# Patient Record
Sex: MALE | Race: WHITE | NOT HISPANIC OR LATINO | Employment: OTHER | URBAN - METROPOLITAN AREA
[De-identification: names, ages, dates, MRNs, and addresses within clinical notes are randomized per-mention and may not be internally consistent; named-entity substitution may affect disease eponyms.]

---

## 2019-02-05 ENCOUNTER — TRANSFERRED RECORDS (OUTPATIENT)
Dept: HEALTH INFORMATION MANAGEMENT | Facility: CLINIC | Age: 84
End: 2019-02-05

## 2019-08-20 ENCOUNTER — HOSPITAL ENCOUNTER (EMERGENCY)
Facility: CLINIC | Age: 84
Discharge: HOME | End: 2019-08-20
Attending: PHYSICIAN ASSISTANT | Admitting: PHYSICIAN ASSISTANT
Payer: COMMERCIAL

## 2019-08-20 VITALS
SYSTOLIC BLOOD PRESSURE: 113 MMHG | HEIGHT: 72 IN | WEIGHT: 185 LBS | BODY MASS INDEX: 25.06 KG/M2 | OXYGEN SATURATION: 96 % | HEART RATE: 98 BPM | DIASTOLIC BLOOD PRESSURE: 76 MMHG | TEMPERATURE: 97.7 F | RESPIRATION RATE: 16 BRPM

## 2019-08-20 DIAGNOSIS — L03.211 FACIAL CELLULITIS: ICD-10-CM

## 2019-08-20 PROCEDURE — 99282 EMERGENCY DEPT VISIT SF MDM: CPT

## 2019-08-20 RX ORDER — CLINDAMYCIN HCL 300 MG
300 CAPSULE ORAL 4 TIMES DAILY
Qty: 40 CAPSULE | Refills: 0 | Status: SHIPPED | OUTPATIENT
Start: 2019-08-20 | End: 2019-10-26

## 2019-08-20 ASSESSMENT — ENCOUNTER SYMPTOMS
WOUND: 1
NAUSEA: 0
VOMITING: 0
EYE PAIN: 0
FEVER: 0

## 2019-08-20 ASSESSMENT — MIFFLIN-ST. JEOR: SCORE: 1572.15

## 2019-08-20 NOTE — ED AVS SNAPSHOT
[facility] Emergency Department  [address]  [facility] [address]  Phone:  [phone]  Fax:  [phone]                                    William Ba   MRN: [id number]    Department:  [facility] Emergency Department   Date of Visit:  8/20/2019           After Visit Summary Signature Page    I have received my discharge instructions, and my questions have been answered. I have discussed any challenges I see with this plan with the nurse or doctor.    ..........................................................................................................................................  Patient/Patient Representative Signature      ..........................................................................................................................................  Patient Representative Print Name and Relationship to Patient    ..................................................               ................................................  Date                                   Time    ..........................................................................................................................................  Reviewed by Signature/Title    ...................................................              ..............................................  Date                                               Time          22EPIC Rev 08/18

## 2019-08-20 NOTE — ED TRIAGE NOTES
Insect bites to face, near eyes last week.  Daughter is concerned that patient has MRSA.  ABCDs intact.

## 2019-08-20 NOTE — ED NOTES
MD in to see patient and discuss diagnosis, test results, and discharge plan. Discussed cellulitis and how to take antibiotics as prescribed. Patient meets discharge criteria. Discussed AVS with patient. Questions answered. Patient verbalized understanding. Patient reports being ready to go home. Patient discharged home by car with family with all necessary information.

## 2019-08-20 NOTE — ED PROVIDER NOTES
History     Chief Complaint:  insect bite to face    HPI   William Ba is a 84 year old male with a history of atrial fibrillation on Xarelto who presents with his daughter for the evaluation of insect bite to face. The patient reports that he was bitten on his forehead by multiple times by a bug one week ago and that he woke up this morning with one of the bites more darkened, red, and swollen, prompting him to the ED. The patient notes that the bite site is painful and that most of his other bites are healing except this one. The patient denies fever, vision changes, eye pain, nausea, vomiting, and other issues.      Allergies:  No Known Drug Allergies     Medications:    Metoprolol  Xarelto     Past Medical History:    COPD  Atrial fibrillation    Past Surgical History:    History reviewed. No pertinent surgical history.    Family History:    History reviewed. No pertinent family history.     Social History:  The patient was accompanied to the ED by daughter and wife.  Smoking Status: Not on file  Smokeless Tobacco: No  Alcohol Use: Yes  Marital Status:   [2]     Review of Systems   Constitutional: Negative for fever.   Eyes: Negative for pain and visual disturbance.   Gastrointestinal: Negative for nausea and vomiting.   Skin: Positive for wound.   All other systems reviewed and are negative.       Physical Exam     Patient Vitals for the past 24 hrs:   BP Temp Temp src Heart Rate Resp SpO2 Height Weight   08/20/19 1010 110/70 97.7  F (36.5  C) Temporal 76 16 97 % 1.829 m (6') 83.9 kg (185 lb)        Physical Exam  Constitutional: well appearing elderly male in no acute distress.   Head: No external signs of trauma noted to head or face.   Eyes: Pupils are equal, round, and reactive to light. Conjunctiva normal. EOMI. Visual acuity groslsly intact.    ENT: MMM.  Normal voice.   Neck: normal ROM.  Cardiovascular: Normal rate, regular rhythm, and intact distal pulses.    Respiratory: Effort normal. No  respiratory distress. Lungs clear to auscultation bilaterally.   GI: Soft. Non-tender.   Musculoskeletal: No deformities appreciated. Normal ROM. No edema noted.  Neurological: Alert and Oriented x 3. Speech normal. Moves all extremities equally.    Psychiatric: Appropriate mood, affect, and behavior.   Skin: Skin is warm and dry. 1 cm scabbed lesion over left eyebrow with mild surrounding swelling and erythema. No induration or fluctuance. No other rash or skin lesions appreciated. No blisters or vesicular lesions noted.       Emergency Department Course   Emergency Department Course:  Past medical records, nursing notes, and vitals reviewed.  1032: I performed an exam of the patient and obtained history, as documented above.     I rechecked the patient.  Findings and plan explained to the Patient. Patient discharged home with instructions regarding supportive care, medications, and reasons to return. The importance of close follow-up was reviewed.        Impression & Plan    Medical Decision Makin year old male presenting for wound check. He was reportedly has bug bites to his face about one week ago. Most of them have completely healed, but there is one above the left eyebrow that is now worse, scabbed over, and now red around it. This is concerning for an early cellulitis. There is no evidence of abscess on exam. He denies significant pain. The area does not seem consistent with zoster as it is not vesicular in nature and there is only one lesion. Additionally, I would suspect that if this was zoster he would be having more pain. He is afebrile and otherwise well appearing. I will plan to treat him with antibiotics for cellulitis and have him follow-up closely in clinic if not improving. Instructed to return to the ED for any new or worsening symptoms, including worsening pain, fever, spreading redness, developing new lesions, or other concerning symptoms.     Diagnosis:    ICD-10-CM    1. Facial  cellulitis L03.211        Disposition:  discharged to home    Discharge Medications:  New Prescriptions    CLINDAMYCIN (CLEOCIN) 300 MG CAPSULE    Take 1 capsule (300 mg) by mouth 4 times daily for 10 days     Scribe Disclosure:  I, Adryan Camilo, am serving as a scribe at 10:32 AM on 8/20/2019 to document services personally performed by Navya Ken PA-C based on my observations and the provider's statements to me.      Adryan Camilo  8/20/2019   [facility] EMERGENCY DEPARTMENT       Navya Ken PA-C  08/20/19 1554

## 2019-09-18 ENCOUNTER — TRANSFERRED RECORDS (OUTPATIENT)
Dept: HEALTH INFORMATION MANAGEMENT | Facility: CLINIC | Age: 84
End: 2019-09-18

## 2019-10-03 ENCOUNTER — TRANSFERRED RECORDS (OUTPATIENT)
Dept: HEALTH INFORMATION MANAGEMENT | Facility: CLINIC | Age: 84
End: 2019-10-03

## 2019-10-26 ENCOUNTER — HOSPITAL ENCOUNTER (EMERGENCY)
Facility: CLINIC | Age: 84
Discharge: HOME | End: 2019-10-26
Attending: EMERGENCY MEDICINE | Admitting: EMERGENCY MEDICINE
Payer: MEDICARE

## 2019-10-26 ENCOUNTER — APPOINTMENT (OUTPATIENT)
Dept: GENERAL RADIOLOGY | Facility: CLINIC | Age: 84
End: 2019-10-26
Attending: EMERGENCY MEDICINE
Payer: MEDICARE

## 2019-10-26 VITALS
SYSTOLIC BLOOD PRESSURE: 114 MMHG | DIASTOLIC BLOOD PRESSURE: 81 MMHG | OXYGEN SATURATION: 99 % | TEMPERATURE: 97.9 F | RESPIRATION RATE: 18 BRPM | HEART RATE: 68 BPM

## 2019-10-26 DIAGNOSIS — R07.9 CHEST PAIN, UNSPECIFIED TYPE: ICD-10-CM

## 2019-10-26 DIAGNOSIS — I48.92 ATRIAL FLUTTER, UNSPECIFIED TYPE (H): ICD-10-CM

## 2019-10-26 DIAGNOSIS — R91.8 PULMONARY NODULES: ICD-10-CM

## 2019-10-26 LAB
ANION GAP SERPL CALCULATED.3IONS-SCNC: 2 MMOL/L (ref 3–14)
BASOPHILS # BLD AUTO: 0 10E9/L (ref 0–0.2)
BASOPHILS NFR BLD AUTO: 0.1 %
BUN SERPL-MCNC: 19 MG/DL (ref 7–30)
CALCIUM SERPL-MCNC: 8.6 MG/DL (ref 8.5–10.1)
CHLORIDE SERPL-SCNC: 109 MMOL/L (ref 94–109)
CO2 SERPL-SCNC: 28 MMOL/L (ref 20–32)
CREAT SERPL-MCNC: 0.98 MG/DL (ref 0.66–1.25)
DIFFERENTIAL METHOD BLD: ABNORMAL
EOSINOPHIL # BLD AUTO: 0.2 10E9/L (ref 0–0.7)
EOSINOPHIL NFR BLD AUTO: 1.8 %
ERYTHROCYTE [DISTWIDTH] IN BLOOD BY AUTOMATED COUNT: 13.2 % (ref 10–15)
GFR SERPL CREATININE-BSD FRML MDRD: 71 ML/MIN/{1.73_M2}
GLUCOSE SERPL-MCNC: 99 MG/DL (ref 70–99)
HCT VFR BLD AUTO: 43.2 % (ref 40–53)
HGB BLD-MCNC: 13.7 G/DL (ref 13.3–17.7)
IMM GRANULOCYTES # BLD: 0 10E9/L (ref 0–0.4)
IMM GRANULOCYTES NFR BLD: 0.5 %
LYMPHOCYTES # BLD AUTO: 1.7 10E9/L (ref 0.8–5.3)
LYMPHOCYTES NFR BLD AUTO: 20.4 %
MCH RBC QN AUTO: 31.5 PG (ref 26.5–33)
MCHC RBC AUTO-ENTMCNC: 31.7 G/DL (ref 31.5–36.5)
MCV RBC AUTO: 99 FL (ref 78–100)
MONOCYTES # BLD AUTO: 0.7 10E9/L (ref 0–1.3)
MONOCYTES NFR BLD AUTO: 8.5 %
NEUTROPHILS # BLD AUTO: 5.6 10E9/L (ref 1.6–8.3)
NEUTROPHILS NFR BLD AUTO: 68.7 %
NRBC # BLD AUTO: 0 10*3/UL
NRBC BLD AUTO-RTO: 0 /100
NT-PROBNP SERPL-MCNC: 1199 PG/ML (ref 0–1800)
PLATELET # BLD AUTO: 145 10E9/L (ref 150–450)
POTASSIUM SERPL-SCNC: 4.1 MMOL/L (ref 3.4–5.3)
RBC # BLD AUTO: 4.35 10E12/L (ref 4.4–5.9)
SODIUM SERPL-SCNC: 139 MMOL/L (ref 133–144)
TROPONIN I SERPL-MCNC: <0.015 UG/L (ref 0–0.04)
TROPONIN I SERPL-MCNC: <0.015 UG/L (ref 0–0.04)
WBC # BLD AUTO: 8.2 10E9/L (ref 4–11)

## 2019-10-26 PROCEDURE — 80048 BASIC METABOLIC PNL TOTAL CA: CPT | Performed by: EMERGENCY MEDICINE

## 2019-10-26 PROCEDURE — 99285 EMERGENCY DEPT VISIT HI MDM: CPT | Mod: 25

## 2019-10-26 PROCEDURE — 93005 ELECTROCARDIOGRAM TRACING: CPT

## 2019-10-26 PROCEDURE — 84484 ASSAY OF TROPONIN QUANT: CPT | Performed by: EMERGENCY MEDICINE

## 2019-10-26 PROCEDURE — 71046 X-RAY EXAM CHEST 2 VIEWS: CPT

## 2019-10-26 PROCEDURE — 83880 ASSAY OF NATRIURETIC PEPTIDE: CPT | Performed by: EMERGENCY MEDICINE

## 2019-10-26 PROCEDURE — 85025 COMPLETE CBC W/AUTO DIFF WBC: CPT | Performed by: EMERGENCY MEDICINE

## 2019-10-26 RX ORDER — LIDOCAINE 40 MG/G
CREAM TOPICAL
Status: DISCONTINUED | OUTPATIENT
Start: 2019-10-26 | End: 2019-10-26 | Stop reason: HOSPADM

## 2019-10-26 ASSESSMENT — ENCOUNTER SYMPTOMS
FEVER: 0
COUGH: 1
SHORTNESS OF BREATH: 0
ABDOMINAL PAIN: 0

## 2019-10-26 NOTE — ED PROVIDER NOTES
Here for CP.  In atrial flutter, hx of, on xarelto.  Delta trop and discharge.    Repeat trop negative. Pt pain free. DC    Yayo Glynn MD  [employer]  6:26 AM 10/26/19        Yayo Glynn MD  10/27/19 0530

## 2019-10-26 NOTE — ED PROVIDER NOTES
History     Chief Complaint:  Chest Pain    HPI   William Ba is a 84 year old male, with a history of COPD and atrial fibrillation, who is currently on Xarelto, who presents to the ED via EMS for evaluation of chest pain. The patient reports a cough for the last 10 days with intermittent episodes of dull chest pain. Tonight, the patient states he started experiencing an episode of chest pain brought on by laying flat around midnight. The patient currently lives in an assisted living home, where he has been getting increased nebulization treatments to help with his cough secondary to his history of COPD. The patient was given 4 x 81 mg Aspirin en route by EMS. The patient denies any fevers, shortness of breath, or abdominal pain.     Allergies:  The patient has no known drug allergies.    Medications:    Metoprolol  Xarelto    Past Medical History:    COPD  Atrial fibrillation/flutter    Past Surgical History:    The patient does not have any pertinent past surgical history.    Family History:    No past pertinent family history.    Social History:  Negative for tobacco use.  Positive for alcohol use.   Negative for drug use.  Marital Status:       Review of Systems   Constitutional: Negative for fever.   Respiratory: Positive for cough. Negative for shortness of breath.    Cardiovascular: Positive for chest pain.   Gastrointestinal: Negative for abdominal pain.   All other systems reviewed and are negative.      Physical Exam     Patient Vitals for the past 24 hrs:   BP Temp Temp src Pulse Heart Rate Resp SpO2   10/26/19 0523 128/82 -- -- 68 67 -- 97 %   10/26/19 0500 132/85 -- -- 69 67 -- 99 %   10/26/19 0450 (!) 141/89 97.9  F (36.6  C) Oral 70 -- 18 100 %     Physical Exam  Nursing note and vitals reviewed.  Constitutional: Cooperative.   HENT:   Mouth/Throat: Mucous membranes are normal.   Cardiovascular: Normal rate, regular rhythm and normal heart sounds.  No murmur.  Pulmonary/Chest: Effort  normal and breath sounds normal. No respiratory distress. No wheezes. No rales.   Abdominal: Soft. Normal appearance and bowel sounds are normal. No distension. There is no tenderness. There is no rigidity and no guarding.   Musculoskeletal: No LE edema   Neurological: Alert. Oriented x3  Skin: Skin is warm and dry.   Psychiatric: Normal mood and affect.     Emergency Department Course   ECG:  Indication: Chest Pain  Time: 0456  Vent. Rate 68 bpm. SD interval *. QRS duration 96. QT/QTc 400/425. P-R-T axis 80 13 6.  Atrial flutter with variable AV block. Read time: 0500    Imaging:  Radiographic findings were communicated with the patient who voiced understanding of the findings.    XR Chest 2 views:   Heart is normal in size. Aorta is tortuous. No pneumothorax. No infiltrates. There is a 4 mm nodule left apex. No pleural fluid. Thoracic osteophytes as per radiology.    Laboratory:  CBC: WBC: 8.2, HGB: 13.7, PLT: 145 (L)  BMP: Anion Gap: 2 (L), o/w WNL (Creatinine: 0.98)    0450 Troponin: <0.015  Nt probnp inpatient: 1,199    0700   Troponin: pending    Emergency Department Course:  Nursing notes and vitals reviewed. (0449) I performed an exam of the patient as documented above.     0456 ECG was obtained.     IV inserted. Medicine administered as documented above. Blood drawn. This was sent to the lab for further testing, results above.     The patient was sent for a chest x-ray while in the emergency department, findings above.     0606 I rechecked the patient and discussed the results of his workup thus far.    The patient was signed out to the AM ED physician.     Impression & Plan    Medical Decision Making:  William Ba is a 84 year old male who presents with chest pain as described in the HPI. The chest pain has abated at this time. He received aspirin pre-hospital. Initial workup is reassuring showing a-flutter on the EKG. Chest x-ray is clear other than small nodule.  Recommended follow up for this with  family. He is already anticoagulated to pulmonary embolism is very unlikely. No evidence of a pneumothorax or pneumonia. Initial troponin was negative, but we'll need a second troponin with a delta of two hours with a second troponin at 0700 for formal rule out. This will be signed out to my partner for follow up. Assuming this is negative the patient will be stable to discharge to home with primary care follow up. If he rules in, then he will need to be admitted for anticoagulation and further cardiology workup.     Diagnosis:    ICD-10-CM   1. Chest pain, unspecified type R07.9   2. Pulmonary nodule - noted on x-ray R91.8       Disposition:  The patient was signed out to the AM ED physician.    Scribe Disclosure:  I, Tara Alvarez, am serving as a scribe on 10/26/2019 at 4:49 AM to personally document services performed by Yayo Waters MD based on my observations and the provider's statements to me.     Tara Alvarez  10/26/2019   [facility] EMERGENCY DEPARTMENT       Yayo Waters MD  10/26/19 0621

## 2019-10-26 NOTE — ED AVS SNAPSHOT
[facility] Emergency Department  [address]  [facility] [address]  Phone:  [phone]  Fax:  [phone]                                    William Ba   MRN: [id number]    Department:  [facility] Emergency Department   Date of Visit:  10/26/2019           After Visit Summary Signature Page    I have received my discharge instructions, and my questions have been answered. I have discussed any challenges I see with this plan with the nurse or doctor.    ..........................................................................................................................................  Patient/Patient Representative Signature      ..........................................................................................................................................  Patient Representative Print Name and Relationship to Patient    ..................................................               ................................................  Date                                   Time    ..........................................................................................................................................  Reviewed by Signature/Title    ...................................................              ..............................................  Date                                               Time          22EPIC Rev 08/18

## 2019-10-26 NOTE — DISCHARGE INSTRUCTIONS
Discharge Instructions  Chest Pain    You have been seen today for chest pain or discomfort.  At this time, your provider has found no signs that your chest pain is due to a serious or life-threatening condition, (or you have declined more testing and/or admission to the hospital). However, sometimes there is a serious problem that does not show up right away. Your evaluation today may not be complete and you may need further testing and evaluation.     Generally, every Emergency Department visit should have a follow-up clinic visit with either a primary or a specialty clinic/provider. Please follow-up as instructed by your emergency provider today.  Return to the Emergency Department if:  Your chest pain changes, gets worse, starts to happen more often, or comes with less activity.  You are newly short of breath.  You get very weak or tired.  You pass out or faint.  You have any new symptoms, like fever, cough, numb legs, or you cough up blood.  You have anything else that worries you.    Until you follow-up with your regular provider, please do the following:  Take one aspirin daily unless you have an allergy or are told not to by your provider.  If a stress test appointment has been made, go to the appointment.  If you have questions, contact your regular provider.  Follow-up with your regular provider/clinic as directed; this is very important.    If you were given a prescription for medicine here today, be sure to read all of the information (including the package insert) that comes with your prescription.  This will include important information about the medicine, its side effects, and any warnings that you need to know about.  The pharmacist who fills the prescription can provide more information and answer questions you may have about the medicine.  If you have questions or concerns that the pharmacist cannot address, please call or return to the Emergency Department.       Remember that you can always come  back to the Emergency Department if you are not able to see your regular provider in the amount of time listed above, if you get any new symptoms, or if there is anything that worries you.  ~~~~~~~~~~~~~~~~~    Discharge Instructions  Incidental Findings    An incidental finding is something unexpected that was found while you were being treated and is felt to not be related to the reason that you came to the Emergency Department.  While this finding is not an emergency, you need to follow up with your primary provider (or occasionally a specialist) to determine if anything should be done about it.    These findings can come from:  Checking your vital signs (example: high blood pressure).  Taking your history (example: unexplained weight loss).  The physical exam (example: a heart murmur).  Laboratory study (example: anemia or low blood count).  X-rays/ultrasound/CT or other imaging (example: an unexplained mass).    Generally, every Emergency Department visit should have a follow-up clinic visit with either a primary or a specialty clinic/provider. Please follow-up as instructed by your emergency provider today.    Return to the Emergency Department if:  Your condition worsens.  You develop unexpected pain.  You now develop new symptoms or have new concerns.  If you were given a prescription for medicine here today, be sure to read all of the information (including the package insert) that comes with your prescription.  This will include important information about the medicine, its side effects, and any warnings that you need to know about.  The pharmacist who fills the prescription can provide more information and answer questions you may have about the medicine.  If you have questions or concerns that the pharmacist cannot address, please call or return to the Emergency Department.   Remember that you can always come back to the Emergency Department if you are not able to see your regular provider in the amount  of time listed above, if you get any new symptoms, or if there is anything that worries you.

## 2019-10-26 NOTE — ED NOTES
Bed: ED09  Expected date: 10/26/19  Expected time: 4:39 AM  Means of arrival: Ambulance  Comments:  907 84 M pneumonia

## 2019-10-28 LAB — INTERPRETATION ECG - MUSE: NORMAL

## 2019-11-08 ENCOUNTER — PRE VISIT (OUTPATIENT)
Dept: CARDIOLOGY | Facility: CLINIC | Age: 84
End: 2019-11-08

## 2019-11-13 PROBLEM — I48.92 ATRIAL FIBRILLATION/FLUTTER (H): Status: ACTIVE | Noted: 2019-11-13

## 2019-11-18 PROBLEM — I63.9 ACUTE CVA (CEREBROVASCULAR ACCIDENT) (H): Status: ACTIVE | Noted: 2019-03-28

## 2019-11-19 ENCOUNTER — OFFICE VISIT (OUTPATIENT)
Dept: CARDIOLOGY | Facility: CLINIC | Age: 84
End: 2019-11-19
Payer: MEDICARE

## 2019-11-19 VITALS
BODY MASS INDEX: 24.52 KG/M2 | SYSTOLIC BLOOD PRESSURE: 85 MMHG | DIASTOLIC BLOOD PRESSURE: 57 MMHG | HEIGHT: 73 IN | HEART RATE: 88 BPM | WEIGHT: 185 LBS

## 2019-11-19 DIAGNOSIS — I25.10 ATHEROSCLEROSIS OF NATIVE CORONARY ARTERY OF NATIVE HEART WITHOUT ANGINA PECTORIS: ICD-10-CM

## 2019-11-19 DIAGNOSIS — I48.91 ATRIAL FIBRILLATION/FLUTTER (H): Primary | ICD-10-CM

## 2019-11-19 DIAGNOSIS — I48.92 ATRIAL FIBRILLATION/FLUTTER (H): Primary | ICD-10-CM

## 2019-11-19 DIAGNOSIS — I71.40 ABDOMINAL AORTIC ANEURYSM WITHOUT RUPTURE (H): ICD-10-CM

## 2019-11-19 PROCEDURE — 99203 OFFICE O/P NEW LOW 30 MIN: CPT | Performed by: INTERNAL MEDICINE

## 2019-11-19 RX ORDER — GLUCOSAM/CHON-MSM1/C/MANG/BOSW 750-644 MG
1 TABLET ORAL 2 TIMES DAILY
COMMUNITY
End: 2023-01-01

## 2019-11-19 RX ORDER — BACITRACIN 500 UNIT/G
1 OINTMENT (GRAM) TOPICAL DAILY
COMMUNITY
Start: 2005-12-16 | End: 2022-04-27

## 2019-11-19 RX ORDER — ATORVASTATIN CALCIUM 40 MG/1
40 TABLET, FILM COATED ORAL AT BEDTIME
COMMUNITY
Start: 2018-10-19 | End: 2020-09-23

## 2019-11-19 RX ORDER — ASPIRIN 81 MG/1
81 TABLET ORAL DAILY
COMMUNITY
End: 2022-01-01

## 2019-11-19 RX ORDER — DOCUSATE SODIUM 100 MG/1
100 CAPSULE, LIQUID FILLED ORAL EVERY EVENING
COMMUNITY
End: 2021-09-03

## 2019-11-19 RX ORDER — FAMOTIDINE 10 MG
10 TABLET ORAL 2 TIMES DAILY
Status: ON HOLD | COMMUNITY
End: 2020-06-11

## 2019-11-19 ASSESSMENT — MIFFLIN-ST. JEOR: SCORE: 1588.03

## 2019-11-19 NOTE — PROGRESS NOTES
HPI and Plan:   This is a nice 84-year-old gentleman, seen along with his daughter who helps care for him, who is referred to establish cardiology care after moving here from [place].  Previously cared for at the [place] [facility] and also [facility] in [place] when he ma there.  His daughter states they are no longer going to winter in [place].    He has a history of paroxysmal atrial fibrillation/flutter, coronary disease found on an angiogram 3 years ago with a chronic mid RCA  and mild to moderate other disease without symptoms, recurrent occasional severe COPD exacerbations requiring hospitalization, and abdominal aortic aneurysm which is been followed for several years and has not changed in a number of years, with a CT scan last year showing it at 2.9 cm.  His daughter notes he always runs a little bit of a low blood pressure which in fact he is doing today.    He was recently seen in the emergency department because of cough that was not getting better.  At that time he was noted to be in atrial fibrillation although the rate appears controlled.  He was not aware of it and is usually not aware of it.  EKGs prior to that in September here, as well as in [place] and [place] earlier this year showed him in sinus rhythm.    Today he states he feels fine.  He is not aware of palpitations, exertional dyspnea or fatigue, denies orthopnea, PND, edema.  Denies any chest discomfort with activity or at other times.  He does get orthostasis but so far it has been mild.  He has not had falls syncope or near syncope.  He is on chronic anticoagulation with rivaroxaban and has had no bleeding issues.    Numerous outside records through care everywhere and records sent here are reviewed to get much of the history as noted above.   He is on a lower dose beta-blocker, very good dose of atorvastatin, rivaroxaban, and his COPD medications.     Exam today is detailed below.  It is notable in summary:  Rapid irregular  rhythm averaging around 120 at times.  Blood pressure of 85-90 systolic  Lungs-clear with fair air movement  Cardiac-no JVD or HJR.  Rapid irregular rhythm without significant murmur  Vascular-all pulses are intact.  No carotid bruits.  Extremities-trace pedal edema    Impression/plan    1-history of paroxysmal atrial fibrillation with previous spontaneous reversion.  I suspect he has been in his atrial fibrillation since his ER visit almost 3 weeks ago.  His rate was okay then but it is clearly rapid today.  He is asymptomatic.  I think we need to determine if we can obtain adequate rate control which may be significantly limited by his low blood pressures and orthostasis.  I will have him do a 48-hour Holter monitor to assess his heart rate average.  If it remains high we could either cautiously try increasing his beta-blocker, which his daughter is concerned about, or I would try amiodarone to get heart rate control without affecting blood pressure much.  I discussed the limitations of amiodarone and and necessary typical monitoring.  An alternative could be AV node ablation with pacemaker but I would like to try amiodarone first.    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  Without ischemic symptoms.  Does not even seem to have them with his relatively rapid rate today.  We will continue medical management.  He is on statin therapy aspirin and anticoagulation and tolerating this.    3-history of mild abdominal aortic aneurysm.  I would agree with a previous evaluation this likely does not need follow-up for a few years now as it has not changed for some time and is relatively small.      I will have him get a 48-hour Holter monitor at this time.  Depending on the results I will have him back in clinic to discuss initiating amiodarone.  However if the rate overall is well controlled we could continue his current regimen.      Orders Placed This Encounter   Procedures     Holter  Monitor 48 hour Adult Pediatric       Orders Placed This Encounter   Medications     aspirin 81 MG EC tablet     Sig: Take 81 mg by mouth     atorvastatin (LIPITOR) 40 MG tablet     Sig: Take 40 mg by mouth     fluticasone-vilanterol (BREO ELLIPTA) 100-25 MCG/INH inhaler     Sig: Inhale 1 puff into the lungs     Multiple Vitamins-Minerals (MULTIVITAMIN ADULT PO)     Sig: Take one(1) tablet daily.     Misc Natural Products (OSTEO BI-FLEX ADV TRIPLE ST) TABS     Sig: Take 1 capsule by mouth     Saw [place]met[place] 160 MG CAPS     famotidine (PEPCID) 10 MG tablet     Sig: Take 10 mg by mouth 2 times daily     docusate sodium (COLACE) 100 MG capsule     Sig: Take 100 mg by mouth 2 times daily       There are no discontinued medications.      Encounter Diagnoses   Name Primary?     Atrial fibrillation/flutter (H) Yes     Atherosclerosis of native coronary artery of native heart without angina pectoris      Abdominal aortic aneurysm without rupture (H)        CURRENT MEDICATIONS:  Current Outpatient Medications   Medication Sig Dispense Refill     aspirin 81 MG EC tablet Take 81 mg by mouth       atorvastatin (LIPITOR) 40 MG tablet Take 40 mg by mouth       docusate sodium (COLACE) 100 MG capsule Take 100 mg by mouth 2 times daily       famotidine (PEPCID) 10 MG tablet Take 10 mg by mouth 2 times daily       fluticasone-vilanterol (BREO ELLIPTA) 100-25 MCG/INH inhaler Inhale 1 puff into the lungs       Metoprolol Succinate 25 MG CS24 Take 25 mg by mouth daily        Misc Natural Products (OSTEO BI-FLEX ADV TRIPLE ST) TABS Take 1 capsule by mouth       Multiple Vitamins-Minerals (MULTIVITAMIN ADULT PO) Take one(1) tablet daily.       rivaroxaban ANTICOAGULANT (XARELTO ANTICOAGULANT) 20 MG TABS tablet Take 20 mg by mouth daily        Saw [place] 160 MG CAPS          ALLERGIES   No Known Allergies    PAST MEDICAL HISTORY:  Past Medical History:   Diagnosis Date     Abdominal aortic aneurysm without rupture (H) 2/25/2016     Acute  CVA (cerebrovascular accident) (H) 3/28/2019     Atrial fibrillation/flutter      Atrial fibrillation/flutter (H) 2019     COPD (chronic obstructive pulmonary disease)      Coronary atherosclerosis 2015     Mixed hyperlipidemia 2010       PAST SURGICAL HISTORY:  History reviewed. No pertinent surgical history.    FAMILY HISTORY:  Family History   Problem Relation Age of Onset     Abdominal Aortic Aneurysm Father        SOCIAL HISTORY:  Social History     Socioeconomic History     Marital status:      Spouse name: None     Number of children: None     Years of education: None     Highest education level: None   Occupational History     None   Social Needs     Financial resource strain: None     Food insecurity:     Worry: None     Inability: None     Transportation needs:     Medical: None     Non-medical: None   Tobacco Use     Smoking status: Former Smoker     Packs/day: 1.00     Last attempt to quit: 2002     Years since quittin.8     Smokeless tobacco: Never Used   Substance and Sexual Activity     Alcohol use: Not Currently     Drug use: None     Sexual activity: None   Lifestyle     Physical activity:     Days per week: None     Minutes per session: None     Stress: None   Relationships     Social connections:     Talks on phone: None     Gets together: None     Attends Christianity service: None     Active member of club or organization: None     Attends meetings of clubs or organizations: None     Relationship status: None     Intimate partner violence:     Fear of current or ex partner: None     Emotionally abused: None     Physically abused: None     Forced sexual activity: None   Other Topics Concern     Parent/sibling w/ CABG, MI or angioplasty before 65F 55M? No   Social History Narrative     None       Review of Systems:  Skin:  Negative       Eyes:  Positive for glasses;cataracts    ENT:  Negative      Respiratory:  Positive for   COPD, well controlled   Cardiovascular:     "Positive for;lightheadedness sometimes has low BP  Gastroenterology: Positive for heartburn    Genitourinary:  Positive for nocturia;prostate problem hx of prostate cancer  Musculoskeletal:  Negative      Neurologic:  Negative      Psychiatric:  Negative      Heme/Lymph/Imm:  Negative      Endocrine:  Negative        Physical Exam:  Vitals: BP (!) 85/57   Pulse 88   Ht 1.854 m (6' 1\")   Wt 83.9 kg (185 lb)   BMI 24.41 kg/m      Constitutional:           Skin:             Head:           Eyes:           Lymph:      ENT:           Neck:           Respiratory:            Cardiac:                                                           GI:           Extremities and Muscular Skeletal:                 Neurological:           Psych:           CC  No referring provider defined for this encounter.              "

## 2019-11-19 NOTE — LETTER
11/19/2019    Adryan James MD  [facility] [address]  [facility] [address]    RE: William OROSCO Jesenia       Dear Colleague,    I had the pleasure of seeing William Ba in the [facility].    HPI and Plan:   This is a nice 84-year-old gentleman, seen along with his daughter who helps care for him, who is referred to establish cardiology care after moving here from [place].  Previously cared for at the [facility] and also [facility] in [place] when he ma there.  His daughter states they are no longer going to winter in [place].    He has a history of paroxysmal atrial fibrillation/flutter, coronary disease found on an angiogram 3 years ago with a chronic mid RCA  and mild to moderate other disease without symptoms, recurrent occasional severe COPD exacerbations requiring hospitalization, and abdominal aortic aneurysm which is been followed for several years and has not changed in a number of years, with a CT scan last year showing it at 2.9 cm.  His daughter notes he always runs a little bit of a low blood pressure which in fact he is doing today.    He was recently seen in the emergency department because of cough that was not getting better.  At that time he was noted to be in atrial fibrillation although the rate appears controlled.  He was not aware of it and is usually not aware of it.  EKGs prior to that in September here, as well as in [place] and [place] earlier this year showed him in sinus rhythm.    Today he states he feels fine.  He is not aware of palpitations, exertional dyspnea or fatigue, denies orthopnea, PND, edema.  Denies any chest discomfort with activity or at other times.  He does get orthostasis but so far it has been mild.  He has not had falls syncope or near syncope.  He is on chronic anticoagulation with rivaroxaban and has had no bleeding issues.    Numerous outside records through care everywhere and records  sent here are reviewed to get much of the history as noted above.   He is on a lower dose beta-blocker, very good dose of atorvastatin, rivaroxaban, and his COPD medications.     Exam today is detailed below.  It is notable in summary:  Rapid irregular rhythm averaging around 120 at times.  Blood pressure of 85-90 systolic  Lungs-clear with fair air movement  Cardiac-no JVD or HJR.  Rapid irregular rhythm without significant murmur  Vascular-all pulses are intact.  No carotid bruits.  Extremities-trace pedal edema    Impression/plan    1-history of paroxysmal atrial fibrillation with previous spontaneous reversion.  I suspect he has been in his atrial fibrillation since his ER visit almost 3 weeks ago.  His rate was okay then but it is clearly rapid today.  He is asymptomatic.  I think we need to determine if we can obtain adequate rate control which may be significantly limited by his low blood pressures and orthostasis.  I will have him do a 48-hour Holter monitor to assess his heart rate average.  If it remains high we could either cautiously try increasing his beta-blocker, which his daughter is concerned about, or I would try amiodarone to get heart rate control without affecting blood pressure much.  I discussed the limitations of amiodarone and and necessary typical monitoring.  An alternative could be AV node ablation with pacemaker but I would like to try amiodarone first.    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  Without ischemic symptoms.  Does not even seem to have them with his relatively rapid rate today.  We will continue medical management.  He is on statin therapy aspirin and anticoagulation and tolerating this.    3-history of mild abdominal aortic aneurysm.  I would agree with a previous evaluation this likely does not need follow-up for a few years now as it has not changed for some time and is relatively small.      I will have him get a 48-hour Holter monitor  at this time.  Depending on the results I will have him back in clinic to discuss initiating amiodarone.  However if the rate overall is well controlled we could continue his current regimen.      Orders Placed This Encounter   Procedures     Holter Monitor 48 hour Adult Pediatric       Orders Placed This Encounter   Medications     aspirin 81 MG EC tablet     Sig: Take 81 mg by mouth     atorvastatin (LIPITOR) 40 MG tablet     Sig: Take 40 mg by mouth     fluticasone-vilanterol (BREO ELLIPTA) 100-25 MCG/INH inhaler     Sig: Inhale 1 puff into the lungs     Multiple Vitamins-Minerals (MULTIVITAMIN ADULT PO)     Sig: Take one(1) tablet daily.     Misc Natural Products (OSTEO BI-FLEX ADV TRIPLE ST) TABS     Sig: Take 1 capsule by mouth     Saw [place]to 160 MG CAPS     famotidine (PEPCID) 10 MG tablet     Sig: Take 10 mg by mouth 2 times daily     docusate sodium (COLACE) 100 MG capsule     Sig: Take 100 mg by mouth 2 times daily       There are no discontinued medications.      Encounter Diagnoses   Name Primary?     Atrial fibrillation/flutter (H) Yes     Atherosclerosis of native coronary artery of native heart without angina pectoris      Abdominal aortic aneurysm without rupture (H)        CURRENT MEDICATIONS:  Current Outpatient Medications   Medication Sig Dispense Refill     aspirin 81 MG EC tablet Take 81 mg by mouth       atorvastatin (LIPITOR) 40 MG tablet Take 40 mg by mouth       docusate sodium (COLACE) 100 MG capsule Take 100 mg by mouth 2 times daily       famotidine (PEPCID) 10 MG tablet Take 10 mg by mouth 2 times daily       fluticasone-vilanterol (BREO ELLIPTA) 100-25 MCG/INH inhaler Inhale 1 puff into the lungs       Metoprolol Succinate 25 MG CS24 Take 25 mg by mouth daily        Misc Natural Products (OSTEO BI-FLEX ADV TRIPLE ST) TABS Take 1 capsule by mouth       Multiple Vitamins-Minerals (MULTIVITAMIN ADULT PO) Take one(1) tablet daily.       rivaroxaban ANTICOAGULANT (XARELTO ANTICOAGULANT)  20 MG TABS tablet Take 20 mg by mouth daily        Saw [place] 160 MG CAPS          ALLERGIES   No Known Allergies    PAST MEDICAL HISTORY:  Past Medical History:   Diagnosis Date     Abdominal aortic aneurysm without rupture (H) 2016     Acute CVA (cerebrovascular accident) (H) 3/28/2019     Atrial fibrillation/flutter      Atrial fibrillation/flutter (H) 2019     COPD (chronic obstructive pulmonary disease)      Coronary atherosclerosis 2015     Mixed hyperlipidemia 2010       PAST SURGICAL HISTORY:  History reviewed. No pertinent surgical history.    FAMILY HISTORY:  Family History   Problem Relation Age of Onset     Abdominal Aortic Aneurysm Father        SOCIAL HISTORY:  Social History     Socioeconomic History     Marital status:      Spouse name: None     Number of children: None     Years of education: None     Highest education level: None   Occupational History     None   Social Needs     Financial resource strain: None     Food insecurity:     Worry: None     Inability: None     Transportation needs:     Medical: None     Non-medical: None   Tobacco Use     Smoking status: Former Smoker     Packs/day: 1.00     Last attempt to quit: 2002     Years since quittin.8     Smokeless tobacco: Never Used   Substance and Sexual Activity     Alcohol use: Not Currently     Drug use: None     Sexual activity: None   Lifestyle     Physical activity:     Days per week: None     Minutes per session: None     Stress: None   Relationships     Social connections:     Talks on phone: None     Gets together: None     Attends Latter day service: None     Active member of club or organization: None     Attends meetings of clubs or organizations: None     Relationship status: None     Intimate partner violence:     Fear of current or ex partner: None     Emotionally abused: None     Physically abused: None     Forced sexual activity: None   Other Topics Concern     Parent/sibling w/ CABG, MI or  "angioplasty before 65F 55M? No   Social History Narrative     None       Review of Systems:  Skin:  Negative       Eyes:  Positive for glasses;cataracts    ENT:  Negative      Respiratory:  Positive for   COPD, well controlled   Cardiovascular:    Positive for;lightheadedness sometimes has low BP  Gastroenterology: Positive for heartburn    Genitourinary:  Positive for nocturia;prostate problem hx of prostate cancer  Musculoskeletal:  Negative      Neurologic:  Negative      Psychiatric:  Negative      Heme/Lymph/Imm:  Negative      Endocrine:  Negative        Physical Exam:  Vitals: BP (!) 85/57   Pulse 88   Ht 1.854 m (6' 1\")   Wt 83.9 kg (185 lb)   BMI 24.41 kg/m       Constitutional:           Skin:             Head:           Eyes:           Lymph:      ENT:           Neck:           Respiratory:            Cardiac:                                                           GI:           Extremities and Muscular Skeletal:                 Neurological:           Psych:           CC  No referring provider defined for this encounter.            Thank you for allowing me to participate in the care of your patient.    Sincerely,     Lito Wood MD     [facility]    "

## 2019-11-21 ENCOUNTER — HOSPITAL ENCOUNTER (OUTPATIENT)
Dept: CARDIOLOGY | Facility: CLINIC | Age: 84
Discharge: HOME | End: 2019-11-21
Attending: INTERNAL MEDICINE | Admitting: INTERNAL MEDICINE
Payer: MEDICARE

## 2019-11-21 DIAGNOSIS — I48.91 ATRIAL FIBRILLATION/FLUTTER (H): ICD-10-CM

## 2019-11-21 DIAGNOSIS — I48.92 ATRIAL FIBRILLATION/FLUTTER (H): ICD-10-CM

## 2019-11-21 PROCEDURE — 93226 XTRNL ECG REC<48 HR SCAN A/R: CPT

## 2019-11-21 PROCEDURE — 93227 XTRNL ECG REC<48 HR R&I: CPT | Performed by: INTERNAL MEDICINE

## 2019-12-18 ENCOUNTER — OFFICE VISIT (OUTPATIENT)
Dept: CARDIOLOGY | Facility: CLINIC | Age: 84
End: 2019-12-18
Payer: MEDICARE

## 2019-12-18 VITALS
BODY MASS INDEX: 24.56 KG/M2 | DIASTOLIC BLOOD PRESSURE: 70 MMHG | HEIGHT: 73 IN | SYSTOLIC BLOOD PRESSURE: 125 MMHG | OXYGEN SATURATION: 100 % | WEIGHT: 185.3 LBS | HEART RATE: 66 BPM

## 2019-12-18 DIAGNOSIS — I25.10 ATHEROSCLEROSIS OF NATIVE CORONARY ARTERY OF NATIVE HEART WITHOUT ANGINA PECTORIS: ICD-10-CM

## 2019-12-18 DIAGNOSIS — I48.91 ATRIAL FIBRILLATION/FLUTTER (H): Primary | ICD-10-CM

## 2019-12-18 DIAGNOSIS — I48.92 ATRIAL FIBRILLATION/FLUTTER (H): Primary | ICD-10-CM

## 2019-12-18 PROCEDURE — 99214 OFFICE O/P EST MOD 30 MIN: CPT | Performed by: NURSE PRACTITIONER

## 2019-12-18 PROCEDURE — 93000 ELECTROCARDIOGRAM COMPLETE: CPT | Performed by: NURSE PRACTITIONER

## 2019-12-18 RX ORDER — METOPROLOL SUCCINATE 50 MG/1
50 TABLET, EXTENDED RELEASE ORAL DAILY
Qty: 90 TABLET | Refills: 3 | Status: SHIPPED | OUTPATIENT
Start: 2019-12-18 | End: 2020-09-23

## 2019-12-18 ASSESSMENT — MIFFLIN-ST. JEOR: SCORE: 1589.4

## 2019-12-18 NOTE — LETTER
12/18/2019      Adryan James MD  [facility] [address]  [facility] [address]      RE: William Ba       Dear Colleague,    I had the pleasure of seeing William Ba in the [facility].    Service Date: 12/18/2019      HISTORY OF PRESENT ILLNESS:  Mr. Ba is a delightful 84-year-old gentleman that I am having the pleasure of meeting today.  He is here today with his daughter and wife.  They are reestablishing cardiology care after moving here from [place], [place].  They moved to [facility] 08/2019.      He has a past medical history that includes:   1.  Paroxysmal atrial fibrillation/flutter, on Xarelto therapy for about 5 years.   2.  Coronary artery disease found on angiogram 3 years ago with a chronic mid-RCA  and mild to moderate other disease elsewhere.  The patient has no symptoms.   3.  Recurrent severe COPD with exacerbations usually required annual hospitalizations.  The patient recently finished prednisone and antibiotics.  No admission was necessary after the course of treatment.   4.  Abdominal aortic aneurysm which is being followed with CT.  Last CT showed 2.9 cm.   5.  Hypercholesterolemia, on atorvastatin 40 mg daily.   6.  History of CVA.   7.  Prostate cancer.      At today's visit, Mr. Ba is feeling well.  He currently denies any chest pain, shortness of breath, lightheadedness or dizziness.  He was seen at [facility] about 10 days ago.  He underwent an EKG, which shows flutter at 129 beats per minute.  At that time, metoprolol ER was increased from 25 to 50 mg once daily.      He also underwent a chest x-ray which did show stable interstitial coarsening thought to be secondary to chronic changes.  Possible pneumonitis was noted without concerns of pleural effusion.      A 12-lead EKG was completed today after the metoprolol was increased which shows atrial flutter at 66 beats per minute.      A 48-hour  Holter monitor was completed which showed principal rhythm of atrial flutter.  Unfortunately, the tracing was good, however channel was lost senior living through.  He had no significant pauses.  In brief, ventricular ectopic beats.  Average heart rate was 99 beats per minute, max 137 beats per minute and a minimum of 60 beats per minute.  Dr. Wood did review the monitor and recommended increased metoprolol and/or amiodarone.      Currently, he is feeling well.  He is completely asymptomatic of his atrial flutter and rate appears controlled with the increased metoprolol.  All other review of systems and past medical history are noted below.      ASSESSMENT AND PLAN:  Mr. Ba is a delightful 84-year-old gentleman that I am getting the pleasure of meeting today.   1.  Atrial flutter/atrial fibrillation.  I reviewed some of his documents from [facility].  He has had this diagnosis for the last 4-5 years.  His daughter, a retired OB nurse, states that he has been on Xarelto and they have been doing rate control.  He is completely asymptomatic.  His heart rate was slightly high on his 48-hour Holter monitor.  EKG today shows an atrial flutter with controlled ventricular rate.  I briefly considered discussing atrial flutter ablation; however, with the history of atrial fibrillation and the patient being asymptomatic, I think taking a conservative approach in this delightful 84-year-old gentleman is the right thing to do.  I did order a Zio Patch in 3 months just to see if there are any other arrhythmias or if this is paroxysmal in nature and I will see them to review those results.   2.  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  He has 100% RCA obstruction, 50% proximal LAD stenosis, 40% circumflex stenosis.  He is completely asymptomatic.  He is on baby aspirin, metoprolol, atorvastatin 40 mg daily.   3.  Mild abdominal aortic aneurysm.  It is small in nature.  Dr. Wood reviewed his  reports.  He felt he did not need a followup scan for the next several years.   4.  COPD.  Managed by Pulmonology.  The patient is also being followed closely by [facility].      As always, I appreciate being involved in the care of this delightful gentleman.  Last lipid profile was completed in 2019 noted in Epic.  Total cholesterol was 103, triglycerides 47, HDL 47, LDL 47, non-HDL 56.  This was done in [place].  If there are questions or concerns, I have encouraged them to please contact me.  I will see him back in 3-4 months.         ZORA MCMULLEN NP             D: 2019   T: 2019   MT: al      Name:     AURELIANO KAUFMAN   MRN:      [id number]        Account:      [id number]   :      1935           Service Date: 2019      Document: [id number]         Outpatient Encounter Medications as of 2019   Medication Sig Dispense Refill     aspirin 81 MG EC tablet Take 81 mg by mouth       atorvastatin (LIPITOR) 40 MG tablet Take 40 mg by mouth       docusate sodium (COLACE) 100 MG capsule Take 100 mg by mouth daily        famotidine (PEPCID) 10 MG tablet Take 10 mg by mouth 2 times daily       fluticasone-vilanterol (BREO ELLIPTA) 100-25 MCG/INH inhaler Inhale 1 puff into the lungs       metoprolol succinate ER (TOPROL-XL) 50 MG 24 hr tablet Take 1 tablet (50 mg) by mouth daily 90 tablet 3     Misc Natural Products (OSTEO BI-FLEX ADV TRIPLE ST) TABS Take 1 capsule by mouth       Multiple Vitamins-Minerals (MULTIVITAMIN ADULT PO) Take one(1) tablet daily.       rivaroxaban ANTICOAGULANT (XARELTO ANTICOAGULANT) 20 MG TABS tablet Take 20 mg by mouth daily        Saw [place] 160 MG CAPS        [DISCONTINUED] Metoprolol Succinate 25 MG CS24 Take 50 mg by mouth daily       No facility-administered encounter medications on file as of 2019.        Again, thank you for allowing me to participate in the care of your patient.      Sincerely,    Zora Mcmullen NP, APRN CNP      [facility]

## 2019-12-18 NOTE — PROGRESS NOTES
HPI and Plan: #[id number]    See dictation    Orders Placed This Encounter   Procedures     Follow-Up with Cardiac Advanced Practice Provider     EKG 12-lead complete w/read - Clinics (performed today)     Zio Patch Holter Adult Pediatric Greater than 48 hrs       Orders Placed This Encounter   Medications     metoprolol succinate ER (TOPROL-XL) 50 MG 24 hr tablet     Sig: Take 1 tablet (50 mg) by mouth daily     Dispense:  90 tablet     Refill:  3       Medications Discontinued During This Encounter   Medication Reason     Metoprolol Succinate 25 MG CS24 Dose adjustment         Encounter Diagnosis   Name Primary?     Atrial fibrillation/flutter (H) Yes       CURRENT MEDICATIONS:  Current Outpatient Medications   Medication Sig Dispense Refill     aspirin 81 MG EC tablet Take 81 mg by mouth       atorvastatin (LIPITOR) 40 MG tablet Take 40 mg by mouth       docusate sodium (COLACE) 100 MG capsule Take 100 mg by mouth daily        famotidine (PEPCID) 10 MG tablet Take 10 mg by mouth 2 times daily       fluticasone-vilanterol (BREO ELLIPTA) 100-25 MCG/INH inhaler Inhale 1 puff into the lungs       metoprolol succinate ER (TOPROL-XL) 50 MG 24 hr tablet Take 1 tablet (50 mg) by mouth daily 90 tablet 3     Misc Natural Products (OSTEO BI-FLEX ADV TRIPLE ST) TABS Take 1 capsule by mouth       Multiple Vitamins-Minerals (MULTIVITAMIN ADULT PO) Take one(1) tablet daily.       rivaroxaban ANTICOAGULANT (XARELTO ANTICOAGULANT) 20 MG TABS tablet Take 20 mg by mouth daily        Saw [place] 160 MG CAPS          ALLERGIES   No Known Allergies    PAST MEDICAL HISTORY:  Past Medical History:   Diagnosis Date     Abdominal aortic aneurysm without rupture (H) 2/25/2016     Acute CVA (cerebrovascular accident) (H) 3/28/2019     Atrial fibrillation/flutter      Atrial fibrillation/flutter (H) 11/13/2019     COPD (chronic obstructive pulmonary disease)      Coronary atherosclerosis 1/29/2015     Mixed hyperlipidemia 11/8/2010       PAST  SURGICAL HISTORY:  No past surgical history on file.    FAMILY HISTORY:  Family History   Problem Relation Age of Onset     Abdominal Aortic Aneurysm Father        SOCIAL HISTORY:  Social History     Socioeconomic History     Marital status:      Spouse name: None     Number of children: None     Years of education: None     Highest education level: None   Occupational History     None   Social Needs     Financial resource strain: None     Food insecurity:     Worry: None     Inability: None     Transportation needs:     Medical: None     Non-medical: None   Tobacco Use     Smoking status: Former Smoker     Packs/day: 1.00     Last attempt to quit:      Years since quittin.9     Smokeless tobacco: Never Used   Substance and Sexual Activity     Alcohol use: Not Currently     Drug use: None     Sexual activity: None   Lifestyle     Physical activity:     Days per week: None     Minutes per session: None     Stress: None   Relationships     Social connections:     Talks on phone: None     Gets together: None     Attends Bahai service: None     Active member of club or organization: None     Attends meetings of clubs or organizations: None     Relationship status: None     Intimate partner violence:     Fear of current or ex partner: None     Emotionally abused: None     Physically abused: None     Forced sexual activity: None   Other Topics Concern     Parent/sibling w/ CABG, MI or angioplasty before 65F 55M? No   Social History Narrative     None       Review of Systems:  Skin:  Negative       Eyes:  Positive for glasses;cataracts    ENT:  Negative      Respiratory:  Positive for   COPD, well controlled   Cardiovascular:    Positive for;lightheadedness;chest pain chest cold and sinus tack 19 went to [facility]   Gastroenterology: Positive for heartburn    Genitourinary:  Positive for nocturia;prostate problem hx of prostate cancer  Musculoskeletal:  Negative      Neurologic:   "Negative      Psychiatric:  Negative      Heme/Lymph/Imm:  Negative      Endocrine:  Negative        Physical Exam:  Vitals: /70   Pulse 66   Ht 1.854 m (6' 1\")   Wt 84.1 kg (185 lb 4.8 oz)   SpO2 100%   BMI 24.45 kg/m      Constitutional:  cooperative, alert and oriented, well developed, well nourished, in no acute distress        Skin:  warm and dry to the touch;no apparent skin lesions or masses noted          Head:  normocephalic, no masses or lesions        Eyes:  pupils equal and round;conjunctivae and lids unremarkable        Lymph:      ENT:           Neck:  JVP normal;no carotid bruit        Respiratory:  clear to auscultation diminished breath sounds bilaterally       Cardiac: regular rhythm;normal S1 and S2;no murmurs, gallops or rubs detected                not assessed this visit                                        GI:           Extremities and Muscular Skeletal:  no edema;no deformities, clubbing, cyanosis, erythema observed              Neurological:  no gross motor deficits        Psych:  Alert and Oriented x 3        CC  No referring provider defined for this encounter.              "

## 2019-12-18 NOTE — LETTER
12/18/2019    Adryan James MD  [facility] [address]  [facility] [address]    RE: William Ba       Dear Colleague,    I had the pleasure of seeing William Ba in the [facility].    HPI and Plan: #[id number]    See dictation    Orders Placed This Encounter   Procedures     Follow-Up with Cardiac Advanced Practice Provider     EKG 12-lead complete w/read - Clinics (performed today)     Zio Patch Holter Adult Pediatric Greater than 48 hrs       Orders Placed This Encounter   Medications     metoprolol succinate ER (TOPROL-XL) 50 MG 24 hr tablet     Sig: Take 1 tablet (50 mg) by mouth daily     Dispense:  90 tablet     Refill:  3       Medications Discontinued During This Encounter   Medication Reason     Metoprolol Succinate 25 MG CS24 Dose adjustment         Encounter Diagnosis   Name Primary?     Atrial fibrillation/flutter (H) Yes       CURRENT MEDICATIONS:  Current Outpatient Medications   Medication Sig Dispense Refill     aspirin 81 MG EC tablet Take 81 mg by mouth       atorvastatin (LIPITOR) 40 MG tablet Take 40 mg by mouth       docusate sodium (COLACE) 100 MG capsule Take 100 mg by mouth daily        famotidine (PEPCID) 10 MG tablet Take 10 mg by mouth 2 times daily       fluticasone-vilanterol (BREO ELLIPTA) 100-25 MCG/INH inhaler Inhale 1 puff into the lungs       metoprolol succinate ER (TOPROL-XL) 50 MG 24 hr tablet Take 1 tablet (50 mg) by mouth daily 90 tablet 3     Misc Natural Products (OSTEO BI-FLEX ADV TRIPLE ST) TABS Take 1 capsule by mouth       Multiple Vitamins-Minerals (MULTIVITAMIN ADULT PO) Take one(1) tablet daily.       rivaroxaban ANTICOAGULANT (XARELTO ANTICOAGULANT) 20 MG TABS tablet Take 20 mg by mouth daily        Saw [place] 160 MG CAPS          ALLERGIES   No Known Allergies    PAST MEDICAL HISTORY:  Past Medical History:   Diagnosis Date     Abdominal aortic aneurysm without rupture (H) 2/25/2016     Acute CVA  (cerebrovascular accident) (H) 3/28/2019     Atrial fibrillation/flutter      Atrial fibrillation/flutter (H) 2019     COPD (chronic obstructive pulmonary disease)      Coronary atherosclerosis 2015     Mixed hyperlipidemia 2010       PAST SURGICAL HISTORY:  No past surgical history on file.    FAMILY HISTORY:  Family History   Problem Relation Age of Onset     Abdominal Aortic Aneurysm Father        SOCIAL HISTORY:  Social History     Socioeconomic History     Marital status:      Spouse name: None     Number of children: None     Years of education: None     Highest education level: None   Occupational History     None   Social Needs     Financial resource strain: None     Food insecurity:     Worry: None     Inability: None     Transportation needs:     Medical: None     Non-medical: None   Tobacco Use     Smoking status: Former Smoker     Packs/day: 1.00     Last attempt to quit: 2002     Years since quittin.9     Smokeless tobacco: Never Used   Substance and Sexual Activity     Alcohol use: Not Currently     Drug use: None     Sexual activity: None   Lifestyle     Physical activity:     Days per week: None     Minutes per session: None     Stress: None   Relationships     Social connections:     Talks on phone: None     Gets together: None     Attends Buddhism service: None     Active member of club or organization: None     Attends meetings of clubs or organizations: None     Relationship status: None     Intimate partner violence:     Fear of current or ex partner: None     Emotionally abused: None     Physically abused: None     Forced sexual activity: None   Other Topics Concern     Parent/sibling w/ CABG, MI or angioplasty before 65F 55M? No   Social History Narrative     None       Review of Systems:  Skin:  Negative       Eyes:  Positive for glasses;cataracts    ENT:  Negative      Respiratory:  Positive for   COPD, well controlled   Cardiovascular:    Positive  "for;lightheadedness;chest pain chest cold and sinus tack 12/6/19 went to [facility]   Gastroenterology: Positive for heartburn    Genitourinary:  Positive for nocturia;prostate problem hx of prostate cancer  Musculoskeletal:  Negative      Neurologic:  Negative      Psychiatric:  Negative      Heme/Lymph/Imm:  Negative      Endocrine:  Negative        Physical Exam:  Vitals: /70   Pulse 66   Ht 1.854 m (6' 1\")   Wt 84.1 kg (185 lb 4.8 oz)   SpO2 100%   BMI 24.45 kg/m       Constitutional:  cooperative, alert and oriented, well developed, well nourished, in no acute distress        Skin:  warm and dry to the touch;no apparent skin lesions or masses noted          Head:  normocephalic, no masses or lesions        Eyes:  pupils equal and round;conjunctivae and lids unremarkable        Lymph:      ENT:           Neck:  JVP normal;no carotid bruit        Respiratory:  clear to auscultation diminished breath sounds bilaterally       Cardiac: regular rhythm;normal S1 and S2;no murmurs, gallops or rubs detected                not assessed this visit                                        GI:           Extremities and Muscular Skeletal:  no edema;no deformities, clubbing, cyanosis, erythema observed              Neurological:  no gross motor deficits        Psych:  Alert and Oriented x 3        CC  No referring provider defined for this encounter.                Thank you for allowing me to participate in the care of your patient.      Sincerely,     Zora Moreno NP, APRN [facility]    cc:   No referring provider defined for this encounter.        "

## 2019-12-18 NOTE — PATIENT INSTRUCTIONS
Call my nurse with any questions or concerns:  [phone]  *If you have concerns after hours, please call [phone], option 2 to speak with on call Cardiologist.    1. Medication changes from today:    None

## 2019-12-19 NOTE — PROGRESS NOTES
Service Date: 12/18/2019      HISTORY OF PRESENT ILLNESS:  Mr. Ba is a delightful 84-year-old gentleman that I am having the pleasure of meeting today.  He is here today with his daughter and wife.  They are reestablishing cardiology care after moving here from [place], [place].  They moved to the [facility] 08/2019.      He has a past medical history that includes:   1.  Paroxysmal atrial fibrillation/flutter, on Xarelto therapy for about 5 years.   2.  Coronary artery disease found on angiogram 3 years ago with a chronic mid-RCA  and mild to moderate other disease elsewhere.  The patient has no symptoms.   3.  Recurrent severe COPD with exacerbations usually required annual hospitalizations.  The patient recently finished prednisone and antibiotics.  No admission was necessary after the course of treatment.   4.  Abdominal aortic aneurysm which is being followed with CT.  Last CT showed 2.9 cm.   5.  Hypercholesterolemia, on atorvastatin 40 mg daily.   6.  History of CVA.   7.  Prostate cancer.      At today's visit, Mr. Ba is feeling well.  He currently denies any chest pain, shortness of breath, lightheadedness or dizziness.  He was seen at [facility] about 10 days ago.  He underwent an EKG, which shows flutter at 129 beats per minute.  At that time, metoprolol ER was increased from 25 to 50 mg once daily.      He also underwent a chest x-ray which did show stable interstitial coarsening thought to be secondary to chronic changes.  Possible pneumonitis was noted without concerns of pleural effusion.      A 12-lead EKG was completed today after the metoprolol was increased which shows atrial flutter at 66 beats per minute.      A 48-hour Holter monitor was completed which showed principal rhythm of atrial flutter.  Unfortunately, the tracing was good, however channel was lost nursing home through.  He had no significant pauses.  In brief, ventricular ectopic beats.  Average heart rate was 99  beats per minute, max 137 beats per minute and a minimum of 60 beats per minute.  Dr. Wood did review the monitor and recommended increased metoprolol and/or amiodarone.      Currently, he is feeling well.  He is completely asymptomatic of his atrial flutter and rate appears controlled with the increased metoprolol.  All other review of systems and past medical history are noted below.      ASSESSMENT AND PLAN:  Mr. Ba is a delightful 84-year-old gentleman that I am getting the pleasure of meeting today.   1.  Atrial flutter/atrial fibrillation.  I reviewed some of his documents from [facility].  He has had this diagnosis for the last 4-5 years.  His daughter, a retired OB nurse, states that he has been on Xarelto and they have been doing rate control.  He is completely asymptomatic.  His heart rate was slightly high on his 48-hour Holter monitor.  EKG today shows an atrial flutter with controlled ventricular rate.  I briefly considered discussing atrial flutter ablation; however, with the history of atrial fibrillation and the patient being asymptomatic, I think taking a conservative approach in this delightful 84-year-old gentleman is the right thing to do.  I did order a Zio Patch in 3 months just to see if there are any other arrhythmias or if this is paroxysmal in nature and I will see them to review those results.   2.  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  He has 100% RCA obstruction, 50% proximal LAD stenosis, 40% circumflex stenosis.  He is completely asymptomatic.  He is on baby aspirin, metoprolol, atorvastatin 40 mg daily.   3.  Mild abdominal aortic aneurysm.  It is small in nature.  Dr. Wood reviewed his reports.  He felt he did not need a followup scan for the next several years.   4.  COPD.  Managed by Pulmonology.  The patient is also being followed closely by [facility].      As always, I appreciate being involved in the care of this  delightful gentleman.  Last lipid profile was completed in 2019 noted in Epic.  Total cholesterol was 103, triglycerides 47, HDL 47, LDL 47, non-HDL 56.  This was done in [place].  If there are questions or concerns, I have encouraged them to please contact me.  I will see him back in 3-4 months.         BINU MCMULLEN NP             D: 2019   T: 2019   MT: al      Name:     AURELIANO KAUFMAN   MRN:      [id number]        Account:      [id number]   :      1935           Service Date: 2019      Document: [id number]

## 2020-06-10 ENCOUNTER — HOSPITAL ENCOUNTER (INPATIENT)
Facility: CLINIC | Age: 85
LOS: 1 days | Discharge: HOME HEALTH | DRG: 200 | End: 2020-06-12
Attending: EMERGENCY MEDICINE | Admitting: SURGERY
Payer: MEDICARE

## 2020-06-10 ENCOUNTER — APPOINTMENT (OUTPATIENT)
Dept: CT IMAGING | Facility: CLINIC | Age: 85
DRG: 200 | End: 2020-06-10
Attending: EMERGENCY MEDICINE
Payer: MEDICARE

## 2020-06-10 DIAGNOSIS — S22.42XD CLOSED FRACTURE OF MULTIPLE RIBS OF LEFT SIDE WITH ROUTINE HEALING: Primary | ICD-10-CM

## 2020-06-10 DIAGNOSIS — J94.2 HEMOPNEUMOTHORAX ON LEFT: ICD-10-CM
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
## 2020-06-10 DIAGNOSIS — Z79.01 CHRONIC ANTICOAGULATION: ICD-10-CM

## 2020-06-10 DIAGNOSIS — S22.32XA CLOSED FRACTURE OF ONE RIB OF LEFT SIDE, INITIAL ENCOUNTER: ICD-10-CM

## 2020-06-10 LAB
ANION GAP SERPL CALCULATED.3IONS-SCNC: 3 MMOL/L (ref 3–14)
BASOPHILS # BLD AUTO: 0 10E9/L (ref 0–0.2)
BASOPHILS NFR BLD AUTO: 0.1 %
BUN SERPL-MCNC: 25 MG/DL (ref 7–30)
CALCIUM SERPL-MCNC: 8.4 MG/DL (ref 8.5–10.1)
CHLORIDE SERPL-SCNC: 111 MMOL/L (ref 94–109)
CO2 SERPL-SCNC: 27 MMOL/L (ref 20–32)
CREAT BLD-MCNC: 0.8 MG/DL (ref 0.66–1.25)
CREAT SERPL-MCNC: 0.83 MG/DL (ref 0.66–1.25)
DIFFERENTIAL METHOD BLD: ABNORMAL
EOSINOPHIL # BLD AUTO: 0.1 10E9/L (ref 0–0.7)
EOSINOPHIL NFR BLD AUTO: 1.3 %
ERYTHROCYTE [DISTWIDTH] IN BLOOD BY AUTOMATED COUNT: 14.7 % (ref 10–15)
GFR SERPL CREATININE-BSD FRML MDRD: 81 ML/MIN/{1.73_M2}
GFR SERPL CREATININE-BSD FRML MDRD: >90 ML/MIN/{1.73_M2}
GLUCOSE SERPL-MCNC: 87 MG/DL (ref 70–99)
HCT VFR BLD AUTO: 40.1 % (ref 40–53)
HGB BLD-MCNC: 12.7 G/DL (ref 13.3–17.7)
IMM GRANULOCYTES # BLD: 0.1 10E9/L (ref 0–0.4)
IMM GRANULOCYTES NFR BLD: 0.7 %
INR PPP: 2.68 (ref 0.86–1.14)
LYMPHOCYTES # BLD AUTO: 2.1 10E9/L (ref 0.8–5.3)
LYMPHOCYTES NFR BLD AUTO: 24.2 %
MCH RBC QN AUTO: 31.3 PG (ref 26.5–33)
MCHC RBC AUTO-ENTMCNC: 31.7 G/DL (ref 31.5–36.5)
MCV RBC AUTO: 99 FL (ref 78–100)
MONOCYTES # BLD AUTO: 0.7 10E9/L (ref 0–1.3)
MONOCYTES NFR BLD AUTO: 8.1 %
NEUTROPHILS # BLD AUTO: 5.7 10E9/L (ref 1.6–8.3)
NEUTROPHILS NFR BLD AUTO: 65.6 %
NRBC # BLD AUTO: 0 10*3/UL
NRBC BLD AUTO-RTO: 0 /100
PLATELET # BLD AUTO: 138 10E9/L (ref 150–450)
POTASSIUM SERPL-SCNC: 4 MMOL/L (ref 3.4–5.3)
RBC # BLD AUTO: 4.06 10E12/L (ref 4.4–5.9)
SODIUM SERPL-SCNC: 141 MMOL/L (ref 133–144)
WBC # BLD AUTO: 8.7 10E9/L (ref 4–11)

## 2020-06-10 PROCEDURE — 25000125 ZZHC RX 250: Performed by: EMERGENCY MEDICINE

## 2020-06-10 PROCEDURE — 93005 ELECTROCARDIOGRAM TRACING: CPT

## 2020-06-10 PROCEDURE — 85610 PROTHROMBIN TIME: CPT | Performed by: EMERGENCY MEDICINE

## 2020-06-10 PROCEDURE — 96374 THER/PROPH/DIAG INJ IV PUSH: CPT | Mod: 59

## 2020-06-10 PROCEDURE — 99285 EMERGENCY DEPT VISIT HI MDM: CPT | Mod: 25

## 2020-06-10 PROCEDURE — 25000132 ZZH RX MED GY IP 250 OP 250 PS 637: Mod: GY | Performed by: EMERGENCY MEDICINE

## 2020-06-10 PROCEDURE — 74177 CT ABD & PELVIS W/CONTRAST: CPT

## 2020-06-10 PROCEDURE — 80048 BASIC METABOLIC PNL TOTAL CA: CPT | Performed by: EMERGENCY MEDICINE

## 2020-06-10 PROCEDURE — 85025 COMPLETE CBC W/AUTO DIFF WBC: CPT | Performed by: EMERGENCY MEDICINE

## 2020-06-10 PROCEDURE — 25000128 H RX IP 250 OP 636: Performed by: EMERGENCY MEDICINE

## 2020-06-10 PROCEDURE — 70450 CT HEAD/BRAIN W/O DYE: CPT

## 2020-06-10 PROCEDURE — C9803 HOPD COVID-19 SPEC COLLECT: HCPCS

## 2020-06-10 PROCEDURE — 82565 ASSAY OF CREATININE: CPT

## 2020-06-10 RX ORDER — LIDOCAINE 40 MG/G
CREAM TOPICAL
Status: DISCONTINUED | OUTPATIENT
Start: 2020-06-10 | End: 2020-06-11

## 2020-06-10 RX ORDER — OXYCODONE AND ACETAMINOPHEN 5; 325 MG/1; MG/1
1 TABLET ORAL ONCE
Status: COMPLETED | OUTPATIENT
Start: 2020-06-10 | End: 2020-06-10

## 2020-06-10 RX ORDER — IOPAMIDOL 755 MG/ML
500 INJECTION, SOLUTION INTRAVASCULAR ONCE
Status: COMPLETED | OUTPATIENT
Start: 2020-06-10 | End: 2020-06-10

## 2020-06-10 RX ADMIN — OXYCODONE HYDROCHLORIDE AND ACETAMINOPHEN 1 TABLET: 5; 325 TABLET ORAL at 23:53

## 2020-06-10 RX ADMIN — SODIUM CHLORIDE 63 ML: 9 INJECTION, SOLUTION INTRAVENOUS at 23:14

## 2020-06-10 RX ADMIN — IOPAMIDOL 93 ML: 755 INJECTION, SOLUTION INTRAVENOUS at 23:14

## 2020-06-11 ENCOUNTER — APPOINTMENT (OUTPATIENT)
Dept: GENERAL RADIOLOGY | Facility: CLINIC | Age: 85
DRG: 200 | End: 2020-06-11
Attending: PHYSICIAN ASSISTANT
Payer: MEDICARE

## 2020-06-11 ENCOUNTER — APPOINTMENT (OUTPATIENT)
Dept: PHYSICAL THERAPY | Facility: CLINIC | Age: 85
DRG: 200 | End: 2020-06-11
Attending: SURGERY
Payer: MEDICARE

## 2020-06-11 ENCOUNTER — APPOINTMENT (OUTPATIENT)
Dept: CT IMAGING | Facility: CLINIC | Age: 85
DRG: 200 | End: 2020-06-11
Attending: EMERGENCY MEDICINE
Payer: MEDICARE

## 2020-06-11 PROBLEM — J93.9 PNEUMOTHORAX, LEFT: Status: ACTIVE | Noted: 2020-06-11

## 2020-06-11 LAB
ANION GAP SERPL CALCULATED.3IONS-SCNC: 9 MMOL/L (ref 3–14)
BUN SERPL-MCNC: 19 MG/DL (ref 7–30)
CALCIUM SERPL-MCNC: 8 MG/DL (ref 8.5–10.1)
CHLORIDE SERPL-SCNC: 108 MMOL/L (ref 94–109)
CO2 SERPL-SCNC: 23 MMOL/L (ref 20–32)
CREAT SERPL-MCNC: 0.67 MG/DL (ref 0.66–1.25)
ERYTHROCYTE [DISTWIDTH] IN BLOOD BY AUTOMATED COUNT: 14.6 % (ref 10–15)
GFR SERPL CREATININE-BSD FRML MDRD: 88 ML/MIN/{1.73_M2}
GLUCOSE SERPL-MCNC: 90 MG/DL (ref 70–99)
HCT VFR BLD AUTO: 38.9 % (ref 40–53)
HGB BLD-MCNC: 12.6 G/DL (ref 13.3–17.7)
INTERPRETATION ECG - MUSE: NORMAL
MCH RBC QN AUTO: 31.6 PG (ref 26.5–33)
MCHC RBC AUTO-ENTMCNC: 32.4 G/DL (ref 31.5–36.5)
MCV RBC AUTO: 98 FL (ref 78–100)
PLATELET # BLD AUTO: 130 10E9/L (ref 150–450)
POTASSIUM SERPL-SCNC: 3.9 MMOL/L (ref 3.4–5.3)
RBC # BLD AUTO: 3.99 10E12/L (ref 4.4–5.9)
SARS-COV-2 RNA SPEC QL NAA+PROBE: NOT DETECTED
SODIUM SERPL-SCNC: 140 MMOL/L (ref 133–144)
SPECIMEN SOURCE: NORMAL
WBC # BLD AUTO: 8.2 10E9/L (ref 4–11)

## 2020-06-11 PROCEDURE — 99231 SBSQ HOSP IP/OBS SF/LOW 25: CPT | Performed by: SURGERY

## 2020-06-11 PROCEDURE — 71250 CT THORAX DX C-: CPT

## 2020-06-11 PROCEDURE — 99222 1ST HOSP IP/OBS MODERATE 55: CPT | Performed by: PHYSICIAN ASSISTANT

## 2020-06-11 PROCEDURE — 25000132 ZZH RX MED GY IP 250 OP 250 PS 637: Mod: GY | Performed by: SURGERY

## 2020-06-11 PROCEDURE — U0003 INFECTIOUS AGENT DETECTION BY NUCLEIC ACID (DNA OR RNA); SEVERE ACUTE RESPIRATORY SYNDROME CORONAVIRUS 2 (SARS-COV-2) (CORONAVIRUS DISEASE [COVID-19]), AMPLIFIED PROBE TECHNIQUE, MAKING USE OF HIGH THROUGHPUT TECHNOLOGIES AS DESCRIBED BY CMS-2020-01-R: HCPCS | Performed by: EMERGENCY MEDICINE

## 2020-06-11 PROCEDURE — 25000132 ZZH RX MED GY IP 250 OP 250 PS 637: Mod: GY | Performed by: PHYSICIAN ASSISTANT

## 2020-06-11 PROCEDURE — 25000128 H RX IP 250 OP 636: Performed by: EMERGENCY MEDICINE

## 2020-06-11 PROCEDURE — 25000132 ZZH RX MED GY IP 250 OP 250 PS 637: Mod: GY | Performed by: INTERNAL MEDICINE

## 2020-06-11 PROCEDURE — 99222 1ST HOSP IP/OBS MODERATE 55: CPT | Performed by: INTERNAL MEDICINE

## 2020-06-11 PROCEDURE — 80048 BASIC METABOLIC PNL TOTAL CA: CPT | Performed by: PHYSICIAN ASSISTANT

## 2020-06-11 PROCEDURE — 12000000 ZZH R&B MED SURG/OB

## 2020-06-11 PROCEDURE — 94640 AIRWAY INHALATION TREATMENT: CPT

## 2020-06-11 PROCEDURE — 25000128 H RX IP 250 OP 636: Performed by: SURGERY

## 2020-06-11 PROCEDURE — 99207 ZZC APP CREDIT; MD BILLING SHARED VISIT: CPT | Performed by: PHYSICIAN ASSISTANT

## 2020-06-11 PROCEDURE — 25000125 ZZHC RX 250: Performed by: INTERNAL MEDICINE

## 2020-06-11 PROCEDURE — 99207 ZZC CDG-CHARGE REQUIRED MANUAL ENTRY: CPT | Performed by: INTERNAL MEDICINE

## 2020-06-11 PROCEDURE — 85027 COMPLETE CBC AUTOMATED: CPT | Performed by: PHYSICIAN ASSISTANT

## 2020-06-11 PROCEDURE — 71046 X-RAY EXAM CHEST 2 VIEWS: CPT

## 2020-06-11 PROCEDURE — 97161 PT EVAL LOW COMPLEX 20 MIN: CPT | Mod: GP | Performed by: PHYSICAL THERAPIST

## 2020-06-11 PROCEDURE — 40000275 ZZH STATISTIC RCP TIME EA 10 MIN

## 2020-06-11 PROCEDURE — 36415 COLL VENOUS BLD VENIPUNCTURE: CPT | Performed by: PHYSICIAN ASSISTANT

## 2020-06-11 RX ORDER — POLYETHYLENE GLYCOL 3350 17 G/17G
17 POWDER, FOR SOLUTION ORAL DAILY PRN
Status: DISCONTINUED | OUTPATIENT
Start: 2020-06-11 | End: 2020-06-12 | Stop reason: HOSPADM

## 2020-06-11 RX ORDER — GUAIFENESIN 600 MG/1
600 TABLET, EXTENDED RELEASE ORAL 2 TIMES DAILY PRN
Status: DISCONTINUED | OUTPATIENT
Start: 2020-06-11 | End: 2020-06-12 | Stop reason: HOSPADM

## 2020-06-11 RX ORDER — BISACODYL 10 MG
10 SUPPOSITORY, RECTAL RECTAL DAILY PRN
Status: DISCONTINUED | OUTPATIENT
Start: 2020-06-11 | End: 2020-06-12 | Stop reason: HOSPADM

## 2020-06-11 RX ORDER — ACETAMINOPHEN 325 MG/1
975 TABLET ORAL EVERY 8 HOURS
Status: DISCONTINUED | OUTPATIENT
Start: 2020-06-11 | End: 2020-06-12 | Stop reason: HOSPADM

## 2020-06-11 RX ORDER — MORPHINE SULFATE 2 MG/ML
2 INJECTION, SOLUTION INTRAMUSCULAR; INTRAVENOUS ONCE
Status: COMPLETED | OUTPATIENT
Start: 2020-06-11 | End: 2020-06-11

## 2020-06-11 RX ORDER — LIDOCAINE 40 MG/G
CREAM TOPICAL
Status: DISCONTINUED | OUTPATIENT
Start: 2020-06-11 | End: 2020-06-12 | Stop reason: HOSPADM

## 2020-06-11 RX ORDER — FAMOTIDINE 10 MG
10 TABLET ORAL EVERY MORNING
COMMUNITY
End: 2023-01-01

## 2020-06-11 RX ORDER — PROCHLORPERAZINE MALEATE 5 MG
5 TABLET ORAL EVERY 6 HOURS PRN
Status: DISCONTINUED | OUTPATIENT
Start: 2020-06-11 | End: 2020-06-12 | Stop reason: HOSPADM

## 2020-06-11 RX ORDER — METOPROLOL SUCCINATE 50 MG/1
50 TABLET, EXTENDED RELEASE ORAL DAILY
Status: DISCONTINUED | OUTPATIENT
Start: 2020-06-11 | End: 2020-06-12 | Stop reason: HOSPADM

## 2020-06-11 RX ORDER — PROCHLORPERAZINE 25 MG
12.5 SUPPOSITORY, RECTAL RECTAL EVERY 12 HOURS PRN
Status: DISCONTINUED | OUTPATIENT
Start: 2020-06-11 | End: 2020-06-12 | Stop reason: HOSPADM

## 2020-06-11 RX ORDER — NALOXONE HYDROCHLORIDE 0.4 MG/ML
.1-.4 INJECTION, SOLUTION INTRAMUSCULAR; INTRAVENOUS; SUBCUTANEOUS
Status: DISCONTINUED | OUTPATIENT
Start: 2020-06-11 | End: 2020-06-12 | Stop reason: HOSPADM

## 2020-06-11 RX ORDER — HYDROMORPHONE HYDROCHLORIDE 1 MG/ML
0.2 INJECTION, SOLUTION INTRAMUSCULAR; INTRAVENOUS; SUBCUTANEOUS
Status: DISCONTINUED | OUTPATIENT
Start: 2020-06-11 | End: 2020-06-12

## 2020-06-11 RX ORDER — ONDANSETRON 2 MG/ML
4 INJECTION INTRAMUSCULAR; INTRAVENOUS EVERY 6 HOURS PRN
Status: DISCONTINUED | OUTPATIENT
Start: 2020-06-11 | End: 2020-06-12 | Stop reason: HOSPADM

## 2020-06-11 RX ORDER — ONDANSETRON 4 MG/1
4 TABLET, ORALLY DISINTEGRATING ORAL EVERY 6 HOURS PRN
Status: DISCONTINUED | OUTPATIENT
Start: 2020-06-11 | End: 2020-06-12 | Stop reason: HOSPADM

## 2020-06-11 RX ORDER — ACETAMINOPHEN 325 MG/1
650 TABLET ORAL EVERY 4 HOURS PRN
Status: DISCONTINUED | OUTPATIENT
Start: 2020-06-11 | End: 2020-06-11

## 2020-06-11 RX ORDER — METOPROLOL TARTRATE 1 MG/ML
2.5 INJECTION, SOLUTION INTRAVENOUS EVERY 4 HOURS PRN
Status: DISCONTINUED | OUTPATIENT
Start: 2020-06-11 | End: 2020-06-12 | Stop reason: HOSPADM

## 2020-06-11 RX ORDER — AMOXICILLIN 250 MG
1 CAPSULE ORAL 2 TIMES DAILY
Status: DISCONTINUED | OUTPATIENT
Start: 2020-06-11 | End: 2020-06-12 | Stop reason: HOSPADM

## 2020-06-11 RX ORDER — AMOXICILLIN 250 MG
2 CAPSULE ORAL 2 TIMES DAILY
Status: DISCONTINUED | OUTPATIENT
Start: 2020-06-11 | End: 2020-06-12 | Stop reason: HOSPADM

## 2020-06-11 RX ORDER — GINSENG 100 MG
CAPSULE ORAL
Status: DISCONTINUED | OUTPATIENT
Start: 2020-06-11 | End: 2020-06-12 | Stop reason: HOSPADM

## 2020-06-11 RX ADMIN — GUAIFENESIN 600 MG: 600 TABLET, EXTENDED RELEASE ORAL at 21:34

## 2020-06-11 RX ADMIN — SENNOSIDES, DOCUSATE SODIUM 2 TABLET: 50; 8.6 TABLET, FILM COATED ORAL at 08:33

## 2020-06-11 RX ADMIN — METOPROLOL SUCCINATE 50 MG: 50 TABLET, EXTENDED RELEASE ORAL at 08:33

## 2020-06-11 RX ADMIN — HYDROMORPHONE HYDROCHLORIDE 0.2 MG: 1 INJECTION, SOLUTION INTRAMUSCULAR; INTRAVENOUS; SUBCUTANEOUS at 02:50

## 2020-06-11 RX ADMIN — ACETAMINOPHEN 975 MG: 325 TABLET, FILM COATED ORAL at 13:10

## 2020-06-11 RX ADMIN — SENNOSIDES, DOCUSATE SODIUM 2 TABLET: 50; 8.6 TABLET, FILM COATED ORAL at 20:39

## 2020-06-11 RX ADMIN — MORPHINE SULFATE 2 MG: 2 INJECTION, SOLUTION INTRAMUSCULAR; INTRAVENOUS at 00:26

## 2020-06-11 RX ADMIN — BACITRACIN: 500 OINTMENT TOPICAL at 13:05

## 2020-06-11 RX ADMIN — ACETAMINOPHEN 975 MG: 325 TABLET, FILM COATED ORAL at 21:34

## 2020-06-11 RX ADMIN — ACETAMINOPHEN 650 MG: 325 TABLET, FILM COATED ORAL at 08:33

## 2020-06-11 RX ADMIN — TRAMADOL HYDROCHLORIDE 25 MG: 50 TABLET, FILM COATED ORAL at 20:38

## 2020-06-11 ASSESSMENT — ACTIVITIES OF DAILY LIVING (ADL)
ADLS_ACUITY_SCORE: 15

## 2020-06-11 ASSESSMENT — MIFFLIN-ST. JEOR: SCORE: 1555.36

## 2020-06-11 NOTE — H&P
[facility]   Trauma Surgical H&P            Assessment and Plan:   Assessment:   William Ba is a 85 year old male who presents after fall from standing, hitting left side on a coffee table.    Acute traumatic injuries by imaging (CT head, chest and abdomen/pelvis): Left 8th rib fracture, nondisplaced and small left pneumothorax.    Comorbidities:  has a past medical history of Abdominal aortic aneurysm without rupture (H) (2/25/2016), Acute CVA (cerebrovascular accident) (H) (3/28/2019), Atrial fibrillation/flutter, Atrial fibrillation/flutter (H) (11/13/2019), COPD (chronic obstructive pulmonary disease), Coronary atherosclerosis (1/29/2015), and Mixed hyperlipidemia (11/8/2010).        Plan:   Admit to trauma service.  He is saturating normally on room air and without dyspnea so will observe pneumothorax at this time.   Pain control - limit narcotics as possible to limit confusion  Monitor O2 sats continuous pulse ox  IS instruction and teaching.  Pulmonary toilet.  CXR    Consults: Hospitalist  Thoracic surgery    Case discussed with ED provider: Dr Teague       This was done as a telephone visit during COVID-19 pandemic as patient has a COVID swab pending, utilizing physical exam by Dr. Teague             Chief Complaint:   Fall      History is obtained from the patient.         History of Present Illness:   William Ba is a 85 year old male who presented to the ED after fall from standing. States he was in his living room between the TV and coffee table and tripped, hitting his left side on the coffee table. Had pain on the left side. Denies hitting his head.  Was brought in by EMS.       Negative loss of consciousnes.  EtOH use immediately prior to incident:  No  Illicit drug use immediately prior to incident:  No  Anticoagulation:  Yes      History of syncope:  Doesn't know   History of falls:  Yes   At baseline ambulates independently, states he doesn't use a cane or walker.  Lives with  wife in an apartment    Complains of left sided chest wall pain.      Denies shortness of breath, abdominal pain, nausea, emesis, dizziness, visual changes, headache, neck pain, back pain, extremity pain.        Had another recent fall and has a brace for an L4 compression fracture. He is not sure where he was seen for this. States daughter is a nurse.         Past Medical History:    has a past medical history of Abdominal aortic aneurysm without rupture (H) (2016), Acute CVA (cerebrovascular accident) (H) (3/28/2019), Atrial fibrillation/flutter, Atrial fibrillation/flutter (H) (2019), COPD (chronic obstructive pulmonary disease), Coronary atherosclerosis (2015), and Mixed hyperlipidemia (2010).          Past Surgical History:   Bilateral knee replacements         Social History:     Social History     Tobacco Use     Smoking status: Former Smoker     Packs/day: 1.00     Last attempt to quit: 2002     Years since quittin.4     Smokeless tobacco: Never Used   Substance Use Topics     Alcohol use: Not Currently   lives with wife          Family History:     Family History   Problem Relation Age of Onset     Abdominal Aortic Aneurysm Father             Allergies:   No Known Allergies            Medications:   Pt unsure of medications, knows he is on a blood thinner but not sure which one           Review of Systems:   The 10 point review of systems is negative other than noted in the HPI.           Physical Exam:   /79   Pulse 61   Resp 18   SpO2 95%   Intitial consult done by telephone, exam limited, see ED physician full physical exam  General: Generally sounds not in distress  Psych: Alert and Oriented to situation and place  Respiratory: there is no obvious dyspnea and speaking in full sentences         Data:   WBC -   WBC   Date Value Ref Range Status   06/10/2020 8.7 4.0 - 11.0 10e9/L Final   ], HgB -   Hemoglobin   Date Value Ref Range Status   06/10/2020 12.7 (L) 13.3 - 17.7  g/dL Final   ]   Liver Function Studies - No results for input(s): PROTTOTAL, ALBUMIN, BILITOTAL, ALKPHOS, AST, ALT, BILIDIRECT in the last 30488 hours.      IMAGING:  Results for orders placed or performed during the hospital encounter of 06/10/20   CT Head w/o Contrast    Narrative    EXAM: CT HEAD W/O CONTRAST  LOCATION: [facility]  DATE/TIME: 6/10/2020 11:13 PM    INDICATION: Fall. Anticoagulated.  COMPARISON: None.  TECHNIQUE: Routine without IV contrast. Multiplanar reformats. Dose reduction techniques were used.    FINDINGS:  INTRACRANIAL CONTENTS: No intracranial hemorrhage, extraaxial collection, or mass effect.  No CT evidence of acute infarct. Mild to moderate presumed chronic small vessel ischemic changes. Mild generalized volume loss. No hydrocephalus.     VISUALIZED ORBITS/SINUSES/MASTOIDS: Prior bilateral cataract surgery. Visualized portions of the orbits are otherwise unremarkable. No paranasal sinus mucosal disease. No middle ear or mastoid effusion.    BONES/SOFT TISSUES: No acute abnormality.      Impression    IMPRESSION:  1.  No CT evidence for acute intracranial process.  2.  Brain atrophy and presumed chronic microvascular ischemic changes as above.   CT Abdomen Pelvis w Contrast    Narrative    EXAM: CT ABDOMEN PELVIS W CONTRAST  LOCATION: [facility]  DATE/TIME: 6/10/2020 11:12 PM    INDICATION: Pain after fall. Left flank pain.  COMPARISON: None.  TECHNIQUE: CT scan of the abdomen and pelvis was performed following injection of IV contrast. Multiplanar reformats were obtained. Dose reduction techniques were used.  CONTRAST: 93mL Isovue-370    FINDINGS:   LOWER CHEST: Small to moderate size anterior left basilar pneumothorax, incompletely evaluated. Emphysema. Small left pleural effusion. Small esophageal hiatal hernia.    HEPATOBILIARY: Fatty infiltration of the liver.    PANCREAS: Normal.    SPLEEN: Normal.    ADRENAL GLANDS: Normal.    KIDNEYS/BLADDER: Tiny  bilateral renal cysts.    BOWEL: No evidence for bowel obstruction. A few scattered diverticula sigmoid colon.    LYMPH NODES: Normal.    VASCULATURE: Atherosclerotic disease abdominal aorta with 3.1 x 2 9 cm infrarenal abdominal aortic aneurysm just above the bifurcation. Ectasia involving the right and left common iliac arteries    PELVIC ORGANS: No ascites, hemorrhage, or free air. Radiation seeds in the prostate gland.    MUSCULOSKELETAL: Degenerative disc disease lumbar spine. Superior endplate compression L3 vertebral body, presumed chronic.      Impression    IMPRESSION:   1.  Anterior left basilar pneumothorax, incompletely evaluated. A complete CT chest exam is recommended for further evaluation.  2.  Small left pleural effusion.  3.  No evidence for traumatic injury within the abdomen or pelvis.  4.  3.1 x 2.9 cm abdominal aortic aneurysm.     Chest CT w/o contrast    Narrative    EXAM: CT CHEST W/O CONTRAST  LOCATION: [facility]  DATE/TIME: 6/11/2020 12:12 AM    INDICATION: Pneumothorax, 85-year-old with history of COPD, with rib pain after fall.    COMPARISON: 6/10/2020.    TECHNIQUE: CT chest without IV contrast. Multiplanar reformats were obtained. Dose reduction techniques were used.    CONTRAST: None.    FINDINGS:   LUNGS AND PLEURA: Small left hemopneumothorax seen on a background of moderate to severe emphysematous change.     MEDIASTINUM/AXILLAE: No mediastinal shift. Atherosclerotic calcifications of the aorta and coronary arteries. No hilar or mediastinal lymphadenopathy.    UPPER ABDOMEN: Please see dedicated abdominal series performed same day.    MUSCULOSKELETAL: Nondisplaced fracture of the lateral left eighth rib.      Impression    IMPRESSION:   1.  Small left hemopneumothorax, not significantly changed in size in comparison with study dated 6/10/2020.    2.  Nondisplaced fracture of the lateral left eighth rib.         Cathy Mathew MD    This was done as a telephone  visit during COVID-19 pandemic as patient has a COVID swab pending, utilizing physician exam by Dr. Teague  Time spent with the patient, reviewing the EMR, reviewing laboratory and imaging studies, more than 50% of which was counseling and coordinating care:  15 minutes.

## 2020-06-11 NOTE — UTILIZATION REVIEW
Admission Status; Secondary Review Determination       Under the authority of the Utilization Management Committee, the utilization review process indicated a secondary review on the above patient. The review outcome is based on review of the medical records, discussions with staff, and applying clinical experience noted on the date of the review.     (x) Inpatient Status Appropriate - This patient's medical care is consistent with medical management for inpatient care and reasonable inpatient medical practice.     RATIONALE FOR DETERMINATION   85-year-old woman fell from a standing position presenting with trauma, has rib fracture with a pneumothorax, needs further evaluation admitted to inpatient, presented to the emergency room and started receiving care prior to midnight in a Medicare patient with small left pleural effusion at the same side of the pneumothorax also her chest CT scan suggest a hemopneumothorax. The expected length of stay at the time of admission was more than 2 midnights because of the severity of illness, intensity of service provided, and risk for adverse outcome. Inpatient admission is appropriate based on the Medicare guidelines.     This document was produced using voice recognition software       The information on this document is developed by the utilization review team in order for the business office to ensure compliance. This only denotes the appropriateness of proper admission status and does not reflect the quality of care rendered.   The definitions of Inpatient Status and Observation Status used in making the determination above are those provided in the CMS Coverage Manual, Chapter 1 and Chapter 6, section 70.4.   Sincerely,   ERWIN PEREYRA MD   System Medical Director   Utilization Management   [facility].

## 2020-06-11 NOTE — ED PROVIDER NOTES
History     Chief Complaint:  Fall and Rib Pain      HPI   Patient's history is limited secondary to dementia.     William Ba is a 85 year old male with a history of COPD, atrial fibrillation, hyperlipidemia, AAA and recent L4 compression fracture who presents with rib pain after a fall. The patient reported that he was walking at home he tripped and fell striking his left sided of his chest on a coffee table. He denied hitting his head or losing consciousness. He was brought by EMS to the ED for evaluation. En route he was given 75 mcg of fentanyl.     Allergies:  The patient has no known drug allergies.    Medications:    Aspirin 81   Lipitor   Pepcid   Breo ellipta   Toprol XL   Xarelto   Saw [place]      Past Medical History:    Atrial fibrillation   COPD   Acute CVA   AAA without rupture   Coronary atherosclerosis   hyperlipidemia     Past Surgical History:    The patient does not have any pertinent past surgical history.     Family History:    AAA     Social History:  Tobacco use: Former smoker   Alcohol use: Not currently   Marital Status:   [2]     Review of Systems   Cardiovascular: Positive for chest pain.   All other systems reviewed and are negative.        Physical Exam     Patient Vitals for the past 24 hrs:   BP Pulse Resp SpO2   06/10/20 2208 133/81 61 18 98 %       Physical Exam  General: The patient is alert, in no respiratory distress.    HENT: Mucous membranes moist.    Cardiovascular:Good pulses in all four extremities. Normal capillary refill and skin turgor.     Respiratory: Slightly decreased breath sounds but the patient is splinting.  Full sentences    Gastrointestinal: Abdomen soft. No guarding, no rebound. No palpable hernias. Tenderness over left hip. Abdominal binder in place.     Musculoskeletal: No gross deformity.     Skin: No rashes or petechiae.     Neurologic: The patient is alert and oriented. GCS 15. No testable cranial nerve deficit. Follows commands with clear  and appropriate speech. Gives appropriate answers. Good strength in all extremities. No gross neurologic deficit. Gross sensation intact. Pupils are round and reactive. No meningismus.     Lymphatic: No cervical adenopathy. No lower extremity swelling.    Psychiatric: The patient is non-tearful.      Emergency Department Course   ECG:  Indication: Fall   Time: 2245  Vent. Rate 64 bpm. WY interval *. QRS duration 94. QT/QTc 37. P-R-T axis -22 11 -9.  Atrial fibrillation with 4:1 AV conduction. Low voltage QRS. Read time: 2247     Imaging:  Radiographic findings were communicated with the patient who voiced understanding of the findings.    CT Head without contrast:   1.  No CT evidence for acute intracranial process.   2.  Brain atrophy and presumed chronic microvascular ischemic changes as above, as per radiology.     CT Chest w/o IV contrast:   1.  Small left hemopneumothorax, not significantly changed in size in comparison with study dated 6/10/2020.   2.  Nondisplaced fracture of the lateral left eighth rib, As per radiology.     CT Abdomen/Pelvis with IV contrast:   1.  Anterior left basilar pneumothorax, incompletely evaluated. A complete CT chest exam is recommended for further evaluation.   2.  Small left pleural effusion.   3.  No evidence for traumatic injury within the abdomen or pelvis.   4.  3.1 x 2.9 cm abdominal aortic aneurysm, as per radiology.      Laboratory:  CBC: WBC: 8.7, HGB: 12.7 (L), PLT: 138 (L)   BMP: Chloride: 111 (H), Calcium: 8.4 (L), o/w WNL (Creatinine: 0.83)    INR: 2.68 (H)    Creatinine POCT: all WNL     Interventions:  2314  Iopamidol 93 ml IV   2353 Percocet 5-325 mg PO   0026 Morphine 2 mg IV     Emergency Department Course:  Nursing notes and vitals reviewed. (6883) I performed an exam of the patient as documented above.     IV inserted. Medicine administered as documented above. Blood drawn. This was sent to the lab for further testing, results above.    The patient was sent  for a abdomen/pelvis CT while in the emergency department, findings above.   EKG obtained in the ED, see results above.      (0004) I rechecked the patient and discussed the results of his workup thus far. Patient is requesting more pain medication.    0115 the patient was discussed with Dr. Lowry who will gladly accept the patient for admission.    (0129) I consulted with Dr. Ivonne Finch  , Thoracic, regarding the patient's history and presentation here in the emergency department.  He agreed to the plan of holding a chest tube.          I personally reviewed the laboratory results with the Patient and answered all related questions prior to discharge.     Impression & Plan      Medical Decision Making:  The patient presents after a fall.  I question him at length the mechanism behind this and discussed with his daughter as well.  It appears that he was attempting to go between a PNO and another piece of furniture when he purely tripped and landed with his left chest against the coffee table.  He reports mainly pain more in the flank with no respiratory distress.  He is wearing an abdominal binder and I did consider that there this could be exacerbation of his previous L4 fracture.  The patient showed no respiratory distress but does have a history of COPD.  He is currently chronically anticoagulated due to A. fib.  His EKG demonstrates a flutter.  He was sent for a CT scan of his head as well as abdomen pelvis.  There was a small pneumothorax on this and a follow-up chest CT without contrast noted a nondisplaced rib fracture and a pneumohemothorax that was quite small.  Discussed with with with both general surgery as well as thoracic surgery we will hold off especially with the patient being in no respiratory distress and currently anticoagulated on Xarelto.  He was admitted to hospital does not require oxygen and only minimal doses of pain medication he will receive a repeat chest x-ray in 4 hours about 6 AM to  ensure that does not worsen at which point he potentially could need a chest tube.    Diagnosis:  1. Fall, initial encounter    2. Hemopneumothorax on left    3. Chronic anticoagulation    4. Closed fracture of one rib of left side, initial encounter          Disposition:  Admitted to an inpatient bed    Discharge Medications:  New Prescriptions    No medications on file     Scribe Disclosure:  I, Renata Ford, am serving as a scribe on 6/10/2020 at 10:58 PM to personally document services performed by Huey Teague MD based on my observations and the provider's statements to me.     Renata Ford  6/10/2020   [facility] EMERGENCY DEPARTMENT       Huey Teague MD  06/11/20 0132

## 2020-06-11 NOTE — PLAN OF CARE
0606 - MD - New admit. Called daughter in regards to pt increased confusion. Dgtr notified staff on how he needs his Breo Ellipta by 0830 otherwise he has a coughing spell that will last all day. Please review. Thanks!    Pt was more oriented upon arrival. Knew he had fallen and that he had hurt so that is why he had come in. Towards morning, pt is unaware of where he is and why he is here. Pt attempted multiple times to get out of bed. Placed signs in regards to stay and notes for the pt throughout the morning. Denies pain this morning but was in severe pain earlier in which he received dilaudid IV X1. Glasses, dentures top and bottom, cell phone all with pt. Voiding adequately. Plan is to have a Chest x-ray this AM. Thoracic and Hospitalist consulted. Sitter for AM shift d/t increased confusion.

## 2020-06-11 NOTE — PROGRESS NOTES
[facility]    General Surgery Progress Note 6/11/2020         Assessment and Plan:   Assessment:    William Ba is a 85 year old male admitted to trauma service for fall from standing, hitting left side on a coffee table.    - Left 8th rib fracture   - small left pneumothorax   - saturating normally on room - monitor   - no other injures by imaging or exam (CT head, chest, abd/pelvis)      Plan:    - baseline CXR this morning   - thoracic surgery consult for pneumothorax   - pulmonary toilet - IS instruction and teaching   - minimal pain   - SW consult for discharge   - Hospitalist consult for medical management           Interval History:   Pt sitting at chair.  Sitter at bedside as pt is impulsive and at times disorientated/confused.  Today is orientated to person, somewhat to situation (knows he fell), unsure of location.    His only complaint of pain is mild discomfort along the left side chest wall. He denies any other complaints.  He is wearing a binder for previously know L4 compression fracture.          Physical Exam:   Temp: 98.8  F (37.1  C) Temp src: Temporal BP: 117/81 Pulse: 120 Heart Rate: 120 Resp: 20   SpO2: 97 % O2 Device: None (Room air)        No intake/output data recorded.    Constitutional: mild confusion, no distress   HEENT - Normocephalic. Atraumatic. Moist mucous membranes. PERRLA.  No scleral icterus. EOMI.   Neck - Supple without masses.   Chest: mild tenderness to palpation lateral left side.  Non labored breathing  Extremities - Moves all extremities, no pain to palpation along all long bones. No swelling or ecchymosis over joints. Warm without edema. Pulses equal bilaterally.   Back: non-tender to palpation along entire spine.                Data:   Data   Recent Labs   Lab 06/11/20  0901 06/10/20  2256   WBC 8.2 8.7   HGB 12.6* 12.7*   MCV 98 99   * 138*   INR  --  2.68*    141   POTASSIUM 3.9 4.0   CHLORIDE 108 111*   CO2 23 27   BUN 19 25   CR 0.67  0.83   ANIONGAP 9 3   NIEVES 8.0* 8.4*   GLC 90 87        Ngoc Acevedo PA-C     Seen and agree,    Sony Hagen MD  [facility]

## 2020-06-11 NOTE — PLAN OF CARE
OT-Attempted cognitive assessment. Patient was cooperative and came to EOB with SBA. Patient having difficulty focusing due to frequent L thigh cramps. In order to allow patient optimal focus to complete assessment, session ended, patient was assisted back to bed and will reschedule for following day.

## 2020-06-11 NOTE — PROGRESS NOTES
06/11/20 0947   Quick Adds   Type of Visit Initial PT Evaluation   Living Environment   Lives With spouse   Living Arrangements apartment   Home Accessibility no concerns   Transportation Anticipated family or friend will provide   Self-Care   Usual Activity Tolerance good   Current Activity Tolerance good   Regular Exercise No   Equipment Currently Used at Home none   Functional Level Prior   Ambulation 0-->independent   Transferring 0-->independent   Cognition 1 - attention or memory deficits   Fall history within last six months yes   Number of times patient has fallen within last six months 2   Which of the above functional risks had a recent onset or change? ambulation;transferring;toileting;bathing;dressing;fall history   General Information   Onset of Illness/Injury or Date of Surgery - Date 06/10/20   Referring Physician Cathy Mathew MD    Patient/Family Goals Statement Discharge home   Pertinent History of Current Problem (include personal factors and/or comorbidities that impact the POC) William Ba is a 85 year old male with a history of paroxysmal atrial fibrillation/flutter, CAD,  COPD, and abdominal aortic aneurysm, remote prostate cancer, who was admitted to the trauma service after a mechanical fall in his home over a coffee table   Precautions/Limitations fall precautions   Weight-Bearing Status - LLE full weight-bearing   Weight-Bearing Status - RLE full weight-bearing   General Observations Pt supine upon initiation, agreeable to session.    Cognitive Status Examination   Orientation person;place   Level of Consciousness alert   Follows Commands and Answers Questions 100% of the time   Personal Safety and Judgment at risk behaviors demonstrated   Memory impaired   Pain Assessment   Patient Currently in Pain No   Integumentary/Edema   Integumentary/Edema no deficits were identifed   Posture    Posture Forward head position;Protracted shoulders   Range of Motion (ROM)   ROM Comment WFL  "  Strength   Strength Comments WFL   Bed Mobility   Bed Mobility Comments sit<>supine indep   Transfer Skills   Transfer Comments sit<>stand indep   Gait   Gait Comments SBA with no AD   Balance   Balance Comments intact   Sensory Examination   Sensory Perception no deficits were identified   Coordination   Coordination no deficits were identified   Muscle Tone   Muscle Tone no deficits were identified   Clinical Impression   Criteria for Skilled Therapeutic Intervention evaluation only   PT Diagnosis none   Clinical Presentation Stable/Uncomplicated   Clinical Presentation Rationale clear POC   Clinical Decision Making (Complexity) Low complexity   Predicted Duration of Therapy Intervention (days/wks) eval only   Anticipated Discharge Disposition Home with Assist   Risk & Benefits of therapy have been explained Yes   Patient, Family & other staff in agreement with plan of care Yes   [facility] TM \"6 Clicks\"   2016, Trustees of [facility], under license to [employer].  All rights reserved.   6 Clicks Short Forms Basic Mobility Inpatient Short Form   [facility]  \"6 Clicks\" V.2 Basic Mobility Inpatient Short Form   1. Turning from your back to your side while in a flat bed without using bedrails? 4 - None   2. Moving from lying on your back to sitting on the side of a flat bed without using bedrails? 4 - None   3. Moving to and from a bed to a chair (including a wheelchair)? 4 - None   4. Standing up from a chair using your arms (e.g., wheelchair, or bedside chair)? 4 - None   5. To walk in hospital room? 4 - None   6. Climbing 3-5 steps with a railing? 4 - None   Basic Mobility Raw Score (Score out of 24.Lower scores equate to lower levels of function) 24   Total Evaluation Time   Total Evaluation Time (Minutes) 16     "

## 2020-06-11 NOTE — PLAN OF CARE
PT: Orders received. PT evaluation completed. Pt reports living in an apartment with his spouse. Pt reports that the spouse assists at home, but does not clarify with what. It appears that the pt is typically indep with all mobility at baseline. Pt now with 2 falls in past 2 weeks resulting in spinal fracture and rib fracture.     Discharge Planner PT   Patient plan for discharge: Home  Current status: Pt supine upon initiation, agreeable to session. Pt completes sit<>supine indep. Pt completes sit<>stand indep. Pt ambulates 30' with no assistive device and SBA/indep. Pt appears very steady throughout. Anticipate at this point in time the pts greatest limiting factor is cognition/safety awareness. Will request OT consult to help identify where his deficits are and what support he would require at home.   Barriers to return to prior living situation: None anticipated  Recommendations for discharge: Home  Rationale for recommendations: pt is mobilizing at near/baseline mobility. No further IP PT needs identified.        Entered by: Lexis Briones 06/11/2020 9:53 AM

## 2020-06-11 NOTE — CONSULTS
THORACIC SURGERY CONSULT NOTE    Consult Reason: Left pneumothorax    HPI: William Ba is a 85 year old male with multiple medical problems including dementia, AAA, CVA, atrial fibrillation, COPD who came to the ED on 6/10/2020 after sustaining a fall and hitting his left chest on the coffee table.  CT head was negative for intracranial process, however CT chest revealed fracture of the left eighth rib and small hemopneumothorax.  Currently anticoagulated on Xarelto and chest tube was not placed in the ED.  Denies hitting his head and no vomiting, diarrhea.     A/P: Patient is a 85 year old male with left rib fracture and traumatic left pneumothorax after sustaining mechanical fall on 6/10/2020.  -CT chest on 6/10 with small left pneumothorax  -CXR today stable and no chest tube indicated  -CXR in 2 weeks with PCP  -Encourage IS  -Ambulate  -No further recommendations at this time, please call with questions.     Patient and plan discussed with attending.    Thank you for the opportunity to participate in the care of this patient.    PMH:  Past Medical History:   Diagnosis Date     Abdominal aortic aneurysm without rupture (H) 2/25/2016     Acute CVA (cerebrovascular accident) (H) 3/28/2019     Atrial fibrillation/flutter      Atrial fibrillation/flutter (H) 11/13/2019     COPD (chronic obstructive pulmonary disease)      Coronary atherosclerosis 1/29/2015     Mixed hyperlipidemia 11/8/2010       PSH:  No past surgical history on file.    FH:  family history includes Abdominal Aortic Aneurysm in his father.    SH:  Former Tobacco use  No EtOH use  No Illicit drug use    Allergies:  No Known Allergies    Home Meds:  Medications Prior to Admission   Medication Sig Dispense Refill Last Dose     aspirin 81 MG EC tablet Take 81 mg by mouth daily    6/10/2020 at am     atorvastatin (LIPITOR) 40 MG tablet Take 40 mg by mouth At Bedtime    6/9/2020 at hs     famotidine (PEPCID) 10 MG tablet Take 10 mg by mouth every  evening   6/9/2020 at pm     metoprolol succinate ER (TOPROL-XL) 50 MG 24 hr tablet Take 1 tablet (50 mg) by mouth daily 90 tablet 3 6/10/2020 at am     Misc Natural Products (OSTEO BI-FLEX ADV TRIPLE ST) TABS Take 1 capsule by mouth 2 times daily    6/10/2020 at x 1     Multiple Vitamins-Minerals (MULTIVITAMIN ADULT PO) Take one(1) tablet daily.   6/10/2020 at am     rivaroxaban ANTICOAGULANT (XARELTO) 20 MG TABS tablet Take 20 mg by mouth daily (with dinner)   6/9/2020 at pm     Saw [place] 160 MG CAPS Take 1 capsule by mouth daily    6/10/2020 at am     docusate sodium (COLACE) 100 MG capsule Take 100 mg by mouth every evening    6/9/2020 at pm     fluticasone-vilanterol (BREO ELLIPTA) 100-25 MCG/INH inhaler Inhale 1 puff into the lungs   6/10/2020 at am       ROS: Denies fevers, chills, HA, abdominal pain, N/V, diarrhea.    Physical Exam:  Temp:  [97.8  F (36.6  C)-98.8  F (37.1  C)] 98.8  F (37.1  C)  Pulse:  [] 120  Heart Rate:  [120] 120  Resp:  [16-20] 20  BP: (102-135)/() 117/81  SpO2:  [95 %-100 %] 97 %    Gen: NAD, resting comfortably in chair  Lungs: Non-labored breathing  Ext:  No pitting edema  Neuro: Alert    Labs:  CBC  Recent Labs   Lab 06/11/20  0901 06/10/20  2256   WBC 8.2 8.7   HGB 12.6* 12.7*   * 138*     BMP  Recent Labs   Lab 06/11/20  0901 06/10/20  2256    141   POTASSIUM 3.9 4.0   CHLORIDE 108 111*   CO2 23 27   BUN 19 25   CR 0.67 0.83   GLC 90 87     LFT  Recent Labs   Lab 06/10/20  2256   INR 2.68*       Imaging:  CXR stable tiny left pneumothorax          Tabitha Alford PA-C

## 2020-06-11 NOTE — ED NOTES
[facility]  ED Nurse Handoff Report    William Ba is a 85 year old male   ED Chief complaint: Fall and Rib Pain  . ED Diagnosis:   Final diagnoses:   Fall, initial encounter   Hemopneumothorax on left   Chronic anticoagulation   Closed fracture of one rib of left side, initial encounter     Allergies: No Known Allergies    Code Status: Full Code  Activity level - Baseline/Home:  Independent. Activity Level - Current:   Assist X 1. Lift room needed: No. Bariatric: No   Needed: No   Isolation: No. Infection: Not Applicable.     Vital Signs:   Vitals:    06/10/20 2208 06/11/20 0030 06/11/20 0032 06/11/20 0054   BP: 133/81 (!) 126/101  117/79   Pulse: 61      Resp: 18      SpO2: 98% 100% 96% 95%       Cardiac Rhythm:  ,      Pain level: 0-10 Pain Scale: 8  Patient confused: No. Patient Falls Risk: Yes.   Elimination Status: NA   Patient Report - Initial Complaint: William Ba is a 85 year old male with a history of COPD, atrial fibrillation, hyperlipidemia, AAA and recent L4 compression fracture who presents with rib pain after a fall. The patient reported that he was walking at home he tripped and fell striking his left sided of his chest on a coffee table. He denied hitting his head or losing consciousness. He was brought by EMS to the ED for evaluation. En route he was given 75 mcg of fentanyl. Focused Assessment: Left sided chest pain. Respiration even and unlabored.  Tests Performed: labs, CT head/chest/abdomen/pelvis. Abnormal Results:   Labs Ordered and Resulted from Time of ED Arrival Up to the Time of Departure from the ED   CBC WITH PLATELETS DIFFERENTIAL - Abnormal; Notable for the following components:       Result Value    RBC Count 4.06 (*)     Hemoglobin 12.7 (*)     Platelet Count 138 (*)     All other components within normal limits   INR - Abnormal; Notable for the following components:    INR 2.68 (*)     All other components within normal limits   BASIC METABOLIC PANEL  - Abnormal; Notable for the following components:    Chloride 111 (*)     Calcium 8.4 (*)     All other components within normal limits   CREATININE POCT   COVID-19 VIRUS (CORONAVIRUS) BY PCR   PERIPHERAL IV CATHETER   ISTAT CREATININE NURSING POCT   FREE WATER   IMPRESSION:   1.  Anterior left basilar pneumothorax, incompletely evaluated. A complete CT chest exam is recommended for further evaluation.  2.  Small left pleural effusion.  3.  No evidence for traumatic injury within the abdomen or pelvis.  4.  3.1 x 2.9 cm abdominal aortic aneurysm.  IMPRESSION:   1.  Small left hemopneumothorax, not significantly changed in size in comparison with study dated 6/10/2020.     2.  Nondisplaced fracture of the lateral left eighth rib.  MPRESSION:  1.  No CT evidence for acute intracranial process.  2.  Brain atrophy and presumed chronic microvascular ischemic changes as above.  Treatments provided: Pain control  Family Comments: NA  OBS brochure/video discussed/provided to patient:  N/A  ED Medications:   Medications   lidocaine 1 % 0.1-1 mL (has no administration in time range)   lidocaine (LMX4) cream (has no administration in time range)   sodium chloride (PF) 0.9% PF flush 3 mL (has no administration in time range)   sodium chloride (PF) 0.9% PF flush 3 mL (has no administration in time range)   CT Scan Flush (63 mLs Intravenous Given 6/10/20 2314)   iopamidol (ISOVUE-370) solution 500 mL (93 mLs Intravenous Given 6/10/20 2314)   oxyCODONE-acetaminophen (PERCOCET) 5-325 MG per tablet 1 tablet (1 tablet Oral Given 6/10/20 8113)   morphine (PF) injection 2 mg (2 mg Intravenous Given 6/11/20 0026)     Drips infusing:  No  For the majority of the shift, the patient's behavior Green. Interventions performed were NA.    Sepsis treatment initiated: No       ED Nurse Name/Phone Number: Nile Rebollar RN,   1:33 AM  RECEIVING UNIT ED HANDOFF REVIEW    Above ED Nurse Handoff Report was reviewed: Yes  Reviewed by: Johanny Graham,  RN on June 11, 2020 at 2:00 AM

## 2020-06-11 NOTE — ED TRIAGE NOTES
Pt arrives via EMS s/p fall. Pt was walking, tripped and fell, striking the left side of chest to coffee table. Pt then landed on elbows. No LOC. Pt on thinners.  Pt c/o left rib pain. Pt arrives in back brace for L4 compression fracture that occurred earlier this week.

## 2020-06-11 NOTE — PHARMACY-ADMISSION MEDICATION HISTORY
Admission medication history interview status for this patient is complete. See [facility] admission navigator for allergy information, prior to admission medications and immunization status.     Medication history interview done via telephone during Covid-19 pandemic, indicate source(s): Erin Mensah  Medication history resources (including written lists, pill bottles, clinic record):None    Changes made to PTA medication list:  Added: none  Deleted: none  Changed: none    Actions taken by pharmacist (provider contacted, etc):None   Additional medication history information: per Daughter, VERY IMPORTANT that patient has Breo inhaler administered no later than 08:00 every morning  Medication reconciliation/reorder completed by provider prior to medication history?  no      Prior to Admission medications    Medication Sig Last Dose Taking? Auth Provider   aspirin 81 MG EC tablet Take 81 mg by mouth daily  6/10/2020 at am Yes Reported, Patient   atorvastatin (LIPITOR) 40 MG tablet Take 40 mg by mouth At Bedtime  6/9/2020 at hs Yes Reported, Patient   famotidine (PEPCID) 10 MG tablet Take 10 mg by mouth every evening 6/9/2020 at pm Yes Unknown, Entered By History   metoprolol succinate ER (TOPROL-XL) 50 MG 24 hr tablet Take 1 tablet (50 mg) by mouth daily 6/10/2020 at am Yes Zora Moreno, APRN CNP   Misc Natural Products (OSTEO BI-FLEX ADV TRIPLE ST) TABS Take 1 capsule by mouth 2 times daily  6/10/2020 at x 1 Yes Reported, Patient   Multiple Vitamins-Minerals (MULTIVITAMIN ADULT PO) Take one(1) tablet daily. 6/10/2020 at am Yes Reported, Patient   rivaroxaban ANTICOAGULANT (XARELTO) 20 MG TABS tablet Take 20 mg by mouth daily (with dinner) 6/9/2020 at pm Yes Unknown, Entered By History   Saw [place] 160 MG CAPS Take 1 capsule by mouth daily  6/10/2020 at am Yes Reported, Patient   docusate sodium (COLACE) 100 MG capsule Take 100 mg by mouth every evening  6/9/2020 at pm  Reported, Patient   fluticasone-vilanterol  (ELO CORONA) 100-25 MCG/INH inhaler Inhale 1 puff into the lungs 6/10/2020 at am  Reported, Patient

## 2020-06-11 NOTE — ED NOTES
Bed: ED20  Expected date: 6/10/20  Expected time: 9:50 PM  Means of arrival: Ambulance  Comments:  [id number]

## 2020-06-11 NOTE — CONSULTS
[facility]  Hospitalist Consult Note  Name: William Ba    MRN: [id number]  YOB: 1935    Age: 85 year old  Date of admission: 6/10/2020  Primary care provider: Adryan James     Requesting Physician:  Dr. Mathew  Reason for consult:  Medical Management         Assessment and Plan:   William Ba is a 85 year old male with a history of paroxysmal atrial fibrillation/flutter, CAD,  COPD, and abdominal aortic aneurysm, remote prostate cancer, who was admitted to the trauma service after a mechanical fall in his home over a coffee table.    #Fall  #Left nondisplaced eighth rib fracture, small left Small hemopneumothorax  Patient was admitted to trauma service after a fall from standing height in his home hitting his left side on a coffee table. He has controlled pain with oral percocet and is maintaining oxygen saturations on room air.  CT head chest abdomen pelvis negative for other acute traumatic injuries on admission. He does have known previous L4 compression fracture for which he wears binder (April), follows with Dr. Doran.  -Continue incentive spirometry  -monitor oxygen saturations with continuous pulse ox  -thoracic surgery has been consulted, chest tube placement deferred in ED    #Atrial Fibrillation vs paroxysmal Atrial Flutter   Variable ventricular rates over the past 24 hours   Investigated with a 48-hour Holter monitor in December where average heart rates were pretty high the 100s.   He has known history of paroxysmal atrial fibrillation/flutter, anticoagulated with Xarelto. In most recent cardiology clinic visit amiodarone therapy was discussed if rates failed control with increase in Metoprolol vs ablation.   - cardiac telemetry   - continue PTA Anticoagulation when appropriate if no bleeding or chest tube placement, likely 6/12(last dose of Xarelto (6/10 PM prior to falling)  - his home metprolol will be resumed, if we have difficulty controlling rates  "or he becomes symptomatic during hospitalization could consider Cardiology consultation   - IV metoprolol prn    #CAD  History of coronary artery disease s/p inferior wall MI, 100% RCA obstruction, 50% proximal LAD stenosis, 40% circumflex stenosis.  He is completely asymptomatic.  He is on baby aspirin, metoprolol, atorvastatin 40 mg daily which will be resumed.  -Continue statin therapy, aspirin    #History of abdominal aortic aneurysm  This has been largely stable for several years  CT scan last showing 2.9 cm.    #COPD.  Managed by Pulmonology.  The patient is also being followed closely by [facility].  - resume PTA Breo ellipta    #Cognitive Impairment  Appears to have underlying dementia that has not been formally diagnosed. Appears to be at baseline per daughter when tired has difficulty recalling date, time, etc. Cooperative and Oriented to self and situation.   -re orient as needed  - recommend cautious use of narcotic medications     #Asymptomatic COVID 19  -pcr pending        Code status: Full code   Prophylaxis: PCD's  Disposition: Lives in [place] with wife, may need to consult SW at some point to assist      Thank you for the consultation, we will continue to follow along during the hospitalization. Please page with any questions or concerns.         History of Present Illness:   William Ba is a 85 year old male with a history of paroxysmal atrial fibrillation/flutter, CAD,  COPD, and abdominal aortic aneurysm, remote prostate cancer, who was admitted to the trauma service after a mechanical fall in his home over a coffee table.    On interview the patient states that he was walking around the corner of a coffee table when his \"legs got tangled up \"yesterday before falling from standing height in his home.  He lives in an assisted living facility with memory care for his wife who has dementia.  He again denies hitting his head.  He states that he does not ambulate with a walker or cane " at baseline.  This morning he feels his pain is controlled.  He has some discomfort with taking deep breaths.  He feels slightly short of breath.  He has no chest pain, palpitations.  No presyncope.                  Past Medical History:     Past Medical History:   Diagnosis Date     Abdominal aortic aneurysm without rupture (H) 2016     Acute CVA (cerebrovascular accident) (H) 3/28/2019     Atrial fibrillation/flutter      Atrial fibrillation/flutter (H) 2019     COPD (chronic obstructive pulmonary disease)      Coronary atherosclerosis 2015     Mixed hyperlipidemia 2010             Past Surgical History:   No past surgical history on file.            Social History:     Social History     Tobacco Use     Smoking status: Former Smoker     Packs/day: 1.00     Last attempt to quit:      Years since quittin.4     Smokeless tobacco: Never Used   Substance Use Topics     Alcohol use: Not Currently             Family History:   Family history was fully reviewed and non-contributory in this case.          Allergies:   No Known Allergies          Medications:     Prior to Admission medications    Medication Sig Last Dose Taking? Auth Provider   aspirin 81 MG EC tablet Take 81 mg by mouth   Reported, Patient   atorvastatin (LIPITOR) 40 MG tablet Take 40 mg by mouth   Reported, Patient   docusate sodium (COLACE) 100 MG capsule Take 100 mg by mouth daily    Reported, Patient   famotidine (PEPCID) 10 MG tablet Take 10 mg by mouth 2 times daily   Reported, Patient   fluticasone-vilanterol (BREO ELLIPTA) 100-25 MCG/INH inhaler Inhale 1 puff into the lungs   Reported, Patient   metoprolol succinate ER (TOPROL-XL) 50 MG 24 hr tablet Take 1 tablet (50 mg) by mouth daily   Zora Moreno, APRN CNP   Misc Natural Products (OSTEO BI-FLEX ADV TRIPLE ST) TABS Take 1 capsule by mouth   Reported, Patient   Multiple Vitamins-Minerals (MULTIVITAMIN ADULT PO) Take one(1) tablet daily.   Reported, Patient  "  rivaroxaban ANTICOAGULANT (XARELTO ANTICOAGULANT) 20 MG TABS tablet Take 20 mg by mouth daily    Reported, Patient   Saw [place] 160 MG CAPS    Reported, Patient       Current hospital administered medication list (MAR) also reviewed.          Review of Systems:     A comprehensive greater than 10 system review of systems was carried out.  Pertinent positives and negatives are noted above.  Otherwise negative for contributory info.            Physical Exam:   Blood pressure 130/83, pulse 128, temperature 98.8  F (37.1  C), temperature source Temporal, resp. rate 18, height 1.88 m (6' 2\"), weight 79.6 kg (175 lb 6.4 oz), SpO2 95 %.  Exam:  General: Alert, awake, no acute distress.  HEENT: Normocephalic and atraumatic, eyes anicteric and without scleral injection, EOMI, face symmetric, MMM.  Cardiac: Irregular rhythm with tachycardic rate, normal S1, S2. No m/g/r, no LE edema.  Pulmonary: Normal chest rise, normal work of breathing.  Left posterior lung sounds diminished. No crackles or wheezing. Pain with palpation of left sided lower ribs.   Abdomen: soft, non-tender, non-distended.  Normoactive bowel sounds, no guarding or rebound tenderness.  Extremities: no deformities.  Warm, well perfused.  No pain with weightbearing or hip girdle compression.  Skin: Scattered ecchymosis particularly in the left upper forearm. no rashes or lesions.  Warm and Dry.  Neuro: No focal deficits.  Speech clear.  Spontaneously moving all extremities in bed.   Psych: Oriented to self, situation, date. Appropriate affect.          Data:   Imaging:  Reviewed.    EKG/Telemetry:  None. This has been now ordered.     Labs: Reviewed.   Recent Labs   Lab 06/10/20  2256   WBC 8.7   HGB 12.7*   HCT 40.1   MCV 99   *     Recent Labs   Lab 06/10/20  2300 06/10/20  2256   NA  --  141   POTASSIUM  --  4.0   CHLORIDE  --  111*   CO2  --  27   ANIONGAP  --  3   GLC  --  87   BUN  --  25   CR  --  0.83   GFRESTIMATED >90 81   GFRESTBLACK >90 " >90   NIEVES  --  8.4*       Pita Fagan PA-C  [facility] Hospitalist  June 11, 2020

## 2020-06-12 ENCOUNTER — APPOINTMENT (OUTPATIENT)
Dept: OCCUPATIONAL THERAPY | Facility: CLINIC | Age: 85
DRG: 200 | End: 2020-06-12
Attending: INTERNAL MEDICINE
Payer: MEDICARE

## 2020-06-12 VITALS
HEIGHT: 74 IN | SYSTOLIC BLOOD PRESSURE: 112 MMHG | WEIGHT: 175.4 LBS | OXYGEN SATURATION: 95 % | RESPIRATION RATE: 16 BRPM | BODY MASS INDEX: 22.51 KG/M2 | HEART RATE: 106 BPM | DIASTOLIC BLOOD PRESSURE: 66 MMHG | TEMPERATURE: 98.2 F

## 2020-06-12 PROBLEM — S22.42XD CLOSED FRACTURE OF MULTIPLE RIBS OF LEFT SIDE WITH ROUTINE HEALING: Status: ACTIVE | Noted: 2020-06-12

## 2020-06-12 PROCEDURE — 97165 OT EVAL LOW COMPLEX 30 MIN: CPT | Mod: GO | Performed by: REHABILITATION PRACTITIONER

## 2020-06-12 PROCEDURE — 25000132 ZZH RX MED GY IP 250 OP 250 PS 637: Mod: GY | Performed by: SURGERY

## 2020-06-12 PROCEDURE — 25000132 ZZH RX MED GY IP 250 OP 250 PS 637: Mod: GY | Performed by: PHYSICIAN ASSISTANT

## 2020-06-12 PROCEDURE — 94640 AIRWAY INHALATION TREATMENT: CPT

## 2020-06-12 PROCEDURE — 99207 ZZC CDG-CODE CATEGORY CHANGED: CPT | Performed by: INTERNAL MEDICINE

## 2020-06-12 PROCEDURE — 40000275 ZZH STATISTIC RCP TIME EA 10 MIN

## 2020-06-12 PROCEDURE — 99231 SBSQ HOSP IP/OBS SF/LOW 25: CPT | Performed by: INTERNAL MEDICINE

## 2020-06-12 PROCEDURE — 25000132 ZZH RX MED GY IP 250 OP 250 PS 637: Mod: GY | Performed by: INTERNAL MEDICINE

## 2020-06-12 RX ORDER — ACETAMINOPHEN 500 MG
1000 TABLET ORAL EVERY 8 HOURS
Qty: 100 TABLET | Refills: 0 | Status: SHIPPED | OUTPATIENT
Start: 2020-06-12 | End: 2021-09-03

## 2020-06-12 RX ORDER — TRAMADOL HYDROCHLORIDE 50 MG/1
25 TABLET ORAL EVERY 6 HOURS PRN
Qty: 15 TABLET | Refills: 0 | Status: SHIPPED | OUTPATIENT
Start: 2020-06-12 | End: 2020-07-01

## 2020-06-12 RX ORDER — DOCUSATE SODIUM 100 MG/1
100 CAPSULE, LIQUID FILLED ORAL EVERY EVENING
Status: DISCONTINUED | OUTPATIENT
Start: 2020-06-12 | End: 2020-06-12 | Stop reason: HOSPADM

## 2020-06-12 RX ORDER — LIDOCAINE 4 G/G
1 PATCH TOPICAL EVERY 24 HOURS
Qty: 14 PATCH | Refills: 3 | Status: SHIPPED | OUTPATIENT
Start: 2020-06-12 | End: 2020-07-01

## 2020-06-12 RX ORDER — FAMOTIDINE 10 MG
10 TABLET ORAL EVERY EVENING
Status: DISCONTINUED | OUTPATIENT
Start: 2020-06-12 | End: 2020-06-12 | Stop reason: HOSPADM

## 2020-06-12 RX ORDER — ASPIRIN 81 MG/1
81 TABLET ORAL DAILY
Status: DISCONTINUED | OUTPATIENT
Start: 2020-06-12 | End: 2020-06-12 | Stop reason: HOSPADM

## 2020-06-12 RX ORDER — LIDOCAINE 4 G/G
1 PATCH TOPICAL
Status: DISCONTINUED | OUTPATIENT
Start: 2020-06-12 | End: 2020-06-12 | Stop reason: HOSPADM

## 2020-06-12 RX ORDER — ATORVASTATIN CALCIUM 40 MG/1
40 TABLET, FILM COATED ORAL AT BEDTIME
Status: DISCONTINUED | OUTPATIENT
Start: 2020-06-12 | End: 2020-06-12 | Stop reason: HOSPADM

## 2020-06-12 RX ADMIN — DICLOFENAC 2 G: 10 GEL TOPICAL at 15:50

## 2020-06-12 RX ADMIN — SENNOSIDES, DOCUSATE SODIUM 2 TABLET: 50; 8.6 TABLET, FILM COATED ORAL at 08:18

## 2020-06-12 RX ADMIN — ACETAMINOPHEN 975 MG: 325 TABLET, FILM COATED ORAL at 12:46

## 2020-06-12 RX ADMIN — ACETAMINOPHEN 975 MG: 325 TABLET, FILM COATED ORAL at 05:42

## 2020-06-12 RX ADMIN — METOPROLOL SUCCINATE 50 MG: 50 TABLET, EXTENDED RELEASE ORAL at 08:15

## 2020-06-12 RX ADMIN — ASPIRIN 81 MG: 81 TABLET, COATED ORAL at 12:45

## 2020-06-12 ASSESSMENT — ACTIVITIES OF DAILY LIVING (ADL)
ADLS_ACUITY_SCORE: 10.5
ADLS_ACUITY_SCORE: 10
ADLS_ACUITY_SCORE: 10.5

## 2020-06-12 NOTE — PROGRESS NOTES
[facility]    General Surgery Progress Note 6/11/2020         Assessment and Plan:   Assessment:    William Ba is a 85 year old male admitted to trauma service for fall from standing, hitting left side on a coffee table.    - Left 8th rib fracture   - Small left pneumothorax, stable and no chest tube needed   - Saturating normally on room - monitor   - No other injures by imaging or exam (CT head, chest, abd/pelvis)  -6/11 COVID negative      Plan:   -Thoracic surgery involved; plan outpt f/u with PCP in 2 weeks and repeat chest films  -Pulmonary toilet - IS instruction and teaching  -Minimal pain, acetaminophen prn  -Bowel program  -SW, PT, OT involved; recommendation for home with assist and PT/OT for safety eval and caregiver education  -Hospitalist following appreciate assistance  -Discharge when support/therapy plans in place, likely tomorrow.     9477 addendum: topical diclofenac gel ordered for use at rib fracture site (daytime) and lidoderm patch (nighttime).  Lizzie Kc PA-C        Interval History:   Sitter at bedside as pt is impulsive and at times disorientated/confused.    Admits to soreness at left chest and occasional bilateral cramping in his calves.  Eating well per patient.  Eager to be discharged.           Physical Exam:   Temp: 98.8  F (37.1  C) Temp src: Temporal BP: 130/88 Pulse: 98 Heart Rate: 102 Resp: 16   SpO2: 98 % O2 Device: None (Room air)        I/O last 3 completed shifts:  In: 850 [P.O.:850]  Out: 475 [Urine:475]    Constitutional: mild confusion, no distress   Chest: mild tenderness to palpation lateral left side.  No ecchymosis.  Non labored breathing.  LE: non-tender, no masses/cording          Data:   Data   Recent Labs   Lab 06/11/20  0901 06/10/20  2256   WBC 8.2 8.7   HGB 12.6* 12.7*   MCV 98 99   * 138*   INR  --  2.68*    141   POTASSIUM 3.9 4.0   CHLORIDE 108 111*   CO2 23 27   BUN 19 25   CR 0.67 0.83   ANIONGAP 9 3   NIEVES 8.0* 8.4*    GLC 90 87     EXAM: CT HEAD W/O CONTRAST  DATE/TIME: 6/10/2020 11:13 PM  IMPRESSION:  1.  No CT evidence for acute intracranial process.  2.  Brain atrophy and presumed chronic microvascular ischemic changes.    EXAM: CT CHEST W/O CONTRAST  DATE/TIME: 6/11/2020 12:12 AM  1.  Small left hemopneumothorax, not significantly changed in size in comparison with study dated 6/10/2020.  2.  Nondisplaced fracture of the lateral left eighth rib.    EXAM: CT ABDOMEN PELVIS W CONTRAST  DATE/TIME: 6/10/2020 11:12 PM  1.  Anterior left basilar pneumothorax, incompletely evaluated. A complete CT chest exam is recommended for further evaluation.  2.  Small left pleural effusion.  3.  No evidence for traumatic injury within the abdomen or pelvis.  4.  3.1 x 2.9 cm abdominal aortic aneurysm.    CHEST TWO VIEWS June 11, 2020 11:42 AM   Faintly demonstrated left lateral base trace pneumothorax is likely stable. This is difficult to visualize. No clear enlargement. Left base atelectasis again noted and trace left pleural fluid as well. Stable cardiac silhouette.     Lizzie Kc PA-C

## 2020-06-12 NOTE — DISCHARGE SUMMARY
"Discharge Summary    William Ba MRN# [id number]   YOB: 1935 Age: 85 year old     Date of Admission:  6/10/2020  Date of Discharge:  6/12/2020  Admitting Physician:  Cathy Mathew MD  Discharge Physician:  Nimesh Montiel MD  Discharging Service:  Hospitalist       Primary Provider: Adryan James          Discharge Diagnosis:   1.  Fall, resulting in nondisplaced fracture of eighth rib fracture and small left hemopneumothorax.    2.  Fracture of left 8th rib.    3.  Small left hemopneumothorax.  No chest tube needed.      4.  Paroxysmal atrial fibrillation.     5.  Coronary artery disease.    6.  Abdominal aortic aneurysm.     7.  COPD, without acute exacerbation.       8.  Cognitive Impairment.     9.  Testing for COVID-19 was negative.              Discharge Disposition:   Discharged to home           Allergies:   No Known Allergies             Condition on Discharge:   Discharge condition: Stable   Discharge vitals: Blood pressure 130/88, pulse 98, temperature 98.8  F (37.1  C), temperature source Temporal, resp. rate 16, height 1.88 m (6' 2\"), weight 79.6 kg (175 lb 6.4 oz), SpO2 98 %.   Code status on discharge: Full Code   Physical exam on day of discharge:   GENERAL:  Comfortable. Cooperative.  PSYCH: pleasant, oriented, No acute distress.  EYES: PERRLA, Normal conjunctiva.  HEART:  Regular rate and rhythm. No JVD. Pulses normal. No edema.  LUNGS:  Clear to auscultation, normal Respiratory effort.  ABDOMEN:  Soft, no hepatosplenomegaly, normal bowel sounds.  EXTREMETIES: No clubbing, cyanosis or ischemia  SKIN:  Dry to touch, No rash.         History of Present Illness and Hospital Course:     See detailed admission note for full details.  William Ba is an 85 year old male with multiple medical problems including dementia, AAA, CVA, atrial fibrillation, and COPD.  He came to the ED on 6/10/2020 after sustaining a fall and hitting the left side of his chest on a coffee table.  " CT of head was negative for intracranial process, however CT of chest revealed fracture of the left eighth rib and small hemopneumothorax.  William was admitted for pain control and close monitoring. He was admitted by Surgery due to the traumatic nature of his injury. He was seen by thoracic surgery for small pneumothorax. It was thought that no chest tube was needed for pneumothorax. Follow up CXR showed stability of hemopneumothorax. Prior to admission Xarelto was initially held but restarted on the day of discharge.  Pain has been adequately controlled. PT and OT were consulted and home PT and OT are recommended. William will discharge home with his daughter who is a nurse.            Procedures / Imaging:   CT of head  CT of chest  CXR           Consultations:   Consultation during this admission received from internal medicine and thoracic surgery             Pending Results:   None           Discharge Instructions and Follow-Up:   Discharge diet: Regular   Discharge activity: Activity as tolerated   Discharge follow-up: Follow up with primary care provider in 2 weeks with CXR   Outpatient therapy: Home PT and OT    Other instructions: None             Discharge Medications:   Current Discharge Medication List      START taking these medications    Details   acetaminophen (TYLENOL) 500 MG tablet Take 2 tablets (1,000 mg) by mouth every 8 hours  Qty: 100 tablet, Refills: 0    Associated Diagnoses: Closed fracture of multiple ribs of left side with routine healing      diclofenac (VOLTAREN) 1 % topical gel Place 2 g onto the skin 4 times daily  Qty: 100 g, Refills: 3    Associated Diagnoses: Closed fracture of multiple ribs of left side with routine healing      Lidocaine (LIDOCARE) 4 % Patch Place 1 patch onto the skin every 24 hours To prevent lidocaine toxicity, patient should be patch free for 12 hrs daily.  Qty: 14 patch, Refills: 3    Associated Diagnoses: Closed fracture of multiple ribs of left side with  routine healing      traMADol (ULTRAM) 50 MG tablet Take 0.5 tablets (25 mg) by mouth every 6 hours as needed for moderate pain  Qty: 15 tablet, Refills: 0    Associated Diagnoses: Closed fracture of multiple ribs of left side with routine healing         CONTINUE these medications which have NOT CHANGED    Details   aspirin 81 MG EC tablet Take 81 mg by mouth daily       atorvastatin (LIPITOR) 40 MG tablet Take 40 mg by mouth At Bedtime       famotidine (PEPCID) 10 MG tablet Take 10 mg by mouth every evening      metoprolol succinate ER (TOPROL-XL) 50 MG 24 hr tablet Take 1 tablet (50 mg) by mouth daily  Qty: 90 tablet, Refills: 3    Associated Diagnoses: Atrial fibrillation/flutter (H)      Misc Natural Products (OSTEO BI-FLEX ADV TRIPLE ST) TABS Take 1 capsule by mouth 2 times daily       Multiple Vitamins-Minerals (MULTIVITAMIN ADULT PO) Take one(1) tablet daily.      rivaroxaban ANTICOAGULANT (XARELTO) 20 MG TABS tablet Take 20 mg by mouth daily (with dinner)      Saw [place] 160 MG CAPS Take 1 capsule by mouth daily       docusate sodium (COLACE) 100 MG capsule Take 100 mg by mouth every evening       fluticasone-vilanterol (BREO ELLIPTA) 100-25 MCG/INH inhaler Inhale 1 puff into the lungs                Total time spent in face to face contact with the patient and coordinating discharge was:  35 Minutes

## 2020-06-12 NOTE — PLAN OF CARE
OT- OT ordered d/t PT concern regarding cognition. Eval completed. Patient reports living in an apartment with his spouse. Pt reports that the spouse assists at home, but does not clarify with what. It appears that the pt is typically indep with all mobility at baseline. Pt now with 2 falls in past 2 weeks resulting in spinal fracture and rib fracture.    Discharge Planner OT   Patient plan for discharge: not stated  Current status: Patient seated in chair, per PT patient is I with mobility, but have concerns with cognitive status. Completed MoCA cognitive screen with score of 11/30, indicating severe impairments. Patient demonstrating deficits with attention, short-term/delayed recall(0/5) and immediate recall (3/5), insight, abstraction (0/3) and orientation (1/6/). Patient demonstrates significant difficulty with multi step directions, required repeat of directions and still was unable to follow. Patient in unable to perform visuospatial/executive function tasks, including completing clock task. Deficits could  create difficulty with daily tasks follow through, anticipating potential hazards, and difficulty with simple problem solving. Patient requires further follow up.  Barriers to return to prior living situation: significant cognitive deficits, decreased insight, por safety and judgement, falls  Recommendations for discharge: home with 24/7 supervision and A. Highly recommend home OT for CPT testing for cognitive status and PT for home safety assessment and caregiver training.   Rationale for recommendations: patient demonstrating significant cognitive deficits which impair insight, judgement and safety awareness and resulting in increased fall risk. Home OT/PT is recommended to further assess cognitive status, provide caregiver training to spouse and family and further assess patient in his home environment. Patient requires home OT/PT at this time as attending in a clinic setting would be a considerable and  significantly taxing effort, limiting his ability to participate in therapy session. Patient does not have IP OT needs, defer to home setting for further follow up.       Entered by: Zora Steiner 06/12/2020 11:24 AM       Occupational Therapy Discharge Summary    Reason for therapy discharge:    Eval only    Progress towards therapy goal(s). [facility] for goal details.  Goals not written, eval only    Therapy recommendation(s):    Continued therapy is recommended.  Rationale/Recommendations:  see above for details.

## 2020-06-12 NOTE — CONSULTS
"Care Transition Initial Assessment -      Met with: Spoke with eugenia Khan via phone, [phone].    Active Problems:    Pneumothorax, left       DATA  Lives With: facility resident, spouse   Living Arrangements: apartment  Quality of Family Relationships: helpful, involved, supportive  Description of Support System: Supportive, Involved  Who is your support system?: Wife, Children  Support Assessment: Adequate family and caregiver support, Adequate social supports.   Identified issues/concerns regarding health management: Pt admitted 6/10 after a fall, pneumothorax. Pt has been wearing a back brace for a recent L4 compression fracture. Pt and spouse reside at [facility] where they receive housekeeping and  3 meals/day. Pt and spouse were attending an adult day program 3 days/week. Eugenia Khan is a retired nurse and prior to COVID-19 visitor restrictions was going to the facility daily to assist pt and spouse. Prior to pt's hospitalization, she was granted permission as an essential family member to visit [facility] every Saturday for med set-up, and calling pt at 8am and 4pm daily to walk pt through him and spouses medications. Spouse has Alzheimer's and pt has some baseline confusion per daughter, but has a history of having \"hospital delirium\" where he has increased confusion. Per daughter, pt typically ambulates independently without a walker and is independent with ADL's. [facility]      Quality of Family Relationships: helpful, involved, supportive  Transportation Anticipated: family or friend will provide    ASSESSMENT  Cognitive Status:  Awake, disoriented and confused.  Concerns to be addressed: Discharge planning. Discussed PT and OT rec's with daughter via phone. She is currently caring for pt's spouse in her home in [place]. She is planning to have pt discharge to her home at discharge until he can safely return to [facility]. " She is agreeable to [facility] PT and OT at her home for pt upon discharge. Daughters address: [address] [id number]. She declined a need for RN and HHA at this time as she is a retired nurse and able to assist as needed.      PLAN  Patient anticipates discharging to:  Home to daughters house with [facility]-PT and OT. Referral sent via email. Discharge AVS updated. Dtr Erin able to provide transport at discharge. SW will continue to follow as needed.    Dorys Antunez, LSIA   Inpatient Care Coordination  [facility]   [phone]

## 2020-06-12 NOTE — PLAN OF CARE
0130 - Pt had an episode of waking up and unable to express where he wanted to go. Sat at edge of bed, swinging at staff, and hitting them will pillows. Was eventually able to stand pt up with 2 assist and take pt to bathroom. Pt voided and went back to bed with 2 assist. Pt grimacing when trying to position self in bed. Unable to perform an adequate neuro exam d/t agitation and restlessness. Aromatherapy applied. Sitter continues at bedside.     0430 - Pt got up again, not as agitated, to the bathroom with 2 assist and gait belt. Voided and then went back to bed. Pt unable to state where he is or why he is here. Inconsistent with following commands. Sitter continued throughout the rest of shift.     Pt up with 2 assist and gait belt. A&O to self. Pt grimacing while trying to reposition himself in bed and when walking. Tylenol only for pain d/t increased confusion from narcotics. LS diminished with audible wheezing. Encourage splinting with a pillow to help when coughing while present in the room. Remote tele monitoring - A-fib hx. Thoracic signed off and pt to f/u with PCP in 2 weeks for a CXR. Sitter at bedside throughout shift.

## 2020-06-12 NOTE — PLAN OF CARE
Pt has discharge orders. This staff read all of the discharge instructions to pts dgt Erin via the phone. Also gave her the phone number to call with any questions. Pt sent home with his dentures, cell phone,  and his own home medication inhaler. Pt assisted to his dgts car and this staff went with to have the dgt sign for the prescriptions sent home.

## 2020-06-12 NOTE — PLAN OF CARE
Paged DR Montiel at 0800 with this text  per tele tech pt is in Afib/Aflutter RVR rate 120-130. pt aymptomatic. sitting on edge of bed and refusing his am inhaler.

## 2020-06-12 NOTE — PLAN OF CARE
RN from [id number]:    Pt disorientated to place, time, and situation. Sitter @ bedside. Calm & cooperative. VS stable; afebrile. Lung sounds: diminished. Pt having a hard time clearing lungs because of his pain-MD lam and PO mucinex given. PO ultram and scheduled tylenol managing pain. CMS intact. Up w/ A1, using gait belt, and brace. Voiding in good amts. Tolerating regular diet. Will continue to monitor.

## 2020-06-12 NOTE — PLAN OF CARE
Confused, oriented self only, sitter at bedside.  Remote tele.  Interm. L upper chest pain managed with scheduled tylenol, cold pack & repositioning.  No nausea.  LS diminished bilaterally, RA, denies any SOB - encouraged hourly CDB/IS.  Denies N/T.  L knee abrasion bandaid CDI.  Voiding.  SBA + belt, ambulating hallway.

## 2020-06-12 NOTE — PROGRESS NOTES
"SPIRITUAL HEALTH SERVICES Progress Note  [facility] Ortho/Spine Unit    Saw pt Zackary per an admission request.  Zackary presented as A&O but his narrative was occasionally vague or confused.  He welcomed a conversation with a .  Zackary described the events that led up to his fall, without using the word fall.  He expressed some frustration about his hospitalization but indicated that he starting to accept the situation.  Zackary described himself as \"athletic\" and shared that he once played football, basketball, and ran track.  He named his spouse and three children as being central to his support system.  His daughter lives in the area and his two sons live in [place].  Zackary is [employer] and attends services at a [employer] but he could not remember the name of the [employer].  He welcomed prayer.  Afterwards, informed him how he can request further  support.    This author and other chaplains remain available per pt/family request or need.    Farhan Monsalve M.Div., [facility]  Staff   Pager [phone]    "

## 2020-06-12 NOTE — PROVIDER NOTIFICATION
wbrigoberto norris can pt have PT/OT home orders? if so pt can discharge home today with dtr if that's ok

## 2020-06-12 NOTE — DISCHARGE INSTRUCTIONS
Your home care referral was sent to [facility]  If you haven't heard from them within the next 24-48 hours,  Please call them at [phone].

## 2020-06-12 NOTE — PROGRESS NOTES
Discharge Planner   Discharge Plans in progress: Dtr Erin's house in [place] with [facility].  Barriers to discharge plan: None anticipated.   Follow up plan: Dtr Erin to transport at 1700 at front entrance. Referral made to [facility] for PT/OT. Discharge Discharge AVS updated. SW will remain available as needed.        Entered by: Dorys Antunez 06/12/2020 2:07 PM       LISA Barba   Inpatient Care Coordination  [facility]   [phone]

## 2020-06-12 NOTE — PROGRESS NOTES
06/11/20 1442   Quick Adds   Type of Visit Initial Occupational Therapy Evaluation   Living Environment   Lives With spouse   Living Arrangements apartment   Home Accessibility no concerns   Transportation Anticipated family or friend will provide   Self-Care   Usual Activity Tolerance good   Current Activity Tolerance good   Functional Level   Prior Functional Level Comment patient is poor historian, unclear as to what ADL baseline is    General Information   Onset of Illness/Injury or Date of Surgery - Date 06/10/20   Referring Physician Dr. Montiel   Patient/Family Goals Statement not stated   Additional Occupational Profile Info/Pertinent History of Current Problem William Ba is a 85 year old male with a history of paroxysmal atrial fibrillation/flutter, CAD,  COPD, and abdominal aortic aneurysm, remote prostate cancer, who was admitted to the trauma service after a mechanical fall in his home over a coffee table   Precautions/Limitations fall precautions  (has )   General Observations patient was seated in chair, agreeable to OT session.    Cognitive Status Examination   Orientation orientation to person, place and time   Level of Consciousness alert   Follows Commands (Cognition) WNL   Pain Assessment   Patient Currently in Pain No   Posture   Posture not impaired   Range of Motion (ROM)   ROM Quick Adds No deficits were identified   Strength   Strength Comments appears intact   Hand Strength   Hand Strength Comments intact   Muscle Tone Assessment   Muscle Tone Quick Adds No deficits were identified   Coordination   Upper Extremity Coordination No deficits were identified   Transfer Skills   Transfer Comments defer to PT for mobility assessment, not completed with OT   Activities of Daily Living Analysis   Impairments Contributing to Impaired Activities of Daily Living cognition impaired   Clinical Impression   Criteria for Skilled Therapeutic Interventions Met evaluation only   OT  "Diagnosis decreased ADLs   Influenced by the following impairments cognition impaired   Assessment of Occupational Performance 3-5 Performance Deficits   Identified Performance Deficits decreased ADLs/IADls- dsg, toileting, bathing, asfe functional mobility   Clinical Decision Making (Complexity) Low complexity   Predicted Duration of Therapy Intervention (days/wks) 1 time session   Anticipated Discharge Disposition Home with Home Therapy   Risks and Benefits of Treatment have been explained. Yes   Patient, Family & other staff in agreement with plan of care Yes   [facility] TM \"6 Clicks\"   2016, Trustees of [facility], under license to [employer].  All rights reserved.   6 Clicks Short Forms Daily Activity Inpatient Short Form   [facility]  \"6 Clicks\" Daily Activity Inpatient Short Form   1. Putting on and taking off regular lower body clothing? 3 - A Little   2. Bathing (including washing, rinsing, drying)? 3 - A Little   3. Toileting, which includes using toilet, bedpan or urinal? 3 - A Little   4. Putting on and taking off regular upper body clothing? 3 - A Little   5. Taking care of personal grooming such as brushing teeth? 3 - A Little   6. Eating meals? 4 - None   Daily Activity Raw Score (Score out of 24.Lower scores equate to lower levels of function) 19   Total Evaluation Time   Total Evaluation Time (Minutes) 25     "

## 2020-06-13 ENCOUNTER — APPOINTMENT (OUTPATIENT)
Dept: CT IMAGING | Facility: CLINIC | Age: 85
DRG: 177 | End: 2020-06-13
Attending: EMERGENCY MEDICINE
Payer: MEDICARE

## 2020-06-13 ENCOUNTER — HOSPITAL ENCOUNTER (INPATIENT)
Facility: CLINIC | Age: 85
LOS: 3 days | Discharge: HOME HEALTH | DRG: 177 | End: 2020-06-17
Attending: EMERGENCY MEDICINE | Admitting: INTERNAL MEDICINE
Payer: MEDICARE

## 2020-06-13 DIAGNOSIS — J18.9 PNEUMONIA OF RIGHT LUNG DUE TO INFECTIOUS ORGANISM, UNSPECIFIED PART OF LUNG: ICD-10-CM

## 2020-06-13 DIAGNOSIS — J96.01 ACUTE RESPIRATORY FAILURE WITH HYPOXIA (H): ICD-10-CM

## 2020-06-13 DIAGNOSIS — J93.9 PNEUMOTHORAX, LEFT: Primary | ICD-10-CM

## 2020-06-13 LAB
ANION GAP SERPL CALCULATED.3IONS-SCNC: 6 MMOL/L (ref 3–14)
BASOPHILS # BLD AUTO: 0 10E9/L (ref 0–0.2)
BASOPHILS NFR BLD AUTO: 0.1 %
BUN SERPL-MCNC: 19 MG/DL (ref 7–30)
CALCIUM SERPL-MCNC: 8.3 MG/DL (ref 8.5–10.1)
CHLORIDE SERPL-SCNC: 104 MMOL/L (ref 94–109)
CO2 SERPL-SCNC: 26 MMOL/L (ref 20–32)
CREAT SERPL-MCNC: 0.77 MG/DL (ref 0.66–1.25)
DIFFERENTIAL METHOD BLD: ABNORMAL
EOSINOPHIL # BLD AUTO: 0 10E9/L (ref 0–0.7)
EOSINOPHIL NFR BLD AUTO: 0.3 %
ERYTHROCYTE [DISTWIDTH] IN BLOOD BY AUTOMATED COUNT: 14.9 % (ref 10–15)
GFR SERPL CREATININE-BSD FRML MDRD: 83 ML/MIN/{1.73_M2}
GLUCOSE SERPL-MCNC: 101 MG/DL (ref 70–99)
HCT VFR BLD AUTO: 41.9 % (ref 40–53)
HGB BLD-MCNC: 13.2 G/DL (ref 13.3–17.7)
IMM GRANULOCYTES # BLD: 0.1 10E9/L (ref 0–0.4)
IMM GRANULOCYTES NFR BLD: 0.5 %
LACTATE BLD-SCNC: 1.4 MMOL/L (ref 0.7–2)
LYMPHOCYTES # BLD AUTO: 2.6 10E9/L (ref 0.8–5.3)
LYMPHOCYTES NFR BLD AUTO: 16 %
MCH RBC QN AUTO: 30.9 PG (ref 26.5–33)
MCHC RBC AUTO-ENTMCNC: 31.5 G/DL (ref 31.5–36.5)
MCV RBC AUTO: 98 FL (ref 78–100)
MONOCYTES # BLD AUTO: 1.3 10E9/L (ref 0–1.3)
MONOCYTES NFR BLD AUTO: 8.4 %
NEUTROPHILS # BLD AUTO: 11.9 10E9/L (ref 1.6–8.3)
NEUTROPHILS NFR BLD AUTO: 74.7 %
NRBC # BLD AUTO: 0 10*3/UL
NRBC BLD AUTO-RTO: 0 /100
PLATELET # BLD AUTO: 140 10E9/L (ref 150–450)
POTASSIUM SERPL-SCNC: 3.6 MMOL/L (ref 3.4–5.3)
RBC # BLD AUTO: 4.27 10E12/L (ref 4.4–5.9)
SODIUM SERPL-SCNC: 136 MMOL/L (ref 133–144)
TROPONIN I SERPL-MCNC: <0.015 UG/L (ref 0–0.04)
WBC # BLD AUTO: 15.9 10E9/L (ref 4–11)

## 2020-06-13 PROCEDURE — 87040 BLOOD CULTURE FOR BACTERIA: CPT | Performed by: EMERGENCY MEDICINE

## 2020-06-13 PROCEDURE — 84484 ASSAY OF TROPONIN QUANT: CPT | Performed by: EMERGENCY MEDICINE

## 2020-06-13 PROCEDURE — 99291 CRITICAL CARE FIRST HOUR: CPT | Mod: 25

## 2020-06-13 PROCEDURE — 85025 COMPLETE CBC W/AUTO DIFF WBC: CPT | Performed by: EMERGENCY MEDICINE

## 2020-06-13 PROCEDURE — 71275 CT ANGIOGRAPHY CHEST: CPT

## 2020-06-13 PROCEDURE — 93005 ELECTROCARDIOGRAM TRACING: CPT

## 2020-06-13 PROCEDURE — 25000125 ZZHC RX 250: Performed by: EMERGENCY MEDICINE

## 2020-06-13 PROCEDURE — 80048 BASIC METABOLIC PNL TOTAL CA: CPT | Performed by: EMERGENCY MEDICINE

## 2020-06-13 PROCEDURE — 25000128 H RX IP 250 OP 636: Performed by: EMERGENCY MEDICINE

## 2020-06-13 PROCEDURE — 83605 ASSAY OF LACTIC ACID: CPT | Performed by: EMERGENCY MEDICINE

## 2020-06-13 RX ORDER — IOPAMIDOL 755 MG/ML
500 INJECTION, SOLUTION INTRAVASCULAR ONCE
Status: COMPLETED | OUTPATIENT
Start: 2020-06-13 | End: 2020-06-13

## 2020-06-13 RX ADMIN — SODIUM CHLORIDE 81 ML: 9 INJECTION, SOLUTION INTRAVENOUS at 23:48

## 2020-06-13 RX ADMIN — IOPAMIDOL 61 ML: 755 INJECTION, SOLUTION INTRAVENOUS at 23:48

## 2020-06-14 PROBLEM — J18.9 COMMUNITY ACQUIRED PNEUMONIA: Status: ACTIVE | Noted: 2020-06-14

## 2020-06-14 LAB
INTERPRETATION ECG - MUSE: NORMAL
SARS-COV-2 PCR COMMENT: NORMAL
SARS-COV-2 RNA SPEC QL NAA+PROBE: NEGATIVE
SARS-COV-2 RNA SPEC QL NAA+PROBE: NORMAL
SPECIMEN SOURCE: NORMAL
SPECIMEN SOURCE: NORMAL

## 2020-06-14 PROCEDURE — 25000128 H RX IP 250 OP 636: Performed by: HOSPITALIST

## 2020-06-14 PROCEDURE — 25800030 ZZH RX IP 258 OP 636: Performed by: INTERNAL MEDICINE

## 2020-06-14 PROCEDURE — 36415 COLL VENOUS BLD VENIPUNCTURE: CPT | Performed by: EMERGENCY MEDICINE

## 2020-06-14 PROCEDURE — C9803 HOPD COVID-19 SPEC COLLECT: HCPCS

## 2020-06-14 PROCEDURE — 25000128 H RX IP 250 OP 636: Performed by: EMERGENCY MEDICINE

## 2020-06-14 PROCEDURE — 40000893 ZZH STATISTIC PT IP EVAL DEFER: Performed by: PHYSICAL THERAPIST

## 2020-06-14 PROCEDURE — 25000132 ZZH RX MED GY IP 250 OP 250 PS 637: Mod: GY | Performed by: INTERNAL MEDICINE

## 2020-06-14 PROCEDURE — 25000128 H RX IP 250 OP 636: Performed by: INTERNAL MEDICINE

## 2020-06-14 PROCEDURE — 99223 1ST HOSP IP/OBS HIGH 75: CPT | Mod: AI | Performed by: INTERNAL MEDICINE

## 2020-06-14 PROCEDURE — 12000000 ZZH R&B MED SURG/OB

## 2020-06-14 PROCEDURE — 96361 HYDRATE IV INFUSION ADD-ON: CPT

## 2020-06-14 PROCEDURE — 96374 THER/PROPH/DIAG INJ IV PUSH: CPT

## 2020-06-14 PROCEDURE — U0003 INFECTIOUS AGENT DETECTION BY NUCLEIC ACID (DNA OR RNA); SEVERE ACUTE RESPIRATORY SYNDROME CORONAVIRUS 2 (SARS-COV-2) (CORONAVIRUS DISEASE [COVID-19]), AMPLIFIED PROBE TECHNIQUE, MAKING USE OF HIGH THROUGHPUT TECHNOLOGIES AS DESCRIBED BY CMS-2020-01-R: HCPCS | Performed by: EMERGENCY MEDICINE

## 2020-06-14 PROCEDURE — 25000132 ZZH RX MED GY IP 250 OP 250 PS 637: Mod: GY | Performed by: HOSPITALIST

## 2020-06-14 PROCEDURE — 25800030 ZZH RX IP 258 OP 636: Performed by: EMERGENCY MEDICINE

## 2020-06-14 PROCEDURE — 87040 BLOOD CULTURE FOR BACTERIA: CPT | Performed by: EMERGENCY MEDICINE

## 2020-06-14 RX ORDER — DOCUSATE SODIUM 100 MG/1
100 CAPSULE, LIQUID FILLED ORAL EVERY EVENING
Status: DISCONTINUED | OUTPATIENT
Start: 2020-06-14 | End: 2020-06-17 | Stop reason: HOSPADM

## 2020-06-14 RX ORDER — METOPROLOL SUCCINATE 50 MG/1
50 TABLET, EXTENDED RELEASE ORAL DAILY
Status: DISCONTINUED | OUTPATIENT
Start: 2020-06-14 | End: 2020-06-17 | Stop reason: HOSPADM

## 2020-06-14 RX ORDER — NALOXONE HYDROCHLORIDE 0.4 MG/ML
.1-.4 INJECTION, SOLUTION INTRAMUSCULAR; INTRAVENOUS; SUBCUTANEOUS
Status: DISCONTINUED | OUTPATIENT
Start: 2020-06-14 | End: 2020-06-17 | Stop reason: HOSPADM

## 2020-06-14 RX ORDER — LIDOCAINE 4 G/G
1 PATCH TOPICAL EVERY 24 HOURS
Status: DISCONTINUED | OUTPATIENT
Start: 2020-06-14 | End: 2020-06-17 | Stop reason: HOSPADM

## 2020-06-14 RX ORDER — ACETAMINOPHEN 500 MG
1000 TABLET ORAL EVERY 8 HOURS
Status: DISCONTINUED | OUTPATIENT
Start: 2020-06-14 | End: 2020-06-17 | Stop reason: HOSPADM

## 2020-06-14 RX ORDER — FAMOTIDINE 10 MG
10 TABLET ORAL EVERY EVENING
Status: DISCONTINUED | OUTPATIENT
Start: 2020-06-14 | End: 2020-06-17 | Stop reason: HOSPADM

## 2020-06-14 RX ORDER — ASPIRIN 81 MG/1
81 TABLET ORAL DAILY
Status: DISCONTINUED | OUTPATIENT
Start: 2020-06-14 | End: 2020-06-17 | Stop reason: HOSPADM

## 2020-06-14 RX ORDER — ATORVASTATIN CALCIUM 40 MG/1
40 TABLET, FILM COATED ORAL AT BEDTIME
Status: DISCONTINUED | OUTPATIENT
Start: 2020-06-14 | End: 2020-06-17 | Stop reason: HOSPADM

## 2020-06-14 RX ORDER — LIDOCAINE 40 MG/G
CREAM TOPICAL
Status: DISCONTINUED | OUTPATIENT
Start: 2020-06-14 | End: 2020-06-17 | Stop reason: HOSPADM

## 2020-06-14 RX ORDER — CEFTRIAXONE 1 G/1
1 INJECTION, POWDER, FOR SOLUTION INTRAMUSCULAR; INTRAVENOUS EVERY 24 HOURS
Status: DISCONTINUED | OUTPATIENT
Start: 2020-06-14 | End: 2020-06-14

## 2020-06-14 RX ADMIN — TAZOBACTAM SODIUM AND PIPERACILLIN SODIUM 3.38 G: 375; 3 INJECTION, SOLUTION INTRAVENOUS at 19:21

## 2020-06-14 RX ADMIN — AZITHROMYCIN DIHYDRATE 500 MG: 500 INJECTION, POWDER, LYOPHILIZED, FOR SOLUTION INTRAVENOUS at 04:02

## 2020-06-14 RX ADMIN — DICLOFENAC SODIUM 2 G: 10 GEL TOPICAL at 13:52

## 2020-06-14 RX ADMIN — TAZOBACTAM SODIUM AND PIPERACILLIN SODIUM 3.38 G: 375; 3 INJECTION, SOLUTION INTRAVENOUS at 13:51

## 2020-06-14 RX ADMIN — TRAMADOL HYDROCHLORIDE 25 MG: 50 TABLET, FILM COATED ORAL at 09:10

## 2020-06-14 RX ADMIN — TRAMADOL HYDROCHLORIDE 25 MG: 50 TABLET, FILM COATED ORAL at 16:35

## 2020-06-14 RX ADMIN — CEFTRIAXONE 1 G: 1 INJECTION, POWDER, FOR SOLUTION INTRAMUSCULAR; INTRAVENOUS at 03:07

## 2020-06-14 RX ADMIN — RIVAROXABAN 20 MG: 20 TABLET, FILM COATED ORAL at 17:57

## 2020-06-14 RX ADMIN — ATORVASTATIN CALCIUM 40 MG: 40 TABLET, FILM COATED ORAL at 20:40

## 2020-06-14 RX ADMIN — DOCUSATE SODIUM 100 MG: 100 CAPSULE, LIQUID FILLED ORAL at 19:21

## 2020-06-14 RX ADMIN — DICLOFENAC SODIUM 2 G: 10 GEL TOPICAL at 16:36

## 2020-06-14 RX ADMIN — ATORVASTATIN CALCIUM 40 MG: 40 TABLET, FILM COATED ORAL at 03:08

## 2020-06-14 RX ADMIN — FLUTICASONE FUROATE AND VILANTEROL TRIFENATATE 1 PUFF: 100; 25 POWDER RESPIRATORY (INHALATION) at 09:09

## 2020-06-14 RX ADMIN — SODIUM CHLORIDE: 9 INJECTION, SOLUTION INTRAVENOUS at 00:33

## 2020-06-14 RX ADMIN — ASPIRIN 81 MG: 81 TABLET, COATED ORAL at 09:10

## 2020-06-14 RX ADMIN — FAMOTIDINE 10 MG: 10 TABLET ORAL at 19:21

## 2020-06-14 RX ADMIN — ACETAMINOPHEN 1000 MG: 500 TABLET, FILM COATED ORAL at 17:57

## 2020-06-14 RX ADMIN — LIDOCAINE 1 PATCH: 560 PATCH PERCUTANEOUS; TOPICAL; TRANSDERMAL at 19:21

## 2020-06-14 RX ADMIN — TAZOBACTAM SODIUM AND PIPERACILLIN SODIUM 4.5 G: 500; 4 INJECTION, SOLUTION INTRAVENOUS at 02:13

## 2020-06-14 RX ADMIN — ACETAMINOPHEN 1000 MG: 500 TABLET, FILM COATED ORAL at 10:44

## 2020-06-14 RX ADMIN — METOPROLOL SUCCINATE 50 MG: 50 TABLET, EXTENDED RELEASE ORAL at 09:10

## 2020-06-14 RX ADMIN — ACETAMINOPHEN 1000 MG: 500 TABLET, FILM COATED ORAL at 03:08

## 2020-06-14 ASSESSMENT — ACTIVITIES OF DAILY LIVING (ADL)
ADLS_ACUITY_SCORE: 17
ADLS_ACUITY_SCORE: 15
ADLS_ACUITY_SCORE: 17

## 2020-06-14 ASSESSMENT — MIFFLIN-ST. JEOR: SCORE: 1553.54

## 2020-06-14 NOTE — ED TRIAGE NOTES
Pt had a fall on Wednesday and found a small pneumothorax.  He was hospitalized and discharged last evening.  Pt now having very congested cough.  He has hx of COPD.  Cough is worse today.  He began expectorating thick phlegm.  He had fever this evening to 100.9

## 2020-06-14 NOTE — PLAN OF CARE
DX: PNA  R-Lower  Lung ARF w/hypoxia  Tele: na  A&O disoriented to place, time and situation  Activity: A-1  Diet: Reg  VSS  O2: 94% RA  BG: na  PIV: RA  Pain: denies  GI/: continent  Labs: Covid results pending  Plan: PT, IV Rocephin & Zithromax  Discharge: TBD continue plan of care.    Pt very restless and confused.  Fidgets with pulse ox and iv put on a nono over iv.  Pt is forgetful.  Can be redirected at times but impulsive.  Consistently setting off alarms.

## 2020-06-14 NOTE — PLAN OF CARE
"VSS. Pt sating 95% on 2L NC. Oxygen applied for comfort. Pt uses abdominal muscles for breathing. Dyspneic on exertion. PAINAD scale used for assessing pain level. 6/10 for pain \"my left side hurts\". PO tramadol given for pain as well as scheduled voltaren cream. A/Ox1. LS- coarse/rhonchi. Congested/croupy/ productive cough. Pt encouraged to spit out sputum. Sputum needs collected. Regular diet w/ good appetite. RT PIV saline locked. No No placed over PIV. RT forearm skin tear about quarter sized. Mepilex placed over skin tear. Continent of bowel and bladder. Zosyn/zithro continued for pneumonia. PSC at bedside. Pt cooperative. Bed alarms not on when PSC is in room. Meds given w/ applesauce.   "

## 2020-06-14 NOTE — ED PROVIDER NOTES
History     Chief Complaint:  Fever and Shortness of Breath    The history is provided by the patient and a relative. History limited by: poor historian.      William Ba is a 85 year old male, with history of AAA, CAD, COPD, CAP, CVA, atrial fibrillation/flutter amongst others as noted below, currently anticoagulated on Xarelto, who presents for evaluation of a fever as high as 100.9F with associated productive cough that worsened today. Patient had a recent fall and was evaluated in the ED on 6/10, with the below imaging, and found to have a nondisplaced fracture of his lateral left eight rib with small left hemopneumothorax that did not require chest tube placement. Patient was admitted and discharged last night, but has since developed a fever and productive cough.     Here, patient reports cough has been ongoing for the last week, but denies any leg swelling or abdominal pain. No chest pain    Imaging from ED Visit 6/10/2020:  CT Chest w/o Contrast:  IMPRESSION:   1.  Small left hemopneumothorax, not significantly changed in size in comparison with study dated 6/10/2020.  2.  Nondisplaced fracture of the lateral left eighth rib.    CT Abdomen Pelvis   IMPRESSION:   1.  Anterior left basilar pneumothorax, incompletely evaluated. A complete CT chest exam is recommended for further evaluation.  2.  Small left pleural effusion.  3.  No evidence for traumatic injury within the abdomen or pelvis.  4.  3.1 x 2.9 cm abdominal aortic aneurysm.    CT Head w/o Contrast:  IMPRESSION:  1.  No CT evidence for acute intracranial process.  2.  Brain atrophy and presumed chronic microvascular ischemic changes as above.    XR Chest:  IMPRESSION: Faintly demonstrated left lateral base trace pneumothorax  is likely stable. This is difficult to visualize. No clear  enlargement. Left base atelectasis again noted and trace left pleural  fluid as well. Stable cardiac silhouette.    Allergies:  No Known Drug Allergies      Medications:    Aspirin 81 mg  Lipitor  Pepcid  Breo ellipta  Metoprolol  Xarelto  Tramadol    Past Medical History:    Abdominal aortic aneurysm without rupture  Acute CVA  Atrial fibrillation/flutter  COPD  Coronary atherosclerosis  Mixed hyperlipidemia  CAD  Malignant neoplasm of prostate  Hemopneumothorax  Hiatal hernia  Schatzki's ring  SVT  CAP  GERD    Past Surgical History:    Colonoscopy x3  Total knee replacement - right  EGD x2  Hernia repair - right inguinal    Family History:    Brother - colon cancer  Father - AAA  Mother - heart  Sister - macular degeneration    Social History:  The patient was unaccompanied to the ED.  Smoking Status: Former  Smokeless Tobacco: Never  Alcohol Use: Not currently   Marital Status:   [2]     Review of Systems   Reason unable to perform ROS: poor historian.   Dementia    Physical Exam     Patient Vitals for the past 24 hrs:   BP Temp Temp src Pulse Heart Rate Resp SpO2   06/14/20 0015 (!) 144/87 -- -- 130 131 -- 96 %   06/13/20 2330 133/86 -- -- 131 -- -- 95 %   06/13/20 2315 129/88 -- -- 127 -- -- 91 %   06/13/20 2310 -- -- -- -- -- -- 100 %   06/13/20 2305 -- -- -- -- -- -- 94 %   06/13/20 2300 (!) 133/92 -- -- 114 -- -- 95 %   06/13/20 2255 (!) 132/99 -- -- -- -- -- --   06/13/20 2218 108/67 97.9  F (36.6  C) Temporal -- 134 22 98 %       Physical Exam    Gen: alert  HEENT: PERRL, oropharynx clear  Neck: normal ROM  CV: RRR, no murmurs  Pulm: breath sounds equal, no wheezing, crackles throughout right lung  Abd: Soft, nontender  Back: no evidence of injury, no cva tenderness  MSK: no deformity, moves all extremities  Skin: no rash  Neuro: alert, dementia, answers direct questions but cannot give history      Emergency Department Course     ECG:  Indication: Shortness of Breath  ECG taken at 2336, ECG read at 2340 by Dr. Roberto MD  Accelerated junctional rhythm  Nonspecific ST abnormality  Abnormal ECG  Rate 130 bpm. UT interval *. QRS duration 96. QT/QTc  316/465. P-R-T axes * 39 48.      Imaging:  Radiology findings were communicated with the patient who voiced understanding of the findings.    CT Chest Pulmonary Embolism w Contrast:  IMPRESSION:   1.  Interval development of right lower lobe infiltrate.   2.  Mucous plugging right lung base.   3.  Small left hydropneumothorax is again seen.   4.  Mildly enlarged mediastinal and right hilar nodes could be reactive.   5.  No pulmonary embolism.   Reading per radiology.     Laboratory:  Laboratory findings were communicated with the patient who voiced understanding of the findings.    CBC: WBC 15.9 (H), HGB 13.2 (L),  (L)   BMP: Glucose 101 (H), Calcium 8.3 (L) o/w WNL (Creatinine 0.77)  Lactic Acid (Resulted 2323): 1.4  Troponin (Collected 2307): <0.015   Blood Cultures: Pending x2     Symptomatic COVID-19 (Coronavirus) PCR by Nasopharyngeal Swab: Pending     Interventions:  0033 0.9% NaCl Bolus  mL   Zosyn 4.5 g IV    Emergency Department Course:  Past medical records, nursing notes, and vitals reviewed.    (2242)   I performed an exam of the patient as documented above. History obtained from patient.    EKG obtained in the ED, see results above.     The patient was sent for a CT Chest Pulmonary Embolsim w Contrast while in the emergency department, results above.     IV was inserted and blood was drawn for laboratory testing, results above.    (0115)   Updated by RN that while patient ambulated with steady gait, pulse oximetry revealed SpO2 decreased to 87% and HR remained in the high 120s.     (0123)   I rechecked the patient and discussed the results of his workup thus far. Discussed that admission is indicated.     A nasal swab was obtained for laboratory testing, findings above.      (0135)   I spoke with Dr. Langston of the Hospitalist service regarding patient's presentation, findings, and plan of care, who agrees to accept patient for further care, evaluation and monitoring.        Impression &  Plan   Medical Decision Making:  Patient presents for fever and cough.  Recent fall noted.  Evaluation today showed RLL pneumonia.  Will treat for HCAP due to residence in assisted living and recent hospital admission.  No new or worsening PTX.  Patient initially in aflutter with RVR.  Rate improved with IV fluids.  Hx of COPD but not wheezing here.  No indication for nebs or steroids at this time.  Hypoxic with ambulation.  WIll admit for treatment of PNA.      Covid-19  William Ba was evaluated during a global COVID-19 pandemic, which necessitated consideration that the patient might be at risk for infection with the SARS-CoV-2 virus that causes COVID-19.   Applicable protocols for evaluation were followed during the patient's care.   COVID-19 was considered as part of the patient's evaluation. The plan for testing is:  a test was obtained during this visit.        Diagnosis:    ICD-10-CM    1. Pneumonia of right lung due to infectious organism, unspecified part of lung  J18.9    2. Acute respiratory failure with hypoxia (H)  J96.01        Disposition:  Admitted to medical floor under the care of Dr. Langston.    Scribe Disclosure:  Celina LUGO, am serving as a scribe at 10:40 PM on 6/13/2020 to document services personally performed by Bee Mejia MD based on my observations and the provider's statements to me.   6/13/2020   [facility] EMERGENCY DEPARTMENT       Bee Mejia MD  06/14/20 0138

## 2020-06-14 NOTE — PLAN OF CARE
Report given to JORDIN Salvador on 3rd. Patient transferred. Shaina, daughter, called with updates and new room number.

## 2020-06-14 NOTE — PHARMACY-ADMISSION MEDICATION HISTORY
Patient recently admitted and discharged.  See medication reconciliation note completed on 6/11/2020.    Prior to Admission medications    Medication Sig Last Dose Taking? Auth Provider   acetaminophen (TYLENOL) 500 MG tablet Take 2 tablets (1,000 mg) by mouth every 8 hours  Yes Nimesh Montiel MD   aspirin 81 MG EC tablet Take 81 mg by mouth daily   Yes Reported, Patient   atorvastatin (LIPITOR) 40 MG tablet Take 40 mg by mouth At Bedtime   Yes Reported, Patient   diclofenac (VOLTAREN) 1 % topical gel Place 2 g onto the skin 4 times daily  Yes Nimesh Montiel MD   docusate sodium (COLACE) 100 MG capsule Take 100 mg by mouth every evening   Yes Reported, Patient   famotidine (PEPCID) 10 MG tablet Take 10 mg by mouth every evening  Yes Unknown, Entered By History   fluticasone-vilanterol (BREO ELLIPTA) 100-25 MCG/INH inhaler Inhale 1 puff into the lungs  Yes Reported, Patient   Lidocaine (LIDOCARE) 4 % Patch Place 1 patch onto the skin every 24 hours To prevent lidocaine toxicity, patient should be patch free for 12 hrs daily.  Yes Nimesh Montiel MD   metoprolol succinate ER (TOPROL-XL) 50 MG 24 hr tablet Take 1 tablet (50 mg) by mouth daily  Yes Zora Moreno, APRN CNP   Misc Natural Products (OSTEO BI-FLEX ADV TRIPLE ST) TABS Take 1 capsule by mouth 2 times daily   Yes Reported, Patient   Multiple Vitamins-Minerals (MULTIVITAMIN ADULT PO) Take one(1) tablet daily.  Yes Reported, Patient   rivaroxaban ANTICOAGULANT (XARELTO) 20 MG TABS tablet Take 20 mg by mouth daily (with dinner)  Yes Unknown, Entered By History   Saw [place] 160 MG CAPS Take 1 capsule by mouth daily   Yes Reported, Patient   traMADol (ULTRAM) 50 MG tablet Take 0.5 tablets (25 mg) by mouth every 6 hours as needed for moderate pain  Yes Nimesh Montiel MD

## 2020-06-14 NOTE — PROGRESS NOTES
Brief progress note:    Patient admitted this a.m. by my colleague, Dr. Langston.  Documentation reviewed.      Understand this is an 85-year-old male who was recently admitted to this hospital and discharged on 6/12 after he had a fall and injured his left side.  He was found to have posterior rib fractures and small hemopneumothorax.  He was seen by thoracic surgery and did not require chest tube placement.  He was discharged on 6/12 and comes back with increasing productive cough with a low-grade fever.      Vitals:    06/14/20 0651 06/14/20 0744 06/14/20 0749 06/14/20 0905   BP: (!) 154/82   139/74   BP Location:    Right arm   Pulse: 104      Resp: 20      Temp: 98.4  F (36.9  C)      TempSrc: Oral      SpO2: 94% (!) 86% 96%    Weight:       Height:         My exam, patient is awake and alert x 1.  He is not in acute distress.  He has coarse rhonchi noted bilaterally worse in the bases.  There is no egophony and no wheeze noted.  Heart is irregular without murmur.  Abdomen is soft and nondistended.  Neurologically, patient is oriented to self only.  He does move all extremities.    Assessment/Plan:  #Right lower lobe pneumonia associated with recent rib fracture and small hemopneumothorax.    CT of the chest shows interval development of the right lower lobe infiltrate with mucous plugging.  Small left hydropneumothorax is seen but unchanged.  -Initially started on ceftriaxone and azithromycin.  Transitioning ceftriaxone to Zosyn given recent hospitalization.  -I did resume topical therapies for the rib fracture.  Also has PRN pain medications available.  -COVID-19 negative, discontinue precautions  -Rest of plan per initial H&P.    Jose Silverman MD

## 2020-06-14 NOTE — PLAN OF CARE
Discharge Planner PT   Patient plan for discharge: Home with HHPT/OT  Current status: Orders received, chart reviewed, discussed with RN. Pt was recently evaluated by this writer on his last admission. Pt's mobility status remains unchanged as he is able to ambulate in the hallways well with SBA. No mobility concerns at this time. Will complete IP PT orders and defer any higher level PT needs to next level of care. RN in agreement.   Barriers to return to prior living situation: None anticipated  Recommendations for discharge: Home  Rationale for recommendations: pt is mobilizing at near/baseline mobility. No further IP PT needs identified. HHPT recommended previous admission in order to help identify why pt is falling at home/home safety eval as his mobility appears fine during admission.       Entered by: Lexis Briones 06/14/2020 2:07 PM

## 2020-06-14 NOTE — ED NOTES
Pt ambulated with steady gait with pulse oximetry, his SpO2 decreased to 87%, HR maintained in the high 120s. MD aware. Pt back in bed and SpO2 increased WNL within a minute.

## 2020-06-14 NOTE — PLAN OF CARE
End of Shift Summary  For vital signs and complete assessments, please see documentation flowsheets.     Pertinent assessments: Oriented only to self. Restless and impulsive most of the shift, sitter at bedside. Tramadol given once for L rib pain, which was effective. Lidocaine patches and Voltaren gel added in for pain management.    Major Shift Events: COVID negative, will transfer to 3rd floor.    Treatment Plan: Zithromax, Zosyn, and pain management.    Discharge Readiness: Medically active  Expected Discharge Date: TBD  Discharge Disposition: Home with Self care  Barriers/Criteria for discharge: IV abx

## 2020-06-14 NOTE — H&P
[facility] History and Physical    William Ba MRN# [id number]   Age: 85 year old YOB: 1935     Date of Admission:  6/13/2020    Home clinic: [facility]  Primary care provider: Adryan James          Assessment and Plan:   Assessment:   William Ba is an 85-year-old man who was just admitted to the hospital this past week after a fall in which he injured his left side.  He had sustained a left posterior rib fracture and had a small hemopneumothorax that was evaluated by thoracic surgery.  He did not require a chest tube for that.  Dates of that admission were 6/10 to 6/12/2020; the patient returned to his home with his wife at [facility].      Mr. Ba came to attention tonight due to increasing and productive cough with at least low-grade fever that were identified at home.  He also apparently has been much more somnolent than usual in the last 2 days.    In the emergency department, Mr. Ba is noted to be tachycardic with a variable heart rate but hemodynamically stable.  He briefly dropped his oxygen saturation but that may have been due to evanescent inaccurate reading with his atrial fibrillation.  He was not in any significant distress at the time of my evaluation.  White cell count 15.9 with a left shift, hemoglobin 13.2, platelets 140.  Creatinine 0.77, electrolytes normal, lactic acid 1.4.  2 blood cultures were obtained.  Chest CT does show significant increase in the right basilar infiltrate compared with similar study completed 6/10/2020.    Prior medical history is fortunately relatively benign.  He has paroxysmal atrial fibrillation and dementia.  He is never had a heart attack but has known coronary artery disease.  He was diagnosed with prostate cancer some years ago but it does not appear that he ever had treatment for it.    Dx:  1.  Right lower lobe pneumonia associated with recent rib fracture and small  hemopneumothorax.  Remarkably, I hear more loose rhonchi on the left.  2.  Baseline COPD with emphysema.  3.  Dementia.  Mr. Ba is pleasant and appropriate at the time of my evaluation, but oriented to self only.  His daughter indicates he has a history of agitation/sundowning/hospital related psychosis.  He is able to give remote history but nothing current.  4.  Paroxysmal atrial fibrillation on rivaroxaban.  5.  Coronary artery disease.  6.  Prostate cancer stage IIa diagnosed 2015.      Plan:   1.  Admit to inpatient.  2.  Ceftriaxone and azithromycin.  3.  In the absence of wheeze, the patient has not been put on any steroid.  4.  Mobilize as able to avoid exacerbation of his weakness.  I note patient was getting physical therapy at home.             Chief Complaint:   Cough and fever     History is obtained from the patient's daughter, electronic medical record and emergency room physician.             Past Medical History:     Past Medical History:   Diagnosis Date     Abdominal aortic aneurysm without rupture (H) 2/25/2016     Acute CVA (cerebrovascular accident) (H) 3/28/2019     Atrial fibrillation/flutter      COPD (chronic obstructive pulmonary disease)      Coronary atherosclerosis 1/29/2015     Mixed hyperlipidemia 11/8/2010             Past Surgical History:   Total knee replacement 2014  Status post hernia repair 2018         Social History:   Former smoker.  Quit 2003 after approximately 75 pack years.  Alcohol use is rare.  Currently resides in an assisted living with his wife who has more significant dementia than he does.  Their daughter, Erin, is a retired OB nurse.  She lives locally and is primarily caring for her parents.         Family History:   Reviewed but considered noncontributory to this hospitalization.         Allergies:   No Known Allergies          Medications:     Medications Prior to Admission   Medication Sig Dispense Refill Last Dose     acetaminophen (TYLENOL) 500 MG tablet  Take 2 tablets (1,000 mg) by mouth every 8 hours 100 tablet 0      aspirin 81 MG EC tablet Take 81 mg by mouth daily         atorvastatin (LIPITOR) 40 MG tablet Take 40 mg by mouth At Bedtime         diclofenac (VOLTAREN) 1 % topical gel Place 2 g onto the skin 4 times daily 100 g 3      docusate sodium (COLACE) 100 MG capsule Take 100 mg by mouth every evening         famotidine (PEPCID) 10 MG tablet Take 10 mg by mouth every evening        fluticasone-vilanterol (BREO ELLIPTA) 100-25 MCG/INH inhaler Inhale 1 puff into the lungs        Lidocaine (LIDOCARE) 4 % Patch Place 1 patch onto the skin every 24 hours To prevent lidocaine toxicity, patient should be patch free for 12 hrs daily. 14 patch 3      metoprolol succinate ER (TOPROL-XL) 50 MG 24 hr tablet Take 1 tablet (50 mg) by mouth daily 90 tablet 3      Misc Natural Products (OSTEO BI-FLEX ADV TRIPLE ST) TABS Take 1 capsule by mouth 2 times daily         Multiple Vitamins-Minerals (MULTIVITAMIN ADULT PO) Take one(1) tablet daily.        rivaroxaban ANTICOAGULANT (XARELTO) 20 MG TABS tablet Take 20 mg by mouth daily (with dinner)        Saw [place] 160 MG CAPS Take 1 capsule by mouth daily         traMADol (ULTRAM) 50 MG tablet Take 0.5 tablets (25 mg) by mouth every 6 hours as needed for moderate pain 15 tablet 0              Review of Systems:   Review of systems is limited by patient factors -dementia    The patient's daughter indicates that both Mr. Ba and his wife have declined significantly since the COVID pandemic and isolation is limiting them so much.  He typically walks without a walker and is typically a very active man.  She describes him having had problems during a remote previous hospitalization possibly related to steroids where he became acutely disoriented and required a sitter.          Physical Exam:   Vitals were reviewed  Temp: 97.9  F (36.6  C) Temp src: Oral BP: (!) 150/98 Pulse: 134 Heart Rate: 105 Resp: 22 SpO2: 93 % O2 Device: Nasal  cannula Oxygen Delivery: 1 LPM  Constitutional: Awake, alert, cooperative, no apparent distress, and appears stated age.  Eyes: Lids and lashes normal, extra ocular muscles intact, sclera clear, conjunctiva normal.  Pupils are round with postsurgical (cataract repair) change.  Minimally reactive to light.  ENT: Normocephalic, without obvious abnormality, atraumatic.  Oropharynx is without obvious lesions.  Neck: Supple, symmetrical, trachea midline, no adenopathy, thyroid symmetric, not enlarged and no tenderness, skin normal.  Hematologic / Lymphatic: No cervical lymphadenopathy and no supraclavicular lymphadenopathy.  Back: Symmetric, no curvature, spinous processes are non-tender on palpation, paraspinous muscles are non-tender on palpation, no costal vertebral tenderness.  Lungs: no increased work of breathing, good air exchange, no retractions.  Loose/coarse rhonchi noted particularly on the left, no egophony, no wheeze.  Cardiovascular: Irregularly irregular rate and rhythm without rubs, murmurs or gallops.  Abdomen: No scars, normal bowel sounds, soft, non-distended, non-tender, no masses palpated, no hepatosplenomegaly.  Musculoskeletal: No redness, warmth, or swelling of the joints.  Trace edema.  Tone is normal.  Neurologic: Awake, alert, pleasant and appropriately interactive with me.  He is not oriented to time or place.  Cranial nerves II-XII are grossly intact.    Skin: No rashes, erythema, pallor, petechia or purpura.          Data:     Results for orders placed or performed during the hospital encounter of 06/13/20 (from the past 24 hour(s))   Blood culture    Specimen: Blood    Right Arm   Result Value Ref Range    Specimen Description Blood Right Arm     Culture Micro No growth after 3 hours    CBC + differential   Result Value Ref Range    WBC 15.9 (H) 4.0 - 11.0 10e9/L    RBC Count 4.27 (L) 4.4 - 5.9 10e12/L    Hemoglobin 13.2 (L) 13.3 - 17.7 g/dL    Hematocrit 41.9 40.0 - 53.0 %    MCV 98 78 -  100 fl    MCH 30.9 26.5 - 33.0 pg    MCHC 31.5 31.5 - 36.5 g/dL    RDW 14.9 10.0 - 15.0 %    Platelet Count 140 (L) 150 - 450 10e9/L    Diff Method Automated Method     % Neutrophils 74.7 %    % Lymphocytes 16.0 %    % Monocytes 8.4 %    % Eosinophils 0.3 %    % Basophils 0.1 %    % Immature Granulocytes 0.5 %    Nucleated RBCs 0 0 /100    Absolute Neutrophil 11.9 (H) 1.6 - 8.3 10e9/L    Absolute Lymphocytes 2.6 0.8 - 5.3 10e9/L    Absolute Monocytes 1.3 0.0 - 1.3 10e9/L    Absolute Eosinophils 0.0 0.0 - 0.7 10e9/L    Absolute Basophils 0.0 0.0 - 0.2 10e9/L    Abs Immature Granulocytes 0.1 0 - 0.4 10e9/L    Absolute Nucleated RBC 0.0    Basic metabolic panel (BMP)   Result Value Ref Range    Sodium 136 133 - 144 mmol/L    Potassium 3.6 3.4 - 5.3 mmol/L    Chloride 104 94 - 109 mmol/L    Carbon Dioxide 26 20 - 32 mmol/L    Anion Gap 6 3 - 14 mmol/L    Glucose 101 (H) 70 - 99 mg/dL    Urea Nitrogen 19 7 - 30 mg/dL    Creatinine 0.77 0.66 - 1.25 mg/dL    GFR Estimate 83 >60 mL/min/[1.73_m2]    GFR Estimate If Black >90 >60 mL/min/[1.73_m2]    Calcium 8.3 (L) 8.5 - 10.1 mg/dL   Lactic acid whole blood   Result Value Ref Range    Lactic Acid 1.4 0.7 - 2.0 mmol/L   Troponin I   Result Value Ref Range    Troponin I ES <0.015 0.000 - 0.045 ug/L   EKG 12 lead   Result Value Ref Range    Interpretation ECG Click View Image link to view waveform and result    CT Chest Pulmonary Embolism w Contrast    Narrative    EXAM: CT CHEST PULMONARY EMBOLISM W CONTRAST  LOCATION: [facility]  DATE/TIME: 6/13/2020 11:40 PM    INDICATION: Recent fall with hemopneumothorax. Cough, fever and shortness of breath.  COMPARISON: 06/11/2020  TECHNIQUE: CT chest pulmonary angiogram during arterial phase injection of IV contrast. Multiplanar reformats and MIP reconstructions were performed. Dose reduction techniques were used.   CONTRAST: 61mL Isovue-370    FINDINGS:  ANGIOGRAM CHEST: Pulmonary arteries are normal caliber and negative  for pulmonary emboli. Thoracic aorta is not well opacified and is  indeterminate for dissection. No CT evidence of right heart strain.    LUNGS AND PLEURA: 7 mm nodular opacity right apex unchanged. Calcified granuloma left apex and right lung base. Emphysema. Mucous plugging in the right lung base. Interval development of right basilar infiltrate. Small left hydropneumothorax is again   seen.    MEDIASTINUM/AXILLAE: Atherosclerotic aorta. Prominent right hilar nodes. 1.5 x 1.6 cm node series 5 image 69. Right posterior mediastinal node image 83 1.2 x 2.7 cm. Coronary artery calcification.    UPPER ABDOMEN: Mild distention of the gallbladder.    MUSCULOSKELETAL: Degenerative change osseous structures. Lateral left ribs 7 and 8 rib fractures. Mild mid and lower thoracic compression deformities.      Impression    IMPRESSION:  1.  Interval development of right lower lobe infiltrate.  2.  Mucous plugging right lung base.  3.  Small left hydropneumothorax is again seen.  4.  Mildly enlarged mediastinal and right hilar nodes could be reactive.  5.  No pulmonary embolism.   Symptomatic COVID-19 Virus (Coronavirus) by PCR    Specimen: Nasopharyngeal   Result Value Ref Range    COVID-19 Virus PCR to [facility] - Source Nasopharyngeal     COVID-19 Virus PCR to [facility] - Result       Test received-See reflex to IDDL test SARS CoV2 (COVID-19) Virus RT-PCR   Blood culture    Specimen: Blood    Left Hand   Result Value Ref Range    Specimen Description Blood Left Hand     Special Requests Received in aerobic bottle only     Culture Micro No growth after 1 hour       EKG results:   Performed on admission        Irregularly irregular rate and rhythm intermittently very fast narrow complex compatible with atrial fibrillation      All imaging studies reviewed by me.      Attestation:  I have reviewed today's vital signs, notes, medications, labs and imaging.  Total time: 45 minutes     Atif Langston MD

## 2020-06-14 NOTE — ED NOTES
[facility]  ED Nurse Handoff Report    William Ba is a 85 year old male   ED Chief complaint: Fever and Shortness of Breath  . ED Diagnosis:   Final diagnoses:   Pneumonia of right lung due to infectious organism, unspecified part of lung   Acute respiratory failure with hypoxia (H)     Allergies: No Known Allergies    Code Status: Full Code  Activity level - Baseline/Home:  Independent. Activity Level - Current:   Assist X 1. Lift room needed: No. Bariatric: No   Needed: No   Isolation: No. Infection: Not Applicable.     Vital Signs:   Vitals:    06/14/20 0030 06/14/20 0045 06/14/20 0115 06/14/20 0130   BP: (!) 160/103 (!) 139/92 (!) 144/111 (!) 143/93   Pulse: 98 134 138 107   Resp:       Temp:       TempSrc:       SpO2: (!) 88% 90% 100% 98%       Cardiac Rhythm:  ,      Pain level:    Patient confused: Yes, forgetful. Patient Falls Risk: Yes.   Elimination Status: Has voided   Patient Report - Initial Complaint: fever and shortness of breath  Fever at home as high as 100.9F with associated productive cough that worsened today. Patient had a recent fall and was evaluated in the ED on 6/10 and found to have a nondisplaced fracture of his lateral left eight rib with small left hemopneumothorax that did not require chest tube placement.  Focused Assessment: productive cough  Tests Performed: CT. Labs  Abnormal Results:   Labs Ordered and Resulted from Time of ED Arrival Up to the Time of Departure from the ED   CBC WITH PLATELETS DIFFERENTIAL - Abnormal; Notable for the following components:       Result Value    WBC 15.9 (*)     RBC Count 4.27 (*)     Hemoglobin 13.2 (*)     Platelet Count 140 (*)     Absolute Neutrophil 11.9 (*)     All other components within normal limits   BASIC METABOLIC PANEL - Abnormal; Notable for the following components:    Glucose 101 (*)     Calcium 8.3 (*)     All other components within normal limits   LACTIC ACID WHOLE BLOOD   TROPONIN I   COVID-19 VIRUS  (CORONAVIRUS) BY PCR   ISTAT CREATININE NURSING POCT   TELEMETRY MONITORING CARDIOLOGY ADULT   BLOOD CULTURE   BLOOD CULTURE     Treatments provided: none  Family Comments: daughter updated regarding admit  OBS brochure/video discussed/provided to patient:  N/A  ED Medications:   Medications   piperacillin-tazobactam (ZOSYN) intermittent infusion 4.5 g (has no administration in time range)   iopamidol (ISOVUE-370) solution 500 mL (61 mLs Intravenous Given 6/13/20 2348)   sodium chloride 0.9 % bag 500mL for CT scan flush use (81 mLs Intravenous Given 6/13/20 2348)   0.9% sodium chloride BOLUS (0 mLs Intravenous Stopped 6/14/20 0115)     Drips infusing:  No  For the majority of the shift, the patient's behavior Green    Sepsis treatment initiated: No       ED Nurse Name/Phone Number: Rosemarie Bowman RN,   1:50 AM  RECEIVING UNIT ED HANDOFF REVIEW    Above ED Nurse Handoff Report was reviewed: Yes  Reviewed by: Thu Alvarez RN on June 14, 2020 at 2:08 AM

## 2020-06-15 LAB
ANION GAP SERPL CALCULATED.3IONS-SCNC: 6 MMOL/L (ref 3–14)
BUN SERPL-MCNC: 17 MG/DL (ref 7–30)
CALCIUM SERPL-MCNC: 8 MG/DL (ref 8.5–10.1)
CHLORIDE SERPL-SCNC: 106 MMOL/L (ref 94–109)
CO2 SERPL-SCNC: 25 MMOL/L (ref 20–32)
CREAT SERPL-MCNC: 0.76 MG/DL (ref 0.66–1.25)
CRP SERPL-MCNC: 187 MG/L (ref 0–8)
ERYTHROCYTE [DISTWIDTH] IN BLOOD BY AUTOMATED COUNT: 14.8 % (ref 10–15)
GFR SERPL CREATININE-BSD FRML MDRD: 84 ML/MIN/{1.73_M2}
GLUCOSE SERPL-MCNC: 100 MG/DL (ref 70–99)
GRAM STN SPEC: NORMAL
HCT VFR BLD AUTO: 34.7 % (ref 40–53)
HGB BLD-MCNC: 11.4 G/DL (ref 13.3–17.7)
LACTATE BLD-SCNC: 0.9 MMOL/L (ref 0.7–2)
Lab: NORMAL
MCH RBC QN AUTO: 31.9 PG (ref 26.5–33)
MCHC RBC AUTO-ENTMCNC: 32.9 G/DL (ref 31.5–36.5)
MCV RBC AUTO: 97 FL (ref 78–100)
PLATELET # BLD AUTO: 129 10E9/L (ref 150–450)
POTASSIUM SERPL-SCNC: 3.7 MMOL/L (ref 3.4–5.3)
PROCALCITONIN SERPL-MCNC: 0.48 NG/ML
RBC # BLD AUTO: 3.57 10E12/L (ref 4.4–5.9)
SODIUM SERPL-SCNC: 137 MMOL/L (ref 133–144)
SPECIMEN SOURCE: NORMAL
WBC # BLD AUTO: 9.6 10E9/L (ref 4–11)

## 2020-06-15 PROCEDURE — 25000128 H RX IP 250 OP 636: Performed by: HOSPITALIST

## 2020-06-15 PROCEDURE — 25000132 ZZH RX MED GY IP 250 OP 250 PS 637: Mod: GY | Performed by: HOSPITALIST

## 2020-06-15 PROCEDURE — 87070 CULTURE OTHR SPECIMN AEROBIC: CPT | Performed by: INTERNAL MEDICINE

## 2020-06-15 PROCEDURE — 83605 ASSAY OF LACTIC ACID: CPT | Performed by: HOSPITALIST

## 2020-06-15 PROCEDURE — 12000000 ZZH R&B MED SURG/OB

## 2020-06-15 PROCEDURE — 85027 COMPLETE CBC AUTOMATED: CPT | Performed by: INTERNAL MEDICINE

## 2020-06-15 PROCEDURE — 25800030 ZZH RX IP 258 OP 636: Performed by: INTERNAL MEDICINE

## 2020-06-15 PROCEDURE — 80048 BASIC METABOLIC PNL TOTAL CA: CPT | Performed by: INTERNAL MEDICINE

## 2020-06-15 PROCEDURE — 36415 COLL VENOUS BLD VENIPUNCTURE: CPT | Performed by: HOSPITALIST

## 2020-06-15 PROCEDURE — 84145 PROCALCITONIN (PCT): CPT | Performed by: HOSPITALIST

## 2020-06-15 PROCEDURE — 40000275 ZZH STATISTIC RCP TIME EA 10 MIN

## 2020-06-15 PROCEDURE — 99233 SBSQ HOSP IP/OBS HIGH 50: CPT | Performed by: HOSPITALIST

## 2020-06-15 PROCEDURE — 87205 SMEAR GRAM STAIN: CPT | Performed by: INTERNAL MEDICINE

## 2020-06-15 PROCEDURE — 25000132 ZZH RX MED GY IP 250 OP 250 PS 637: Mod: GY | Performed by: INTERNAL MEDICINE

## 2020-06-15 PROCEDURE — 94640 AIRWAY INHALATION TREATMENT: CPT

## 2020-06-15 PROCEDURE — 86140 C-REACTIVE PROTEIN: CPT | Performed by: INTERNAL MEDICINE

## 2020-06-15 PROCEDURE — 36415 COLL VENOUS BLD VENIPUNCTURE: CPT | Performed by: INTERNAL MEDICINE

## 2020-06-15 PROCEDURE — 86140 C-REACTIVE PROTEIN: CPT | Performed by: HOSPITALIST

## 2020-06-15 PROCEDURE — 25000128 H RX IP 250 OP 636: Performed by: INTERNAL MEDICINE

## 2020-06-15 RX ADMIN — TAZOBACTAM SODIUM AND PIPERACILLIN SODIUM 3.38 G: 375; 3 INJECTION, SOLUTION INTRAVENOUS at 18:21

## 2020-06-15 RX ADMIN — DOCUSATE SODIUM 100 MG: 100 CAPSULE, LIQUID FILLED ORAL at 19:32

## 2020-06-15 RX ADMIN — ACETAMINOPHEN 1000 MG: 500 TABLET, FILM COATED ORAL at 18:21

## 2020-06-15 RX ADMIN — TAZOBACTAM SODIUM AND PIPERACILLIN SODIUM 3.38 G: 375; 3 INJECTION, SOLUTION INTRAVENOUS at 01:15

## 2020-06-15 RX ADMIN — AZITHROMYCIN DIHYDRATE 500 MG: 500 INJECTION, POWDER, LYOPHILIZED, FOR SOLUTION INTRAVENOUS at 03:27

## 2020-06-15 RX ADMIN — ACETAMINOPHEN 1000 MG: 500 TABLET, FILM COATED ORAL at 03:27

## 2020-06-15 RX ADMIN — TAZOBACTAM SODIUM AND PIPERACILLIN SODIUM 3.38 G: 375; 3 INJECTION, SOLUTION INTRAVENOUS at 13:23

## 2020-06-15 RX ADMIN — ATORVASTATIN CALCIUM 40 MG: 40 TABLET, FILM COATED ORAL at 22:46

## 2020-06-15 RX ADMIN — RIVAROXABAN 20 MG: 20 TABLET, FILM COATED ORAL at 17:43

## 2020-06-15 RX ADMIN — DICLOFENAC SODIUM 2 G: 10 GEL TOPICAL at 18:27

## 2020-06-15 RX ADMIN — ACETAMINOPHEN 1000 MG: 500 TABLET, FILM COATED ORAL at 11:11

## 2020-06-15 RX ADMIN — TAZOBACTAM SODIUM AND PIPERACILLIN SODIUM 3.38 G: 375; 3 INJECTION, SOLUTION INTRAVENOUS at 06:24

## 2020-06-15 RX ADMIN — DICLOFENAC SODIUM 2 G: 10 GEL TOPICAL at 13:31

## 2020-06-15 RX ADMIN — FAMOTIDINE 10 MG: 10 TABLET ORAL at 19:32

## 2020-06-15 RX ADMIN — DICLOFENAC SODIUM 2 G: 10 GEL TOPICAL at 09:32

## 2020-06-15 RX ADMIN — LIDOCAINE 1 PATCH: 560 PATCH PERCUTANEOUS; TOPICAL; TRANSDERMAL at 19:32

## 2020-06-15 RX ADMIN — FLUTICASONE FUROATE AND VILANTEROL TRIFENATATE 1 PUFF: 100; 25 POWDER RESPIRATORY (INHALATION) at 08:16

## 2020-06-15 RX ADMIN — METOPROLOL SUCCINATE 50 MG: 50 TABLET, EXTENDED RELEASE ORAL at 09:31

## 2020-06-15 RX ADMIN — ASPIRIN 81 MG: 81 TABLET, COATED ORAL at 09:31

## 2020-06-15 ASSESSMENT — ACTIVITIES OF DAILY LIVING (ADL)
ADLS_ACUITY_SCORE: 17
ADLS_ACUITY_SCORE: 17
ADLS_ACUITY_SCORE: 12
ADLS_ACUITY_SCORE: 17

## 2020-06-15 ASSESSMENT — MIFFLIN-ST. JEOR: SCORE: 1544.02

## 2020-06-15 NOTE — PLAN OF CARE
Orientation: Alert to self only. Forgetful. Sitter at bedside.   VSS. 98% on 2L NC. Afebrile.    LS: diminished. Coarse throughout.   GI: Passing gas. no BM. Denies N/V.   : Adequate urine output.   Skin: bruises. Right wrist skin tear. Mepilex in place.   Activity: SBA. Up to bathroom overnight. Pt slept comfortably throughout shift.   Pain: 0/10. Denies pain throughout shift. No PRN interventions throughout,.   Plan: Continue with current cares.

## 2020-06-15 NOTE — PLAN OF CARE
Oriented to self only. VSS on 1.5L-2L O2 per NC (attempted to wean to 1L, desat to 88%). C/O left chest / abd pain w/ weight shift assistance, refused offered PRN analgesics. Getting scheduled PO tylenol and has lidocaine patch in place. Tele a-fib CVR, denied CP . LS dim, no SOB reported. PIV to right intact, SL . Up SBA GB, ambulated halls w/ sitter. Good UOP. On IV zosyn and IV azithromycin. Sputum sample collected. Sitter remains at bedside for confusion. Will continue POC.

## 2020-06-15 NOTE — PROGRESS NOTES
[facility]    Hospitalist Progress Note    Date of Service (when I saw the patient): 06/15/2020  Provider:  Luis Antonio Gregory MD   Text Page  7am - 6PM       Assessment & Plan   William Ba is a 85 year old male who was admitted on 6/13/2020.  It is his second admission in the last 7 days.  He initially was here reporting 6/10 and 6/12 after mechanical fall sustaining trauma to his left hemithorax and 8th arch  rib fracture.  He was diagnosed with a small hydropneumothorax, consulted with thoracic surgery and discharged to [facility] where he lives with his wife.  He readmitted with report of increasing productive cough and low-grade fever.  On admission he had leukocytosis with neutrophilia, CT of the chest is positive for right lower lobe infiltrate and mucous plugging.  She has been admitted for further treatment and follow-up.    Active Problems:  1.  Right lower lung pneumonia suspicious for hospital-acquired after recent admission.  Leukocytosis, low-grade fever, episode of hypoxemia on admission, increasing cough and mucus production.  -Inpatient status.  -Regular diet  -Continue Zosyn IV.  - Incentive spirometry.  - Nebs PRN.  -O2 as needed.    2.  Left-sided hydropneumothorax secondary to mechanical fall/8th rib fracture.  Consulted with thoracic surgeon, not amenable for any intervention at this point.  Plan is to see him in 2 weeks in the outpatient setting.  -Pain control.  -Incentive spirometry.    3.  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   -Breo Ellipta.  -Rivaroxaban    4.  Dementia, seems to be severe.  Suspect Alzheimer type.  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    5.  Atrial fibrillation rate controlled on rivaroxaban.  Intermittently he has tacky arrhythmia, no hyperdynamic repercuussion.   -Cardiac telemetry.  -Fever accident     6.   History of coronary artery disease.  Appears to be stable.    7.  Prostate cancer stage IIa diagnosed 2015.9      DVT Prophylaxis: Rivaroxaban  Code Status: Full Code    Disposition: Expected discharge in 2 -3  days once not having fever, antibiotic outpatient plan  outlined    Interval History   Quietly in bed, indifferent.  Not complaining of chest pain or shortness of breath at this moment.  He is not clear all the reason why he is in the hospital.  He does not remember his last admission.  Denies difficulty with swallowing.  No urinary issues, normal bowel movement.    -Data reviewed today: I reviewed all new labs and imaging results over the last 24 hours. I personally reviewed the EKG tracing showing AF  with HR < 100.    Physical Exam   Temp: 96.8  F (36  C) Temp src: Oral BP: 121/79 Pulse: 92 Heart Rate: 129 Resp: 22 SpO2: 100 % O2 Device: Nasal cannula Oxygen Delivery: 2 LPM  Vitals:    06/14/20 0244 06/15/20 0732   Weight: 79.4 kg (175 lb) 78.4 kg (172 lb 14.4 oz)     Vital Signs with Ranges  Temp:  [96.8  F (36  C)-98.4  F (36.9  C)] 96.8  F (36  C)  Pulse:  [92] 92  Heart Rate:  [] 129  Resp:  [19-22] 22  BP: (104-139)/(60-79) 121/79  SpO2:  [90 %-100 %] 100 %  I/O last 3 completed shifts:  In: 563 [P.O.:560; I.V.:3]  Out: -     GEN:  Alert, not oriented x 3, appears comfortable, NAD.  HEENT:  Normocephalic/atraumatic, no scleral icterus, no nasal discharge, mouth moist.  CV:  IRIR, no murmur or JVD.     LUNGS:  Diminished to auscultation RLL with scanty rhonchi.  Symmetric chest rise on inhalation noted.  ABD:  Active bowel sounds, soft, non-tender/non-distended.  No rebound/guarding/rigidity.  EXT:  No edema or cyanosis.  No joint synovitis noted.  SKIN:  Dry to touch, no exanthems noted in the visualized areas.       Medications     - MEDICATION INSTRUCTIONS -         acetaminophen  1,000 mg Oral Q8H     aspirin  81 mg Oral Daily     atorvastatin  40 mg Oral At Bedtime     azithromycin  500 mg  Intravenous Q24H     diclofenac  2 g Transdermal 4x Daily     docusate sodium  100 mg Oral QPM     famotidine  10 mg Oral QPM     fluticasone-vilanterol  1 puff Inhalation Daily     Lidocaine  1 patch Transdermal Q24H     lidocaine   Transdermal Q8H     metoprolol succinate ER  50 mg Oral Daily     piperacillin-tazobactam  3.375 g Intravenous Q6H     rivaroxaban ANTICOAGULANT  20 mg Oral Daily with supper     sodium chloride (PF)  3 mL Intracatheter Q8H       Data   Recent Labs   Lab 06/15/20  0612 06/13/20  2307 06/11/20  0901 06/10/20  2256   WBC 9.6 15.9* 8.2 8.7   HGB 11.4* 13.2* 12.6* 12.7*   MCV 97 98 98 99   * 140* 130* 138*   INR  --   --   --  2.68*    136 140 141   POTASSIUM 3.7 3.6 3.9 4.0   CHLORIDE 106 104 108 111*   CO2 25 26 23 27   BUN 17 19 19 25   CR 0.76 0.77 0.67 0.83   ANIONGAP 6 6 9 3   NIEVES 8.0* 8.3* 8.0* 8.4*   * 101* 90 87   TROPI  --  <0.015  --   --        No results found for this or any previous visit (from the past 24 hour(s)).          Disclaimer: This note consists of symbols derived from keyboarding, dictation and/or voice recognition software. As a result, there may be errors in the script that have gone undetected. Please consider this when interpreting information found in this chart.

## 2020-06-15 NOTE — CONSULTS
Care Transition Initial Assessment - RN        Spoke with: patient's daughterErin. Daughter is a retired nurse.  DATA   Active Problems:    Community acquired pneumonia       Cognitive Status: alert, not oriented x3  Primary Care Clinic Name:  [facility]   Primary Care MD Name: Dr. Adryan James  Contact information and PCP information verified: Yes  Lives With: spouse([facility], was discharge to [facility] after last inpt 6/12)   Living Arrangements: assisted living  Quality of Family Relationships: involved, supportive  Description of Support System: Supportive, Involved   Who is your support system?: Children   Support Assessment: Adequate family and caregiver support   Insurance concerns: No Insurance issues identified  ASSESSMENT  Patient currently receives the following services:  Recently discharged home with his daughterErin and home care PT/OT thru [facility] 6/12. HC service verified with [facility], #[phone]. Initial start up assessment not yet completed due to readmission.         Identified issues/concerns regarding health management: CTS identifies patient high risk with PNA diagnosis and recent inpatient 6/10-12 d/t fall, hydropneumothorax and rib fracture. Patient readmitted 6/13; within 24 hours of last discharge. He is currently oriented to self only. Spoke with daughter, Minerva.  Daughter states prior to 6/10 admission,  patient lived at [facility] with his wife who has Alzheimers. Patient has been independent with ambulation and ADLs. He discharged to his daughter's house with [facility] PT/OT after last admission. She anticipates patient will eventually return to [facility] when he regains his strength.  Daughter states she plans for patient to return to her home after this admission with resumption of PT/OT services.    I updated AVS with PNA education information. Family encouraged to f/u with PCP. Patient PCP, Dr. Adryan James, has changed clinics from [facility]  to [facility]. Patient has not established care with [facility]. There are no appointments available with Dr. James for >1 month, pt will need to follow up with another provider at the clinic. Daughter aware and states would like to think about PCP options. She was also given [facility] as an option.     No follow up scheduled at this time. Family will notify CC if she needs support with scheduling.     PLAN  Financial costs for the patient include none .  Patient given options and choices for discharge: Yes.    Patient anticipates discharging to  home with daughter and resumption of [facility] PT/OT, likely additional of RN. Will need home care orders at discharge and face to face if other services added.       Patient anticipates needs for home equipment: No  Transportation/person available to transport on day of discharge  is Erin bello.      Plan/Disposition: home with daughter and resumption of home health services  Appointments: Family plans to schedule own or call CTS if needing support with scheduling appointment with PCP of their choice.      Care  (CTS) will continue to follow as needed.    Leno Main RN BSN PHN CCM   Inpatient Care Coordination   [facility]   [phone]

## 2020-06-15 NOTE — PLAN OF CARE
VSS. Pt A/O X1, self. 2LPM. Pleasant, calm, and cooperative during shift. Slept most of shift. Sat in chair X1. Pt triggered sepsis protocol due to RR and HR, MD notified. Pt put on tele, A-fib, CVR. IV Zosyin completed per MAR. Lungs clear, diminished, with infrequent productive cough. Pt complains of pain when coughing. Tylenol given per MAR for pain. Takes meds in applesauce. PIV Saline locked, covered with a no-no, for pt safety. Sitter in room for pt safety. SBA, with walker and gait belt. Continue to monitor and POC.

## 2020-06-15 NOTE — PROVIDER NOTIFICATION
Web based paging message to provider: Pt triggered sepsis protocol, VS and Lactic is ordered.      Discussed with provider: no further action for sepsis protocol. Telemetry monitoring ordered per MD.

## 2020-06-16 PROCEDURE — 25000132 ZZH RX MED GY IP 250 OP 250 PS 637: Mod: GY | Performed by: HOSPITALIST

## 2020-06-16 PROCEDURE — 40000275 ZZH STATISTIC RCP TIME EA 10 MIN

## 2020-06-16 PROCEDURE — 25000128 H RX IP 250 OP 636: Performed by: INTERNAL MEDICINE

## 2020-06-16 PROCEDURE — 25000132 ZZH RX MED GY IP 250 OP 250 PS 637: Mod: GY | Performed by: INTERNAL MEDICINE

## 2020-06-16 PROCEDURE — 12000000 ZZH R&B MED SURG/OB

## 2020-06-16 PROCEDURE — 94640 AIRWAY INHALATION TREATMENT: CPT

## 2020-06-16 PROCEDURE — 25000128 H RX IP 250 OP 636: Performed by: HOSPITALIST

## 2020-06-16 PROCEDURE — 25800030 ZZH RX IP 258 OP 636: Performed by: INTERNAL MEDICINE

## 2020-06-16 PROCEDURE — 99233 SBSQ HOSP IP/OBS HIGH 50: CPT | Performed by: HOSPITALIST

## 2020-06-16 RX ADMIN — DICLOFENAC SODIUM 2 G: 10 GEL TOPICAL at 10:20

## 2020-06-16 RX ADMIN — FAMOTIDINE 10 MG: 10 TABLET ORAL at 20:32

## 2020-06-16 RX ADMIN — RIVAROXABAN 20 MG: 20 TABLET, FILM COATED ORAL at 17:59

## 2020-06-16 RX ADMIN — METOPROLOL SUCCINATE 50 MG: 50 TABLET, EXTENDED RELEASE ORAL at 10:19

## 2020-06-16 RX ADMIN — ATORVASTATIN CALCIUM 40 MG: 40 TABLET, FILM COATED ORAL at 21:36

## 2020-06-16 RX ADMIN — TAZOBACTAM SODIUM AND PIPERACILLIN SODIUM 3.38 G: 375; 3 INJECTION, SOLUTION INTRAVENOUS at 01:27

## 2020-06-16 RX ADMIN — ACETAMINOPHEN 1000 MG: 500 TABLET, FILM COATED ORAL at 03:33

## 2020-06-16 RX ADMIN — LIDOCAINE 1 PATCH: 560 PATCH PERCUTANEOUS; TOPICAL; TRANSDERMAL at 20:32

## 2020-06-16 RX ADMIN — FLUTICASONE FUROATE AND VILANTEROL TRIFENATATE 1 PUFF: 100; 25 POWDER RESPIRATORY (INHALATION) at 07:50

## 2020-06-16 RX ADMIN — DICLOFENAC SODIUM 2 G: 10 GEL TOPICAL at 18:44

## 2020-06-16 RX ADMIN — ACETAMINOPHEN 1000 MG: 500 TABLET, FILM COATED ORAL at 17:59

## 2020-06-16 RX ADMIN — Medication 1 MG: at 23:56

## 2020-06-16 RX ADMIN — TAZOBACTAM SODIUM AND PIPERACILLIN SODIUM 3.38 G: 375; 3 INJECTION, SOLUTION INTRAVENOUS at 18:44

## 2020-06-16 RX ADMIN — TRAMADOL HYDROCHLORIDE 25 MG: 50 TABLET, FILM COATED ORAL at 06:39

## 2020-06-16 RX ADMIN — TAZOBACTAM SODIUM AND PIPERACILLIN SODIUM 3.38 G: 375; 3 INJECTION, SOLUTION INTRAVENOUS at 13:38

## 2020-06-16 RX ADMIN — DICLOFENAC SODIUM 2 G: 10 GEL TOPICAL at 13:37

## 2020-06-16 RX ADMIN — AZITHROMYCIN DIHYDRATE 500 MG: 500 INJECTION, POWDER, LYOPHILIZED, FOR SOLUTION INTRAVENOUS at 03:34

## 2020-06-16 RX ADMIN — ASPIRIN 81 MG: 81 TABLET, COATED ORAL at 10:19

## 2020-06-16 RX ADMIN — TAZOBACTAM SODIUM AND PIPERACILLIN SODIUM 3.38 G: 375; 3 INJECTION, SOLUTION INTRAVENOUS at 06:38

## 2020-06-16 RX ADMIN — ACETAMINOPHEN 1000 MG: 500 TABLET, FILM COATED ORAL at 10:18

## 2020-06-16 ASSESSMENT — ACTIVITIES OF DAILY LIVING (ADL)
ADLS_ACUITY_SCORE: 12
ADLS_ACUITY_SCORE: 12
ADLS_ACUITY_SCORE: 18
ADLS_ACUITY_SCORE: 17
ADLS_ACUITY_SCORE: 18
ADLS_ACUITY_SCORE: 17

## 2020-06-16 NOTE — PLAN OF CARE
Oriented to self only, confused. VSS on RA. Denied pain. Tele a-fib CVR, denied CP . LS dim, no SOB reported. PIV to right intact, SL . Mepilex to right LFA over skin tear CDI. Up SBA GB, ambulated halls. Tolerating regular diet. Sitter remains at bedside for confusion. Possible discharge 1-2 days. Will continue POC.

## 2020-06-16 NOTE — PROGRESS NOTES
[facility]    Hospitalist Progress Note    Date of Service (when I saw the patient): 06/16/2020  Provider:  Luis Antonio Gregory MD   Text Page  7am - 6PM       Assessment & Plan   William Ba is a 85 year old male who was admitted on 6/13/2020.  It is his second admission in the last 7 days.  He initially was here reporting 6/10 and 6/12 after mechanical fall sustaining trauma to his left hemithorax and 8th arch  rib fracture.  He was diagnosed with a small hydropneumothorax, consulted with thoracic surgery and discharged to [facility] where he lives with his wife.  He readmitted with report of increasing productive cough and low-grade fever.  On admission he had leukocytosis with neutrophilia, CT of the chest is positive for right lower lobe infiltrate and mucous plugging.  She has been admitted for further treatment and follow-up.    Active Problems:  1.  Right lower lung pneumonia suspicious for hospital-acquired after recent admission.  Leukocytosis, low-grade fever, episode of hypoxemia on admission, increasing cough and mucus production.  -Inpatient status.  -Regular diet  -Continue Zosyn IV.  - Incentive spirometry.  - Nebs PRN.  -O2 as needed.    2.  Left-sided hydropneumothorax secondary to mechanical fall/8th rib fracture.  Consulted with thoracic surgeon, not amenable for any intervention at this point.  Plan is to see him in 2 weeks in the outpatient setting.  -Pain control.  -Incentive spirometry.    3.  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   -Breo Ellipta.  -Rivaroxaban    4.  Dementia, seems to be severe.  Suspect Alzheimer type.  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    5.  Atrial fibrillation rate controlled on rivaroxaban.  Intermittently he has tacky arrhythmia, no hyperdynamic repercuussion.   -Cardiac telemetry.  -Fever accident     6.   History of coronary artery disease.  Appears to be stable.    7.  Prostate cancer stage IIa diagnosed 2015.9      DVT Prophylaxis: Rivaroxaban  Code Status: Full Code    Disposition: Expected discharge in 1-2  days once not having fever, antibiotic outpatient plan  outlined    Interval History   Afebrile. Not complaining of chest pain or shortness of breath.  Pleasantly demented. Denies difficulty with swallowing.  No urinary issues, normal bowel movement.    -Data reviewed today: I reviewed all new labs and imaging results over the last 24 hours. I personally reviewed the EKG tracing showing AF  with HR < 100.    Physical Exam   Temp: 96.5  F (35.8  C) Temp src: Oral BP: 123/82   Heart Rate: 77 Resp: 20 SpO2: 98 % O2 Device: None (Room air) Oxygen Delivery: 1.5 LPM  Vitals:    06/14/20 0244 06/15/20 0732   Weight: 79.4 kg (175 lb) 78.4 kg (172 lb 14.4 oz)     Vital Signs with Ranges  Temp:  [96.3  F (35.7  C)-98.9  F (37.2  C)] 96.5  F (35.8  C)  Heart Rate:  [77-99] 77  Resp:  [16-20] 20  BP: ()/(67-87) 123/82  SpO2:  [94 %-98 %] 98 %  I/O last 3 completed shifts:  In: 240 [P.O.:240]  Out: -     GEN:  Alert, not oriented x 3, appears comfortable, NAD.  HEENT:  Normocephalic/atraumatic, no scleral icterus, no nasal discharge, mouth moist.  CV:  IRIR, no murmur or JVD.     LUNGS:  Diminished to auscultation RLL with scanty rhonchi.  Symmetric chest rise on inhalation noted.  ABD:  Active bowel sounds, soft, non-tender/non-distended.  No rebound/guarding/rigidity.  EXT:  No edema or cyanosis.  No joint synovitis noted.  SKIN:  Dry to touch, no exanthems noted in the visualized areas.       Medications     - MEDICATION INSTRUCTIONS -         acetaminophen  1,000 mg Oral Q8H     aspirin  81 mg Oral Daily     atorvastatin  40 mg Oral At Bedtime     azithromycin  500 mg Intravenous Q24H     diclofenac  2 g Transdermal 4x Daily     docusate sodium  100 mg Oral QPM     famotidine  10 mg Oral QPM      fluticasone-vilanterol  1 puff Inhalation Daily     Lidocaine  1 patch Transdermal Q24H     lidocaine   Transdermal Q8H     metoprolol succinate ER  50 mg Oral Daily     piperacillin-tazobactam  3.375 g Intravenous Q6H     rivaroxaban ANTICOAGULANT  20 mg Oral Daily with supper     sodium chloride (PF)  3 mL Intracatheter Q8H       Data   Recent Labs   Lab 06/15/20  0612 06/13/20  2307 06/11/20  0901 06/10/20  2256   WBC 9.6 15.9* 8.2 8.7   HGB 11.4* 13.2* 12.6* 12.7*   MCV 97 98 98 99   * 140* 130* 138*   INR  --   --   --  2.68*    136 140 141   POTASSIUM 3.7 3.6 3.9 4.0   CHLORIDE 106 104 108 111*   CO2 25 26 23 27   BUN 17 19 19 25   CR 0.76 0.77 0.67 0.83   ANIONGAP 6 6 9 3   NIEVES 8.0* 8.3* 8.0* 8.4*   * 101* 90 87   TROPI  --  <0.015  --   --        No results found for this or any previous visit (from the past 24 hour(s)).          Disclaimer: This note consists of symbols derived from keyboarding, dictation and/or voice recognition software. As a result, there may be errors in the script that have gone undetected. Please consider this when interpreting information found in this chart.

## 2020-06-16 NOTE — PLAN OF CARE
Alert to self only. VSS on 1.5 LPM. Reports pain in ribs on left side; given scheduled Tylenol Q8H. Continues on IV zosyn and Zithromax. Sitter present at bedside. Tele a fib CVR-RVR with occasional PVC. Anticipate patient will discharge home with daughter with [facility] PT/OT in 1-2 days. Will continue with plan of care.

## 2020-06-16 NOTE — PLAN OF CARE
"VSS. Afebrile. Tele A-fib, CVR. IV Zosyn per MAR.  Pt has sitter in room. SBA. Ambulated in hallway X1, denied SOB. Took shower in am. PT denies pain. Meplex changed today, no-no in place. Daughter, Erin, was updated, stated \"he loves to walk, so that would be great for him to continue to do.\" Continue to monitor and POC.   "

## 2020-06-17 VITALS
HEART RATE: 92 BPM | OXYGEN SATURATION: 94 % | DIASTOLIC BLOOD PRESSURE: 76 MMHG | RESPIRATION RATE: 18 BRPM | WEIGHT: 172.9 LBS | HEIGHT: 74 IN | BODY MASS INDEX: 22.19 KG/M2 | SYSTOLIC BLOOD PRESSURE: 109 MMHG | TEMPERATURE: 98 F

## 2020-06-17 LAB
ANION GAP SERPL CALCULATED.3IONS-SCNC: 6 MMOL/L (ref 3–14)
BACTERIA SPEC CULT: NORMAL
BASOPHILS # BLD AUTO: 0 10E9/L (ref 0–0.2)
BASOPHILS NFR BLD AUTO: 0.1 %
BUN SERPL-MCNC: 14 MG/DL (ref 7–30)
CALCIUM SERPL-MCNC: 8.1 MG/DL (ref 8.5–10.1)
CHLORIDE SERPL-SCNC: 108 MMOL/L (ref 94–109)
CO2 SERPL-SCNC: 27 MMOL/L (ref 20–32)
CREAT SERPL-MCNC: 0.88 MG/DL (ref 0.66–1.25)
CRP SERPL-MCNC: 130 MG/L (ref 0–8)
DIFFERENTIAL METHOD BLD: ABNORMAL
EOSINOPHIL # BLD AUTO: 0.3 10E9/L (ref 0–0.7)
EOSINOPHIL NFR BLD AUTO: 4.2 %
ERYTHROCYTE [DISTWIDTH] IN BLOOD BY AUTOMATED COUNT: 14.6 % (ref 10–15)
GFR SERPL CREATININE-BSD FRML MDRD: 79 ML/MIN/{1.73_M2}
GLUCOSE SERPL-MCNC: 102 MG/DL (ref 70–99)
HCT VFR BLD AUTO: 34.6 % (ref 40–53)
HGB BLD-MCNC: 11.1 G/DL (ref 13.3–17.7)
IMM GRANULOCYTES # BLD: 0 10E9/L (ref 0–0.4)
IMM GRANULOCYTES NFR BLD: 0.4 %
LYMPHOCYTES # BLD AUTO: 1.4 10E9/L (ref 0.8–5.3)
LYMPHOCYTES NFR BLD AUTO: 19.2 %
MCH RBC QN AUTO: 31.4 PG (ref 26.5–33)
MCHC RBC AUTO-ENTMCNC: 32.1 G/DL (ref 31.5–36.5)
MCV RBC AUTO: 98 FL (ref 78–100)
MONOCYTES # BLD AUTO: 0.7 10E9/L (ref 0–1.3)
MONOCYTES NFR BLD AUTO: 9.4 %
NEUTROPHILS # BLD AUTO: 4.8 10E9/L (ref 1.6–8.3)
NEUTROPHILS NFR BLD AUTO: 66.7 %
NRBC # BLD AUTO: 0 10*3/UL
NRBC BLD AUTO-RTO: 0 /100
PLATELET # BLD AUTO: 143 10E9/L (ref 150–450)
POTASSIUM SERPL-SCNC: 3.6 MMOL/L (ref 3.4–5.3)
RBC # BLD AUTO: 3.54 10E12/L (ref 4.4–5.9)
SODIUM SERPL-SCNC: 141 MMOL/L (ref 133–144)
SPECIMEN SOURCE: NORMAL
WBC # BLD AUTO: 7.1 10E9/L (ref 4–11)

## 2020-06-17 PROCEDURE — 80048 BASIC METABOLIC PNL TOTAL CA: CPT | Performed by: HOSPITALIST

## 2020-06-17 PROCEDURE — 40000275 ZZH STATISTIC RCP TIME EA 10 MIN

## 2020-06-17 PROCEDURE — 99239 HOSP IP/OBS DSCHRG MGMT >30: CPT | Performed by: HOSPITALIST

## 2020-06-17 PROCEDURE — 85025 COMPLETE CBC W/AUTO DIFF WBC: CPT | Performed by: HOSPITALIST

## 2020-06-17 PROCEDURE — 25000132 ZZH RX MED GY IP 250 OP 250 PS 637: Mod: GY | Performed by: INTERNAL MEDICINE

## 2020-06-17 PROCEDURE — 36415 COLL VENOUS BLD VENIPUNCTURE: CPT | Performed by: HOSPITALIST

## 2020-06-17 PROCEDURE — 86140 C-REACTIVE PROTEIN: CPT | Performed by: HOSPITALIST

## 2020-06-17 PROCEDURE — 25000128 H RX IP 250 OP 636: Performed by: INTERNAL MEDICINE

## 2020-06-17 PROCEDURE — 25800030 ZZH RX IP 258 OP 636: Performed by: INTERNAL MEDICINE

## 2020-06-17 PROCEDURE — 25000128 H RX IP 250 OP 636: Performed by: HOSPITALIST

## 2020-06-17 PROCEDURE — 94640 AIRWAY INHALATION TREATMENT: CPT

## 2020-06-17 RX ORDER — LEVOFLOXACIN 750 MG/1
750 TABLET, FILM COATED ORAL DAILY
Qty: 5 TABLET | Refills: 0 | Status: SHIPPED | OUTPATIENT
Start: 2020-06-17 | End: 2020-07-01

## 2020-06-17 RX ADMIN — ACETAMINOPHEN 1000 MG: 500 TABLET, FILM COATED ORAL at 05:05

## 2020-06-17 RX ADMIN — TAZOBACTAM SODIUM AND PIPERACILLIN SODIUM 3.38 G: 375; 3 INJECTION, SOLUTION INTRAVENOUS at 01:28

## 2020-06-17 RX ADMIN — AZITHROMYCIN DIHYDRATE 500 MG: 500 INJECTION, POWDER, LYOPHILIZED, FOR SOLUTION INTRAVENOUS at 03:08

## 2020-06-17 RX ADMIN — FLUTICASONE FUROATE AND VILANTEROL TRIFENATATE 1 PUFF: 100; 25 POWDER RESPIRATORY (INHALATION) at 07:58

## 2020-06-17 RX ADMIN — ASPIRIN 81 MG: 81 TABLET, COATED ORAL at 09:15

## 2020-06-17 RX ADMIN — TAZOBACTAM SODIUM AND PIPERACILLIN SODIUM 3.38 G: 375; 3 INJECTION, SOLUTION INTRAVENOUS at 06:14

## 2020-06-17 RX ADMIN — ACETAMINOPHEN 1000 MG: 500 TABLET, FILM COATED ORAL at 13:33

## 2020-06-17 RX ADMIN — TRAMADOL HYDROCHLORIDE 25 MG: 50 TABLET, FILM COATED ORAL at 00:02

## 2020-06-17 RX ADMIN — DICLOFENAC SODIUM 2 G: 10 GEL TOPICAL at 13:33

## 2020-06-17 RX ADMIN — DICLOFENAC SODIUM 2 G: 10 GEL TOPICAL at 09:15

## 2020-06-17 RX ADMIN — TAZOBACTAM SODIUM AND PIPERACILLIN SODIUM 3.38 G: 375; 3 INJECTION, SOLUTION INTRAVENOUS at 14:03

## 2020-06-17 ASSESSMENT — ACTIVITIES OF DAILY LIVING (ADL)
ADLS_ACUITY_SCORE: 12

## 2020-06-17 NOTE — PLAN OF CARE
[id number] Text page to MD 0921: Sitting BP 97/62, HR was 102, now trending 80s-120s. Can you please add parameters for metoprolol XL(50mg). thank you         VSS. Alert to self and place. SBA.  LS-dim, exp wheezes on R side.  Denies SOB.  IV zosyn q6h, zithro q24h.  Voltaren gel to L ribs, currently denies pain.  Tramadol and scheduled tylenol for pain.  Tele- afib CVR although ranges from 80s-120s at times.  Possible discharge today.        2032 Text page to MD: Spoke to dtr and is concerned with tendon rupture and discharging on levaquin. Pt has contractures in fingers at baseline and due to age concerned. Please advise. thank you     Discussed with MD the probability is low but no way to know if this will occur.  Pt will be on antibiotic for 5 days.  And this particular antibiotic will take are of the infection best but we can try another option.      Informed dtr and stated levaquin will be fine.

## 2020-06-17 NOTE — PROGRESS NOTES
Paged hospitalist for resumption of home care orders for OT, PT & RN.     CM will continue to follow patient until discharge for any additional needs.     Minerva Stout RN, BSN, CPHN, CM  Inpatient Care Coordination  [facility]  [phone]

## 2020-06-17 NOTE — DISCHARGE SUMMARY
[facility]    Discharge Summary  Hospitalist    Date of Admission:  6/13/2020  Date of Discharge:  6/17/2020  Provider:  Luis Antonio Gregory MD  Date of Service (when I last saw the patient): 06/17/20    Discharge Diagnoses   1.  Right lower lung pneumonia, presumed hospital-acquired pneumonia, suspected gram negative organism though culprit not isolated.  2.  Left-sided hydropneumothorax.  3.  Known left chest trauma with nondisplaced 8th rib fracture  4.  COPD-emphysema.  5.  Severe dementia.  6.  Atrial fibrillation rate controlled on rivaroxaban.  7.  History of coronary artery disease  8.  History of prostate cancer stage IIa.      Other medical issues:  Past Medical History:   Diagnosis Date     Abdominal aortic aneurysm without rupture (H) 2/25/2016     Acute CVA (cerebrovascular accident) (H) 3/28/2019     Atrial fibrillation/flutter      Atrial fibrillation/flutter (H) 11/13/2019     COPD (chronic obstructive pulmonary disease)      Coronary atherosclerosis 1/29/2015     Mixed hyperlipidemia 11/8/2010       History of Present Illness   William Ba is an 85 year old male who presented with cough, low-grade fever and shortness of breath.  Please see the admission history and physical for full details.    Hospital Course   William Ba is a 85 year old male who was admitted on 6/13/2020.  It is his second admission in the last 7 days.  He initially was here reporting 6/10 and 6/12 after mechanical fall sustaining trauma to his left hemithorax and 8th arch  rib fracture.  He was diagnosed with a small hydropneumothorax, consulted with thoracic surgery and discharged to [facility] where he lives with his wife.  He readmitted with report of increasing productive cough and low-grade fever.  On admission he had leukocytosis with neutrophilia, CT of the chest is positive for right lower lobe infiltrate and mucous plugging.  She has been admitted for further treatment and  follow-up.    Hospital course.  Clinical stability and excellent response to treatment with Zosyn IV.  He will complete treatment at home with levofloxacin.  Follow-up with primary care physician and thoracic surgeon.     Active Problems:  1.  Right lower lung pneumonia suspicious for hospital-acquired after recent admission.  Leukocytosis, low-grade fever, episode of hypoxemia on admission, increasing cough and mucus production.  -Inpatient status.  -Regular diet  -Continue Zosyn IV.  - Incentive spirometry.  - Nebs PRN.  -O2 as needed.     2.  Left-sided hydropneumothorax secondary to mechanical fall/8th rib fracture.  Consulted with thoracic surgeon, not amenable for any intervention at this point.  Plan is to see him in 2 weeks in the outpatient setting.  -Pain control.  -Incentive spirometry.     3.  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   -Breo Ellipta.  -Rivaroxaban     4.  Dementia, seems to be severe.  Suspect Alzheimer type.  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     5.  Atrial fibrillation rate controlled on rivaroxaban.  Intermittently he has tacky arrhythmia, no hyperdynamic repercuussion.   -Cardiac telemetry.  -Fever accident      6.  History of coronary artery disease.  Appears to be stable.     7.  Prostate cancer stage IIa diagnosed 2015.       # Discharge Pain Plan:    - Patient currently has NO PAIN and is not being prescribed pain medications on discharge.      Significant Results and Procedures   See below     Pending Results      Unresulted Labs Ordered in the Past 30 Days of this Admission     Date and Time Order Name Status Description    6/13/2020 2311 Blood culture Preliminary     6/13/2020 2247 Blood culture Preliminary           Code Status   Full Code       Primary Care Physician   Adryan James    GEN:  Alert, oriented x 3, appears comfortable, NAD.  HEENT:   Normocephalic/atraumatic, no scleral icterus, no nasal discharge, mouth moist.  CV:    IRIR, no murmur or JVD.    LUNGS:  Diminisahed to auscultation bilaterally. Symmetric chest rise on inhalation noted.  ABD:  Active bowel sounds, soft, non-tender/non-distended.  No rebound/guarding/rigidity.  EXT:  No edema or cyanosis.  No joint synovitis noted.  SKIN:  Dry to touch, no exanthems noted in the visualized areas.     Discharge Disposition   Discharged to home    Consultations This Hospital Stay   PHYSICAL THERAPY ADULT IP CONSULT  CARE COORDINATOR IP CONSULT    Time Spent on this Encounter   I, Luis Antonio Gregory MD, personally saw the patient today and spent greater than 30 minutes discharging this patient.     Discharge Orders   No discharge procedures on file.  Discharge Medications   Current Discharge Medication List      CONTINUE these medications which have NOT CHANGED    Details   acetaminophen (TYLENOL) 500 MG tablet Take 2 tablets (1,000 mg) by mouth every 8 hours  Qty: 100 tablet, Refills: 0    Associated Diagnoses: Closed fracture of multiple ribs of left side with routine healing      aspirin 81 MG EC tablet Take 81 mg by mouth daily       atorvastatin (LIPITOR) 40 MG tablet Take 40 mg by mouth At Bedtime       diclofenac (VOLTAREN) 1 % topical gel Place 2 g onto the skin 4 times daily  Qty: 100 g, Refills: 3    Associated Diagnoses: Closed fracture of multiple ribs of left side with routine healing      docusate sodium (COLACE) 100 MG capsule Take 100 mg by mouth every evening       famotidine (PEPCID) 10 MG tablet Take 10 mg by mouth every evening      fluticasone-vilanterol (BREO ELLIPTA) 100-25 MCG/INH inhaler Inhale 1 puff into the lungs      Lidocaine (LIDOCARE) 4 % Patch Place 1 patch onto the skin every 24 hours To prevent lidocaine toxicity, patient should be patch free for 12 hrs daily.  Qty: 14 patch, Refills: 3    Associated Diagnoses: Closed fracture of multiple ribs of left side with  routine healing      metoprolol succinate ER (TOPROL-XL) 50 MG 24 hr tablet Take 1 tablet (50 mg) by mouth daily  Qty: 90 tablet, Refills: 3    Associated Diagnoses: Atrial fibrillation/flutter (H)      Misc Natural Products (OSTEO BI-FLEX ADV TRIPLE ST) TABS Take 1 capsule by mouth 2 times daily       Multiple Vitamins-Minerals (MULTIVITAMIN ADULT PO) Take one(1) tablet daily.      rivaroxaban ANTICOAGULANT (XARELTO) 20 MG TABS tablet Take 20 mg by mouth daily (with dinner)      Saw [place] 160 MG CAPS Take 1 capsule by mouth daily       traMADol (ULTRAM) 50 MG tablet Take 0.5 tablets (25 mg) by mouth every 6 hours as needed for moderate pain  Qty: 15 tablet, Refills: 0    Associated Diagnoses: Closed fracture of multiple ribs of left side with routine healing           Allergies   No Known Allergies  Data   Most Recent 3 CBC's:  Recent Labs   Lab Test 06/17/20  0613 06/15/20  0612 06/13/20  2307   WBC 7.1 9.6 15.9*   HGB 11.1* 11.4* 13.2*   MCV 98 97 98   * 129* 140*      Most Recent 3 BMP's:  Recent Labs   Lab Test 06/17/20  0613 06/15/20  0612 06/13/20  2307    137 136   POTASSIUM 3.6 3.7 3.6   CHLORIDE 108 106 104   CO2 27 25 26   BUN 14 17 19   CR 0.88 0.76 0.77   ANIONGAP 6 6 6   NIEVES 8.1* 8.0* 8.3*   * 100* 101*     Most Recent 2 LFT's:No lab results found.  Most Recent INR's and Anticoagulation Dosing History:  Anticoagulation Dose History     Recent Dosing and Labs Latest Ref Rng & Units 6/10/2020    INR 0.86 - 1.14 2.68(H)        Most Recent 3 Troponin's:  Recent Labs   Lab Test 06/13/20  2307 10/26/19  0649 10/26/19  0450   TROPI <0.015 <0.015 <0.015     Most Recent Cholesterol Panel:No lab results found.  Most Recent 6 Bacteria Isolates From Any Culture (See EPIC Reports for Culture Details):  Recent Labs   Lab Test 06/15/20  1705 06/14/20  0159 06/13/20  2306   CULT Light growth  Normal zan   No growth after 3 days No growth after 3 days     Most Recent TSH, T4 and A1c Labs:No lab  results found.  Results for orders placed or performed during the hospital encounter of 06/13/20   CT Chest Pulmonary Embolism w Contrast    Narrative    EXAM: CT CHEST PULMONARY EMBOLISM W CONTRAST  LOCATION: [facility]  DATE/TIME: 6/13/2020 11:40 PM    INDICATION: Recent fall with hemopneumothorax. Cough, fever and shortness of breath.  COMPARISON: 06/11/2020  TECHNIQUE: CT chest pulmonary angiogram during arterial phase injection of IV contrast. Multiplanar reformats and MIP reconstructions were performed. Dose reduction techniques were used.   CONTRAST: 61mL Isovue-370    FINDINGS:  ANGIOGRAM CHEST: Pulmonary arteries are normal caliber and negative for pulmonary emboli. Thoracic aorta is not well opacified and is  indeterminate for dissection. No CT evidence of right heart strain.    LUNGS AND PLEURA: 7 mm nodular opacity right apex unchanged. Calcified granuloma left apex and right lung base. Emphysema. Mucous plugging in the right lung base. Interval development of right basilar infiltrate. Small left hydropneumothorax is again   seen.    MEDIASTINUM/AXILLAE: Atherosclerotic aorta. Prominent right hilar nodes. 1.5 x 1.6 cm node series 5 image 69. Right posterior mediastinal node image 83 1.2 x 2.7 cm. Coronary artery calcification.    UPPER ABDOMEN: Mild distention of the gallbladder.    MUSCULOSKELETAL: Degenerative change osseous structures. Lateral left ribs 7 and 8 rib fractures. Mild mid and lower thoracic compression deformities.      Impression    IMPRESSION:  1.  Interval development of right lower lobe infiltrate.  2.  Mucous plugging right lung base.  3.  Small left hydropneumothorax is again seen.  4.  Mildly enlarged mediastinal and right hilar nodes could be reactive.  5.  No pulmonary embolism.     Disclaimer: This note consists of symbols derived from keyboarding, dictation and/or voice recognition software. As a result, there may be errors in the script that have gone undetected.  Please consider this when interpreting information found in this chart.

## 2020-06-17 NOTE — PLAN OF CARE
VSS. A&O to self only. Pt c/o of pain in L rib cage, tramadol & Tylenol were given with relief. PSC at bedside. SBA w/ gb. Tele: A-Fib CVR. Possible discharge today. Will cont poc.

## 2020-06-19 ENCOUNTER — TELEPHONE (OUTPATIENT)
Dept: FAMILY MEDICINE | Facility: CLINIC | Age: 85
End: 2020-06-19

## 2020-06-19 NOTE — TELEPHONE ENCOUNTER
Liseth called needing approval today. With Dr. James out of office until next week, I approved this request under my name on behalf of Dr. James.

## 2020-06-19 NOTE — TELEPHONE ENCOUNTER
Liseth nurse coordinator at  [facility] called asking for the verbal ok for orders for pt. She received home care orders from the hospital when pt was admitted. She saw pt yesterday and is now seeking 1 additional nurse visit over the next 5 days to complete her nursing assessment. Pt has a follow-up scheduled with you for 07/01/20.    Please advise and I can call with the verbal ok    Liseth # [phone]    Thanks, Ivette

## 2020-06-20 LAB
BACTERIA SPEC CULT: NO GROWTH
BACTERIA SPEC CULT: NO GROWTH
Lab: NORMAL
SPECIMEN SOURCE: NORMAL
SPECIMEN SOURCE: NORMAL

## 2020-06-22 ENCOUNTER — TELEPHONE (OUTPATIENT)
Dept: FAMILY MEDICINE | Facility: CLINIC | Age: 85
End: 2020-06-22

## 2020-06-22 NOTE — TELEPHONE ENCOUNTER
Maria Esther from [facility] called and is requesting to get the ok for the following verbal orders:  -Skilled nursing 1 time a week for 1 week and then two times a week for 2 weeks  -OT evaluation and treat  -Has a small skin tear from a fall so she would like to do triple antibiotic with band aid 2-3 times a week until healed    Routing to Dr. James for review.      Maria Esther is aware that Dr. James is not back in office until tomorrow and this is ok to wait until then.     Maria Esther's call back number is [phone]

## 2020-06-25 ENCOUNTER — TELEPHONE (OUTPATIENT)
Dept: FAMILY MEDICINE | Facility: CLINIC | Age: 85
End: 2020-06-25

## 2020-06-25 NOTE — TELEPHONE ENCOUNTER
Andreina from [facility] called asking for orders. She is seeking PT 2x for 2 weeks then 1x for 1 week. This is to address strength, balance, and gait training.     Please advise and I can give Andreina the verbal ok.  Andreina # [phone]    Thanks, Ivette

## 2020-07-01 ENCOUNTER — OFFICE VISIT (OUTPATIENT)
Dept: FAMILY MEDICINE | Facility: CLINIC | Age: 85
End: 2020-07-01

## 2020-07-01 VITALS
WEIGHT: 175.6 LBS | SYSTOLIC BLOOD PRESSURE: 88 MMHG | TEMPERATURE: 98.4 F | BODY MASS INDEX: 22.55 KG/M2 | OXYGEN SATURATION: 97 % | DIASTOLIC BLOOD PRESSURE: 52 MMHG | HEART RATE: 71 BPM

## 2020-07-01 DIAGNOSIS — J18.9 PNEUMONIA OF LEFT LOWER LOBE DUE TO INFECTIOUS ORGANISM: Primary | ICD-10-CM

## 2020-07-01 DIAGNOSIS — Z76.89 HEALTH CARE HOME: ICD-10-CM

## 2020-07-01 PROBLEM — K40.30 UNILATERAL INGUINAL HERNIA WITH OBSTRUCTION AND WITHOUT GANGRENE: Status: ACTIVE | Noted: 2018-09-04

## 2020-07-01 PROBLEM — N52.9 IMPOTENCE OF ORGANIC ORIGIN: Status: ACTIVE | Noted: 2017-02-01

## 2020-07-01 PROBLEM — H26.9 CATARACT OF BOTH EYES: Status: ACTIVE | Noted: 2018-08-31

## 2020-07-01 LAB
ERYTHROCYTE [DISTWIDTH] IN BLOOD BY AUTOMATED COUNT: 13.9 %
HCT VFR BLD AUTO: 39.1 % (ref 40–53)
HEMOGLOBIN: 12.7 G/DL (ref 13.3–17.7)
MCH RBC QN AUTO: 31.7 PG (ref 26–33)
MCHC RBC AUTO-ENTMCNC: 32.5 G/DL (ref 31–36)
MCV RBC AUTO: 97.4 FL (ref 78–100)
PLATELET COUNT - QUEST: 161 10^9/L (ref 150–375)
RBC # BLD AUTO: 4.01 10*12/L (ref 4.4–5.9)
WBC # BLD AUTO: 10.5 10*9/L (ref 4–11)

## 2020-07-01 PROCEDURE — 85027 COMPLETE CBC AUTOMATED: CPT | Performed by: FAMILY MEDICINE

## 2020-07-01 PROCEDURE — 71046 X-RAY EXAM CHEST 2 VIEWS: CPT | Performed by: FAMILY MEDICINE

## 2020-07-01 PROCEDURE — 99213 OFFICE O/P EST LOW 20 MIN: CPT | Performed by: FAMILY MEDICINE

## 2020-07-01 PROCEDURE — 36415 COLL VENOUS BLD VENIPUNCTURE: CPT | Performed by: FAMILY MEDICINE

## 2020-07-01 SDOH — HEALTH STABILITY: MENTAL HEALTH: HOW OFTEN DO YOU HAVE A DRINK CONTAINING ALCOHOL?: MONTHLY OR LESS

## 2020-07-01 SDOH — HEALTH STABILITY: MENTAL HEALTH: HOW MANY DRINKS CONTAINING ALCOHOL DO YOU HAVE ON A TYPICAL DAY WHEN YOU ARE DRINKING?: 1 OR 2

## 2020-07-01 NOTE — PATIENT INSTRUCTIONS

## 2020-07-01 NOTE — PROGRESS NOTES
Subjective     William Ba is a 85 year old male who presents to clinic today for the following health issues:    HPI     Hospital Follow-up Visit:    Hospital/Nursing Home/IP Rehab Facility: [facility]  Date of Admission: 06/10/2020 and 6/13/2020  Date of Discharge: 06/12/2020 and 06/18/2020  Reason(s) for Admission: Fall/pneumothorax/fever      Was your hospitalization related to COVID-19? No   Problems taking medications regularly:  None  Medication changes since discharge: None  Problems adhering to non-medication therapy:  None    Summary of hospitalization:  [facility] discharge summary reviewed  Diagnostic Tests/Treatments reviewed.  Follow up needed: cxr  Other Healthcare Providers Involved in Patient s Care:         None  Update since discharge: improved. Post Discharge Medication Reconciliation: discharge medications reconciled, continue medications without change.  Plan of care communicated with patient and family          Pneumothorax having no pain    Patient Active Problem List   Diagnosis     Atrial fibrillation/flutter (H)     COPD (chronic obstructive pulmonary disease)     Abdominal aortic aneurysm without rupture (H)     Acute CVA (cerebrovascular accident) (H)     Coronary atherosclerosis     Mixed hyperlipidemia     Pneumothorax, left     Closed fracture of multiple ribs of left side with routine healing     Community acquired pneumonia     Cataract of both eyes     Ischemic chest pain (H)     Contracture of palmar fascia     Dysphonia     Family history of prostate cancer     Hematuria, unspecified     Hiatal hernia     History of brachytherapy     History of hepatitis     Impotence of organic origin     Malignant neoplasm of prostate (H)     Microscopic hematuria     Presbylarynx     ACP (advance care planning)     Schatzki's ring     Stiffness of joints, not elsewhere classified, multiple sites     Unilateral inguinal hernia with obstruction and without gangrene      Vitamin D deficiency     Health Care Home     No past surgical history on file.    Social History     Tobacco Use     Smoking status: Former Smoker     Packs/day: 1.00     Last attempt to quit:      Years since quittin.5     Smokeless tobacco: Never Used   Substance Use Topics     Alcohol use: Yes     Frequency: Monthly or less     Drinks per session: 1 or 2     Family History   Problem Relation Age of Onset     Abdominal Aortic Aneurysm Father          Current Outpatient Medications   Medication Sig Dispense Refill     acetaminophen (TYLENOL) 500 MG tablet Take 2 tablets (1,000 mg) by mouth every 8 hours 100 tablet 0     aspirin 81 MG EC tablet Take 81 mg by mouth daily        atorvastatin (LIPITOR) 40 MG tablet Take 40 mg by mouth At Bedtime        docusate sodium (COLACE) 100 MG capsule Take 100 mg by mouth every evening        famotidine (PEPCID) 10 MG tablet Take 10 mg by mouth every evening       fluticasone-vilanterol (BREO ELLIPTA) 100-25 MCG/INH inhaler Inhale 1 puff into the lungs       metoprolol succinate ER (TOPROL-XL) 50 MG 24 hr tablet Take 1 tablet (50 mg) by mouth daily 90 tablet 3     Misc Natural Products (OSTEO BI-FLEX ADV TRIPLE ST) TABS Take 1 capsule by mouth 2 times daily        Multiple Vitamins-Minerals (MULTIVITAMIN ADULT PO) Take one(1) tablet daily.       rivaroxaban ANTICOAGULANT (XARELTO) 20 MG TABS tablet Take 20 mg by mouth daily (with dinner)       Saw [place] 160 MG CAPS Take 1 capsule by mouth daily        Allergies   Allergen Reactions     Bee Swelling     Methylprednisolone Other (See Comments)     IV steroids causes confusion. PO steroids he tolerates with no issues         Reviewed and updated as needed this visit by Provider         Review of Systems   Constitutional, HEENT, cardiovascular, pulmonary, gi and gu systems are negative, except as otherwise noted.      Objective    There were no vitals taken for this visit.  There is no height or weight on file to  calculate BMI.  Physical Exam   GENERAL: healthy, alert and no distress  NECK: no adenopathy, no asymmetry, masses, or scars and thyroid normal to palpation  RESP: lungs clear to auscultation - no rales, rhonchi or wheezes  CV: regular rate and rhythm, normal S1 S2, no S3 or S4, no murmur, click or rub, no peripheral edema and peripheral pulses strong  ABDOMEN: soft, nontender, no hepatosplenomegaly, no masses and bowel sounds normal  MS: no gross musculoskeletal defects noted, no edema  LYMPH: no cervical, supraclavicular, axillary, or inguinal adenopathy    Diagnostic Test Results:  Labs reviewed in Epic  Results for orders placed or performed in visit on 07/01/20 (from the past 24 hour(s))   HEMOGRAM/PLATELET   Result Value Ref Range    WBC 10.5 4.0 - 11 10*9/L    RBC Count 4.01 (A) 4.4 - 5.9 10*12/L    Hemoglobin 12.7 (A) 13.3 - 17.7 g/dL    Hematocrit 39.1 (A) 40.0 - 53.0 %    MCV 97.4 78 - 100 fL    MCH 31.7 26 - 33 pg    MCHC 32.5 31 - 36 g/dL    RDW 13.9 %    Platelet Count 161 150 - 375 10^9/L           Assessment & Plan     1. Health Care Home      2. Pneumonia of left lower lobe due to infectious organism  Appears resolved  - HEMOGRAM/PLATELET  - HC XRAY CHEST, 2 VIEWS   F/U cXR results when available    MEDICATIONS:  Continue current medications without change  Regular exercise    No follow-ups on file.    Adryan James MD  [facility]

## 2020-07-14 ENCOUNTER — TRANSFERRED RECORDS (OUTPATIENT)
Dept: FAMILY MEDICINE | Facility: CLINIC | Age: 85
End: 2020-07-14

## 2020-07-21 ENCOUNTER — TELEPHONE (OUTPATIENT)
Dept: FAMILY MEDICINE | Facility: CLINIC | Age: 85
End: 2020-07-21

## 2020-07-21 NOTE — TELEPHONE ENCOUNTER
This is an extensive form with questions that I do not know the answer to. Either pt needs to make an appt or if you want to call and fill out the questions we can also do this   Need copy of med list and last visit Dr LI

## 2020-08-11 ENCOUNTER — DOCUMENTATION ONLY (OUTPATIENT)
Dept: FAMILY MEDICINE | Facility: CLINIC | Age: 85
End: 2020-08-11

## 2020-08-11 NOTE — PROGRESS NOTES
Dear Dr. Doran,    Medicare Home Health regulations requires [facility] to notify the Physician when the plan for visits has been altered.    We have provided fewer visits than ordered.  We are notifying you of a Missed Visit.    Williambright Ba; MRN [id number]  Missed Visit  Is 8/5  Dates of missed services  OT RV  Reason: Patient cancelled   Sincerely [facility]  Flor Chinchilla  [phone]

## 2020-08-14 ENCOUNTER — TELEPHONE (OUTPATIENT)
Dept: FAMILY MEDICINE | Facility: CLINIC | Age: 85
End: 2020-08-14

## 2020-08-14 NOTE — TELEPHONE ENCOUNTER
Received a fax from [place] [facility] for a PA for the diclofenac gel. Was prescribed after a fall in June. Had 3 refills.   Rx says D/C'd. Pt was here on 7/1/20 with no pain.    Sent back to pharmacy that we have it discontinued, is Pt requesting, we did not prescribe and if Pt is needing will need OV as he had not pain in July.

## 2020-08-28 ENCOUNTER — HOSPITAL ENCOUNTER (EMERGENCY)
Facility: CLINIC | Age: 85
Discharge: HOME | End: 2020-08-28
Attending: INTERNAL MEDICINE | Admitting: INTERNAL MEDICINE
Payer: MEDICARE

## 2020-08-28 ENCOUNTER — APPOINTMENT (OUTPATIENT)
Dept: ULTRASOUND IMAGING | Facility: CLINIC | Age: 85
End: 2020-08-28
Attending: INTERNAL MEDICINE
Payer: MEDICARE

## 2020-08-28 VITALS
TEMPERATURE: 98.4 F | RESPIRATION RATE: 20 BRPM | DIASTOLIC BLOOD PRESSURE: 94 MMHG | HEART RATE: 64 BPM | SYSTOLIC BLOOD PRESSURE: 142 MMHG | OXYGEN SATURATION: 95 %

## 2020-08-28 DIAGNOSIS — L03.115 CELLULITIS OF RIGHT LOWER EXTREMITY: ICD-10-CM

## 2020-08-28 LAB
ANION GAP SERPL CALCULATED.3IONS-SCNC: 4 MMOL/L (ref 3–14)
BASOPHILS # BLD AUTO: 0 10E9/L (ref 0–0.2)
BASOPHILS NFR BLD AUTO: 0.2 %
BUN SERPL-MCNC: 20 MG/DL (ref 7–30)
CALCIUM SERPL-MCNC: 8.7 MG/DL (ref 8.5–10.1)
CHLORIDE SERPL-SCNC: 110 MMOL/L (ref 94–109)
CO2 SERPL-SCNC: 27 MMOL/L (ref 20–32)
CREAT SERPL-MCNC: 0.87 MG/DL (ref 0.66–1.25)
DIFFERENTIAL METHOD BLD: ABNORMAL
EOSINOPHIL # BLD AUTO: 0.1 10E9/L (ref 0–0.7)
EOSINOPHIL NFR BLD AUTO: 1.4 %
ERYTHROCYTE [DISTWIDTH] IN BLOOD BY AUTOMATED COUNT: 13.7 % (ref 10–15)
GFR SERPL CREATININE-BSD FRML MDRD: 79 ML/MIN/{1.73_M2}
GLUCOSE SERPL-MCNC: 87 MG/DL (ref 70–99)
HCT VFR BLD AUTO: 39.4 % (ref 40–53)
HGB BLD-MCNC: 12.5 G/DL (ref 13.3–17.7)
IMM GRANULOCYTES # BLD: 0 10E9/L (ref 0–0.4)
IMM GRANULOCYTES NFR BLD: 0.3 %
LYMPHOCYTES # BLD AUTO: 2.4 10E9/L (ref 0.8–5.3)
LYMPHOCYTES NFR BLD AUTO: 27.4 %
MCH RBC QN AUTO: 31.9 PG (ref 26.5–33)
MCHC RBC AUTO-ENTMCNC: 31.7 G/DL (ref 31.5–36.5)
MCV RBC AUTO: 101 FL (ref 78–100)
MONOCYTES # BLD AUTO: 0.8 10E9/L (ref 0–1.3)
MONOCYTES NFR BLD AUTO: 8.9 %
NEUTROPHILS # BLD AUTO: 5.4 10E9/L (ref 1.6–8.3)
NEUTROPHILS NFR BLD AUTO: 61.8 %
NRBC # BLD AUTO: 0 10*3/UL
NRBC BLD AUTO-RTO: 0 /100
PLATELET # BLD AUTO: 127 10E9/L (ref 150–450)
POTASSIUM SERPL-SCNC: 4.3 MMOL/L (ref 3.4–5.3)
RBC # BLD AUTO: 3.92 10E12/L (ref 4.4–5.9)
SODIUM SERPL-SCNC: 141 MMOL/L (ref 133–144)
WBC # BLD AUTO: 8.7 10E9/L (ref 4–11)

## 2020-08-28 PROCEDURE — 85025 COMPLETE CBC W/AUTO DIFF WBC: CPT | Performed by: INTERNAL MEDICINE

## 2020-08-28 PROCEDURE — 80048 BASIC METABOLIC PNL TOTAL CA: CPT | Performed by: INTERNAL MEDICINE

## 2020-08-28 PROCEDURE — 25000128 H RX IP 250 OP 636: Performed by: INTERNAL MEDICINE

## 2020-08-28 PROCEDURE — 93971 EXTREMITY STUDY: CPT | Mod: RT

## 2020-08-28 PROCEDURE — 96365 THER/PROPH/DIAG IV INF INIT: CPT

## 2020-08-28 PROCEDURE — 96366 THER/PROPH/DIAG IV INF ADDON: CPT

## 2020-08-28 PROCEDURE — 99284 EMERGENCY DEPT VISIT MOD MDM: CPT | Mod: 25

## 2020-08-28 RX ORDER — CEPHALEXIN 500 MG/1
500 CAPSULE ORAL 4 TIMES DAILY
Qty: 40 CAPSULE | Refills: 0 | Status: SHIPPED | OUTPATIENT
Start: 2020-08-28 | End: 2020-09-07

## 2020-08-28 RX ORDER — CEFAZOLIN SODIUM 2 G/100ML
2 INJECTION, SOLUTION INTRAVENOUS ONCE
Status: COMPLETED | OUTPATIENT
Start: 2020-08-28 | End: 2020-08-28

## 2020-08-28 RX ADMIN — CEFAZOLIN SODIUM 2 G: 2 INJECTION, SOLUTION INTRAVENOUS at 16:47

## 2020-08-28 ASSESSMENT — ENCOUNTER SYMPTOMS
FEVER: 0
COLOR CHANGE: 1
WEAKNESS: 0

## 2020-08-28 NOTE — ED NOTES
Assuming care of pt. PT resting on cart. Resp even and unlabored. Skin pink, warm, and dry. Redness noted to right lower extremity. Awaiting US. Updated on plan of care

## 2020-08-28 NOTE — DISCHARGE INSTRUCTIONS
Discharge Instructions  Cellulitis    Cellulitis is an infection of the skin that occurs when bacteria enter the skin.   Symptoms are generally redness, swelling, warmth and pain.  Your infection appeared to be appropriate to treat at home with antibiotics.  However, sometimes your infection may be worse than it seemed at first, or may worsen with time. If you have new or worse symptoms, you may need to be seen again in the Emergency Department or by your primary doctor.    Return to the Emergency Department if:  The redness, pain, or swelling gets a lot worse.  If the red area was marked, return if it is red beyond the marked area.  You are unable to get your antibiotics, or are vomiting them up or you can t take them.  You are feeling more ill, weak or lightheaded.  You start to run a new fever (temperature >101).  Anything else about the infection worries or concerns you.  Treatment:  Start your antibiotics right away, and take them as prescribed. Be sure to finish the whole prescription, even if you are better.  Apply a heating pad, warm packs, or warm water soaks to the infected area for 15 minutes at a time, at least 3 times a day. Do not use a heating pad on your feet or legs if you have diabetes. Do not sleep with a heating pad on, since this can cause burns or skin injury.  Rest your injured area for at least 1-2 days. After that you may start using your extremity again as long as there is not too much pain.   Raise the injured area above the level of your heart as much as possible in the first 1-2 days.  Tylenol  (acetaminophen), Motrin  (ibuprofen), or Advil  (ibuprofen) may help may help reduce pain and fever and may help you feel more comfortable. Be sure to read and follow the package directions, and ask your doctor if you have questions.    Follow-up with your doctor:  Re-check in clinic within 2-3 days.  Probiotics: If you have been given an antibiotic, you may want to also take a probiotic pill or  "eat yogurt with live cultures. Probiotics have \"good bacteria\" to help your intestines stay healthy. Studies have shown that probiotics help prevent diarrhea and other intestine problems (including C. diff infection) when you take antibiotics. You can buy these without a prescription in the pharmacy section of the store.     If you were given a prescription for medicine here today, be sure to read all of the information (including the package insert) that comes with your prescription.  This will include important information about the medicine, its side effects, and any warnings that you need to know about.  The pharmacist who fills the prescription can provide more information and answer questions you may have about the medicine.  If you have questions or concerns that the pharmacist cannot address, please call or return to the Emergency Department.     Opioid Medication Information    Pain medications are among the most commonly prescribed medicines, so we are including this information for all our patients. If you did not receive pain medication or get a prescription for pain medicine, you can ignore it.     You may have been given a prescription for an opioid (narcotic) pain medicine and/or have received a pain medicine while here in the Emergency Department. These medicines can make you drowsy or impaired. You must not drive, operate dangerous equipment, or engage in any other dangerous activities while taking these medications. If you drive while taking these medications, you could be arrested for DUI, or driving under the influence. Do not drink any alcohol while you are taking these medications.     Opioid pain medications can cause addiction. If you have a history of chemical dependency of any type, you are at a higher risk of becoming addicted to pain medications.  Only take these prescribed medications to treat your pain when all other options have been tried. Take it for as short a time and as few doses " as possible. Store your pain pills in a secure place, as they are frequently stolen and provide a dangerous opportunity for children or visitors in your house to start abusing these powerful medications. We will not replace any lost or stolen medicine.  As soon as your pain is better, you should flush all your remaining medication.     Many prescription pain medications contain Tylenol  (acetaminophen), including Vicodin , Tylenol #3 , Norco , Lortab , and Percocet .  You should not take any extra pills of Tylenol  if you are using these prescription medications or you can get very sick.  Do not ever take more than 3000 mg of acetaminophen in any 24 hour period.    All opioids tend to cause constipation. Drink plenty of water and eat foods that have a lot of fiber, such as fruits, vegetables, prune juice, apple juice and high fiber cereal.  Take a laxative if you don t move your bowels at least every other day. Miralax , Milk of Magnesia, Colace , or Senna  can be used to keep you regular.      Remember that you can always come back to the Emergency Department if you are not able to see your regular doctor in the amount of time listed above, if you get any new symptoms, or if there is anything that worries you.

## 2020-08-28 NOTE — ED AVS SNAPSHOT
[facility] Emergency Department  [address]  [facility] [address]  Phone:  [phone]  Fax:  [phone]                                    William Ba   MRN: [id number]    Department:  [facility] Emergency Department   Date of Visit:  8/28/2020           After Visit Summary Signature Page    I have received my discharge instructions, and my questions have been answered. I have discussed any challenges I see with this plan with the nurse or doctor.    ..........................................................................................................................................  Patient/Patient Representative Signature      ..........................................................................................................................................  Patient Representative Print Name and Relationship to Patient    ..................................................               ................................................  Date                                   Time    ..........................................................................................................................................  Reviewed by Signature/Title    ...................................................              ..............................................  Date                                               Time          22EPIC Rev 08/18

## 2020-08-28 NOTE — ED PROVIDER NOTES
History     Chief Complaint:  Skin Redness    HPI   William Ba is a 85 year old male who presents with skin redness. The patient and his daughter report that today he called her for the development of right lower leg pain with increasing redness and warmth. They called his primary care but was recommended to be brought to the emergency department for his symptoms. He denies weakness, fever, or other symptoms, of note, the patient is tested every other weak for COVID and these have all returned negative and there has not been any positive cases in his senior living facility.     Allergies:  Methylprednisolone      Medications:    aspirin 81   atorvastatin   docusate sodium   famotidine  fluticasone-vilanterol inhaler  metoprolol succinate ER   Xarelto     Past Medical History:    Abdominal aortic aneurysm without rupture   Cerebrovascular accident  Atrial fibrillation/flutter    Malignant neoplasm of prostate  Cataracts   Presbylarynx  Hernia   Hematuria   Rib fractures   Herpes zoster  Coronary atherosclerosis  Dysphonia  COPD  Coronary atherosclerosis   Mixed hyperlipidemia   Hiatal hernia  pneumonia  Pneumothorax  Schatzki's ring  Contracture of palmar fascia    Past Surgical History:    Colonoscopy   Knee replacement   EGD  Hernia repair     Family History:    Father: AAA     Social History:  The patient was accompanied to the ED by daughter.  Smoking Status: Former Smoker  Smokeless Tobacco: Never Used  Alcohol Use: Positive  Drug Use: Negative  PCP: Adryan James   Marital Status:        Review of Systems   Constitutional: Negative for fever.   Musculoskeletal:        Leg tenderness to right   Skin: Positive for color change (redness).        Warmth to right lower leg   Neurological: Negative for weakness.   All other systems reviewed and are negative.      Physical Exam     Patient Vitals for the past 24 hrs:   BP Temp Temp src Pulse Resp SpO2   08/28/20 1804 -- -- -- -- -- 95 %   08/28/20  1803 (!) 142/94 -- -- 64 -- --   08/28/20 1715 -- -- -- -- -- 94 %   08/28/20 1700 119/79 -- -- -- -- --   08/28/20 1553 111/80 98.4  F (36.9  C) Oral 72 20 100 %      Physical Exam  Constitutional:       Comments: Pleasant and cooperative   HENT:      Right Ear: Tympanic membrane normal.      Left Ear: Tympanic membrane normal.      Mouth/Throat:      Pharynx: No posterior oropharyngeal erythema.   Eyes:      Conjunctiva/sclera: Conjunctivae normal.   Neck:      Musculoskeletal: Neck supple.   Cardiovascular:      Rate and Rhythm: Normal rate and regular rhythm.      Heart sounds: Normal heart sounds.   Pulmonary:      Effort: Pulmonary effort is normal.      Breath sounds: Normal breath sounds.   Abdominal:      General: Bowel sounds are normal. There is no distension.      Palpations: Abdomen is soft.      Tenderness: There is no abdominal tenderness. There is no guarding or rebound.   Musculoskeletal: Normal range of motion.      Comments: Right lower leg with mild swelling, erythema, warmth.   Skin:     General: Skin is warm and dry.   Neurological:      Mental Status: He is alert.           Emergency Department Course     Imaging:  Radiology findings were communicated with the patient who voiced understanding of the findings.    US Lower Extremity Venous Duplex Right  1.  No deep venous thrombosis in the right lower extremity.  Reading per radiology     Laboratory:  Laboratory findings were communicated with the patient who voiced understanding of the findings.    CBC: WBC 8.7, HGB 12.5 (L),  (L)  BMP: Chloride 110 (H), o/w WNL (Creatinine 0.87)    Interventions:  1647 Ancef 2 g IV     Emergency Department Course:    1626 Nursing notes and vitals reviewed. I performed an exam of the patient as documented above.     1649 IV was inserted and blood was drawn for laboratory testing, results above.     1747 The patient was sent for a US while in the emergency department, results above.     1801 Patient  rechecked and updated.     1810 Prior to discharge, I personally reviewed the results with the patient and all related questions were answered. The patient verbalized understanding and is amenable to plan.     Impression & Plan      Medical Decision Making:  William Ba is a 85 year old male who presents to the emergency department today for evaluation of redness and swelling of the right lower leg.  This is consistent with cellulitis.  There was concern given his recent pneumonia so we have treated aggressively with IV Ancef.  He is afebrile without systemic symptoms.  Evaluation here shows no leukocytosis or electrolyte abnormality.  Ultrasound shows no evidence of DVT.  I think he is appropriate for outpatient management.  I have discharged him with his daughter.  He will continue Keflex for 10-day course, elevate and warm compresses, return if worse or systemic symptoms.    Diagnosis:    ICD-10-CM    1. Cellulitis of right lower extremity  L03.115      Disposition:   The patient is discharged to home.     Discharge Medications:  New Prescriptions    CEPHALEXIN (KEFLEX) 500 MG CAPSULE    Take 1 capsule (500 mg) by mouth 4 times daily for 10 days     Scribe Disclosure:  I, Orla Severson, am serving as a scribe at 4:26 PM on 8/28/2020 to document services personally performed by Milena Ortiz MD based on my observations and the provider's statements to me.   [facility] EMERGENCY DEPARTMENT       Milena Ortiz MD  08/28/20 1804

## 2020-08-28 NOTE — ED TRIAGE NOTES
Right lower extremity with a small (golf ball size) area of redness for the past couple days.  Leg warm to touch

## 2020-08-31 ENCOUNTER — OFFICE VISIT (OUTPATIENT)
Dept: FAMILY MEDICINE | Facility: CLINIC | Age: 85
End: 2020-08-31

## 2020-08-31 VITALS
DIASTOLIC BLOOD PRESSURE: 70 MMHG | HEIGHT: 74 IN | BODY MASS INDEX: 21.82 KG/M2 | SYSTOLIC BLOOD PRESSURE: 118 MMHG | WEIGHT: 170 LBS | TEMPERATURE: 98.1 F | HEART RATE: 67 BPM | OXYGEN SATURATION: 98 %

## 2020-08-31 DIAGNOSIS — L03.115 CELLULITIS OF RIGHT LOWER EXTREMITY: Primary | ICD-10-CM

## 2020-08-31 PROCEDURE — 99213 OFFICE O/P EST LOW 20 MIN: CPT | Performed by: PHYSICIAN ASSISTANT

## 2020-08-31 ASSESSMENT — MIFFLIN-ST. JEOR: SCORE: 1530.86

## 2020-08-31 NOTE — PROGRESS NOTES
"CC: ER f/u, cellulitis    History:  William is here today with daughter. He was seen in ER 8/28/2020 for skin redness and pain on right lower leg. Reviewed this note in [facility]. He had US in ER that was negative. Was given IV Ancef in the ER, and discharged with 10 day course of Keflex. He is tolerating this well. Pain is improving, but the appearance is improving more slowly. No chest pain, new SOB, fever, sweats, chills. They have been doing frequent warm compresses, keeping leg elevated, and using bismuth ointment dressings. She is going to his nursing home to change dressings daily.     PMH, MEDICATIONS, ALLERGIES, SOCIAL AND FAMILY HISTORY in [facility] and reviewed by me personally.    ROS negative other than the symptoms noted above in the HPI.    Examination   /70   Pulse 67   Temp 98.1  F (36.7  C) (Temporal)   Ht 1.88 m (6' 2\")   Wt 77.1 kg (170 lb)   SpO2 98%   BMI 21.83 kg/m       Constitutional: Sitting comfortably, in no acute distress. Vital signs noted  Neck:  no adenopathy, trachea midline and normal to palpationno adenopathy, trachea midline and normal to palpation, thyroid normal to palpation  Cardiovascular:  regular rate and rhythm, no murmurs, clicks, or gallops  Respiratory:  normal respiratory rate and rhythm, lungs clear to auscultation  SKIN: No jaundice/pallor. Erythematous plaque 4 cm in diameter, with central bulla formed. No drainage. Mildly tender to palpation.   Psychiatric: mentation appears normal and affect normal/bright    Procedure:  Cleansed lesion with alcohol swab. Used 11 blade to make small incision. Clear fluid expressed, and collected on culture swab.     A/P    ICD-10-CM    1. Cellulitis of right lower extremity  L03.115 CULTURE, AEROBIC       DISCUSSION:  Photo from yesterday and day before compared to today do show improvement. Did collect culture today, and will contact daughter Erin with results when available. Okay to continue on current treatment plan. Would " write note if needed to allow Erin to continue daily visits for dressing changes.     follow up visit: As needed    Maria Fernanda Ontiveros PA-C  [facility]

## 2020-09-03 LAB — RESULT - QUEST: NORMAL

## 2020-09-23 ENCOUNTER — OFFICE VISIT (OUTPATIENT)
Dept: FAMILY MEDICINE | Facility: CLINIC | Age: 85
End: 2020-09-23

## 2020-09-23 VITALS
HEART RATE: 82 BPM | OXYGEN SATURATION: 98 % | DIASTOLIC BLOOD PRESSURE: 52 MMHG | WEIGHT: 170.2 LBS | SYSTOLIC BLOOD PRESSURE: 90 MMHG | BODY MASS INDEX: 23.83 KG/M2 | TEMPERATURE: 98.4 F | HEIGHT: 71 IN

## 2020-09-23 DIAGNOSIS — I48.92 ATRIAL FIBRILLATION/FLUTTER (H): Primary | ICD-10-CM

## 2020-09-23 DIAGNOSIS — J43.9 PULMONARY EMPHYSEMA, UNSPECIFIED EMPHYSEMA TYPE (H): ICD-10-CM

## 2020-09-23 DIAGNOSIS — E78.2 MIXED HYPERLIPIDEMIA: ICD-10-CM

## 2020-09-23 DIAGNOSIS — I48.91 ATRIAL FIBRILLATION/FLUTTER (H): Primary | ICD-10-CM

## 2020-09-23 LAB
BUN SERPL-MCNC: 16 MG/DL (ref 7–25)
BUN/CREATININE RATIO: 17.6 (ref 6–22)
CALCIUM SERPL-MCNC: 9.3 MG/DL (ref 8.6–10.3)
CHLORIDE SERPLBLD-SCNC: 107.7 MMOL/L (ref 98–110)
CHOLEST SERPL-MCNC: 104 MG/DL (ref 0–199)
CHOLEST/HDLC SERPL: 2 {RATIO} (ref 0–5)
CO2 SERPL-SCNC: 29.6 MMOL/L (ref 20–32)
CREAT SERPL-MCNC: 0.91 MG/DL (ref 0.7–1.18)
GLUCOSE SERPL-MCNC: 94 MG/DL (ref 60–99)
HDLC SERPL-MCNC: 51 MG/DL (ref 40–150)
HEMOGLOBIN: 13.5 G/DL (ref 13.3–17.7)
LDLC SERPL CALC-MCNC: 43 MG/DL (ref 0–130)
POTASSIUM SERPL-SCNC: 4.72 MMOL/L (ref 3.5–5.3)
SODIUM SERPL-SCNC: 142 MMOL/L (ref 135–146)
TRIGL SERPL-MCNC: 48 MG/DL (ref 0–149)

## 2020-09-23 PROCEDURE — 36415 COLL VENOUS BLD VENIPUNCTURE: CPT | Performed by: FAMILY MEDICINE

## 2020-09-23 PROCEDURE — 99214 OFFICE O/P EST MOD 30 MIN: CPT | Performed by: FAMILY MEDICINE

## 2020-09-23 PROCEDURE — 80061 LIPID PANEL: CPT | Performed by: FAMILY MEDICINE

## 2020-09-23 PROCEDURE — 80048 BASIC METABOLIC PNL TOTAL CA: CPT | Performed by: FAMILY MEDICINE

## 2020-09-23 PROCEDURE — 85018 HEMOGLOBIN: CPT | Performed by: FAMILY MEDICINE

## 2020-09-23 RX ORDER — ATORVASTATIN CALCIUM 40 MG/1
40 TABLET, FILM COATED ORAL AT BEDTIME
Qty: 90 TABLET | Refills: 3 | Status: ON HOLD | OUTPATIENT
Start: 2020-09-23 | End: 2023-01-01

## 2020-09-23 RX ORDER — METOPROLOL SUCCINATE 50 MG/1
50 TABLET, EXTENDED RELEASE ORAL DAILY
Qty: 90 TABLET | Refills: 3 | Status: SHIPPED | OUTPATIENT
Start: 2020-09-23 | End: 2023-01-01

## 2020-09-23 ASSESSMENT — MIFFLIN-ST. JEOR: SCORE: 1476.21

## 2020-09-23 NOTE — PROGRESS NOTES
Subjective     William Ba is a 85 year old male who presents to clinic today for the following health issues:    HPI       Hyperlipidemia Follow-Up      Are you regularly taking any medication or supplement to lower your cholesterol?   Yes- lipitor    Are you having muscle aches or other side effects that you think could be caused by your cholesterol lowering medication?  No    Hypertension Follow-up      Do you check your blood pressure regularly outside of the clinic? Yes     Are you following a low salt diet? No    Are your blood pressures ever more than 140 on the top number (systolic) OR more   than 90 on the bottom number (diastolic), for example 140/90? No    COPD Follow-Up    Overall, how are your COPD symptoms since your last clinic visit?  Better    How much fatigue or shortness of breath do you have when you are walking?  Less than usual    How much shortness of breath do you have when you are resting?  None    How often do you cough? Rarely    Have you noticed any change in your sputum/phlegm?  No    Have you experienced a recent fever? No    Please describe how far you can walk without stopping to rest:  2-5 blocks    How many flights of stairs are you able to walk up without stopping?  1    Have you had any Emergency Room Visits, Urgent Care Visits, or Hospital Admissions because of your COPD since your last office visit?  No    History   Smoking Status     Former Smoker     Packs/day: 1.00     Quit date: 2002   Smokeless Tobacco     Never Used     No results found for: FEV1, [id number]      How many servings of fruits and vegetables do you eat daily?  2-3    On average, how many sweetened beverages do you drink each day (Examples: soda, juice, sweet tea, etc.  Do NOT count diet or artificially sweetened beverages)?   1    How many days per week do you exercise enough to make your heart beat faster? 5    How many minutes a day do you exercise enough to make your heart beat faster? 20 - 29    How many  "days per week do you miss taking your medication? 0    Recent leg infection resolved    Review of Systems   Constitutional, HEENT, cardiovascular, pulmonary, GI, , musculoskeletal, neuro, skin, endocrine and psych systems are negative, except as otherwise noted.      Objective    BP 90/52 (BP Location: Right arm, Patient Position: Sitting, Cuff Size: Adult Large)   Pulse 82   Temp 98.4  F (36.9  C) (Oral)   Ht 1.791 m (5' 10.5\")   Wt 77.2 kg (170 lb 3.2 oz)   SpO2 98%   BMI 24.08 kg/m    Body mass index is 24.08 kg/m .  Physical Exam   GENERAL: healthy, alert and no distress  HENT: ear canals and TM's normal, nose and mouth without ulcers or lesions  HENT: normal cephalic/atraumatic, right ear: occluded with wax, left ear: normal: no effusions, no erythema, normal landmarks, nose and mouth without ulcers or lesions, oropharynx clear and oral mucous membranes moist  NECK: no adenopathy, no asymmetry, masses, or scars and thyroid normal to palpation  RESP: lungs clear to auscultation - no rales, rhonchi or wheezes  CV: irregularly irregular rhythm, normal S1 S2, no S3 or S4, no murmur, click or rub, peripheral pulses strong and no peripheral edema  ABDOMEN: soft, nontender, no hepatosplenomegaly, no masses and bowel sounds normal  MS: no gross musculoskeletal defects noted, no edema  SKIN: no suspicious lesions or rashes  BACK: no CVA tenderness, no paralumbar tenderness  PSYCH: mentation appears normal, affect normal/bright  LYMPH: no cervical, supraclavicular, axillary, or inguinal adenopathy            (I48.91,  I48.92) Atrial fibrillation/flutter (H)  (primary encounter diagnosis)  Comment: doing well rate well controlled  Plan: metoprolol succinate ER (TOPROL-XL) 50 MG 24 hr        tablet, rivaroxaban ANTICOAGULANT (XARELTO) 20         MG TABS tablet, Basic Metabolic Panel (BFP),         HEMOGLOBIN, VENOUS COLLECTION            (J43.9) Pulmonary emphysema, unspecified emphysema type (H)  Comment: doing " well on inhaler  Plan: fluticasone-vilanterol (BREO ELLIPTA) 100-25         MCG/INH inhaler            (E78.2) Mixed hyperlipidemia  Comment:   Plan: atorvastatin (LIPITOR) 40 MG tablet, Lipid         Panel (BFP)        Recheck levels

## 2020-09-23 NOTE — NURSING NOTE
Zackary is here for fasting med check    Pre-visit Screening:  Immunizations:  up to date  Colonoscopy:  NA d/t age  Mammogram: NA  Asthma Action Test/Plan:  NA  PHQ9:  None  GAD7:  None  Questioned patient about current smoking habits Pt. quit smoking some time ago.  Ok to leave detailed message on voice mail for today's visit only Yes, phone # [phone]

## 2020-10-15 ENCOUNTER — TELEPHONE (OUTPATIENT)
Dept: FAMILY MEDICINE | Facility: CLINIC | Age: 85
End: 2020-10-15

## 2020-10-15 NOTE — TELEPHONE ENCOUNTER
Pt daughter Erin called saying she was doing the dressing changes daily but is not able to for 1 week because of exposure to possible COVID.   She needs an order faxed over to [facility] saying that they can change the bandage daily.    Fax to Anu Tejeda  [phone]    Daughter Erin's # [phone]

## 2020-10-22 ENCOUNTER — TELEPHONE (OUTPATIENT)
Dept: FAMILY MEDICINE | Facility: CLINIC | Age: 85
End: 2020-10-22

## 2020-12-22 DIAGNOSIS — T78.40XD ALLERGIC REACTION, SUBSEQUENT ENCOUNTER: Primary | ICD-10-CM

## 2020-12-22 NOTE — TELEPHONE ENCOUNTER
William is requesting a refill of an epi-pen to have on hand in case of a reaction-- he is allergic to bees and the flu shot. He is scheduled to get the Covid vaccine next week and I directed his daughter to speak with [facility] about that.    The epi pen has not been prescribed by Dr. James since Zackary has moved to [place] from [place].    I don't know the correct epi pen to pend as an rx because it is not on his med list. Hoping that Dr. James can prescribe and fill in the missing quantities.  Thanks.

## 2020-12-23 RX ORDER — EPINEPHRINE 0.15 MG/.15ML
0.15 INJECTION SUBCUTANEOUS PRN
Qty: 1 EACH | Refills: 3 | Status: SHIPPED | OUTPATIENT
Start: 2020-12-23 | End: 2020-12-24 | Stop reason: ALTCHOICE

## 2020-12-24 RX ORDER — EPINEPHRINE 0.3 MG/.3ML
0.3 INJECTION SUBCUTANEOUS PRN
Qty: 1 EACH | Refills: 3 | Status: SHIPPED | OUTPATIENT
Start: 2020-12-24 | End: 2023-01-01

## 2020-12-24 NOTE — TELEPHONE ENCOUNTER
[facility] sent in Epi Pen jr on accident. They need a script sent in for an adult epi pen. Please resend for MUÑOZ.    William Ba is requesting a refill of:    Pending Prescriptions:                       Disp   Refills    EPINEPHrine (ANY BX GENERIC EQUIV) 0.3 MG*1 each 3            Sig: Inject 0.3 mLs (0.3 mg) into the muscle as needed           for anaphylaxis

## 2021-01-03 ENCOUNTER — HEALTH MAINTENANCE LETTER (OUTPATIENT)
Age: 86
End: 2021-01-03

## 2021-03-10 ENCOUNTER — TELEPHONE (OUTPATIENT)
Dept: FAMILY MEDICINE | Facility: CLINIC | Age: 86
End: 2021-03-10

## 2021-03-10 NOTE — TELEPHONE ENCOUNTER
Forms for [facility] came in. Last seen 9/23/20 by TONI. Pt needs OV as only seen once by [facility] for leg swelling.  Needs to establish care with new PCP

## 2021-06-14 ENCOUNTER — HOSPITAL ENCOUNTER (EMERGENCY)
Facility: CLINIC | Age: 86
Discharge: HOME | End: 2021-06-14
Attending: EMERGENCY MEDICINE | Admitting: EMERGENCY MEDICINE
Payer: MEDICARE

## 2021-06-14 ENCOUNTER — APPOINTMENT (OUTPATIENT)
Dept: GENERAL RADIOLOGY | Facility: CLINIC | Age: 86
End: 2021-06-14
Attending: EMERGENCY MEDICINE
Payer: MEDICARE

## 2021-06-14 VITALS
RESPIRATION RATE: 18 BRPM | DIASTOLIC BLOOD PRESSURE: 67 MMHG | HEART RATE: 81 BPM | SYSTOLIC BLOOD PRESSURE: 112 MMHG | OXYGEN SATURATION: 97 % | TEMPERATURE: 98 F

## 2021-06-14 DIAGNOSIS — R07.89 OTHER CHEST PAIN: ICD-10-CM

## 2021-06-14 DIAGNOSIS — I48.92 ATRIAL FLUTTER, UNSPECIFIED TYPE (H): ICD-10-CM

## 2021-06-14 LAB
ALBUMIN SERPL-MCNC: 3.5 G/DL (ref 3.4–5)
ALP SERPL-CCNC: 82 U/L (ref 40–150)
ALT SERPL W P-5'-P-CCNC: 31 U/L (ref 0–70)
ANION GAP SERPL CALCULATED.3IONS-SCNC: 3 MMOL/L (ref 3–14)
AST SERPL W P-5'-P-CCNC: 18 U/L (ref 0–45)
BASOPHILS # BLD AUTO: 0 10E9/L (ref 0–0.2)
BASOPHILS NFR BLD AUTO: 0.2 %
BILIRUB SERPL-MCNC: 1 MG/DL (ref 0.2–1.3)
BUN SERPL-MCNC: 18 MG/DL (ref 7–30)
CALCIUM SERPL-MCNC: 8.9 MG/DL (ref 8.5–10.1)
CHLORIDE SERPL-SCNC: 111 MMOL/L (ref 94–109)
CO2 SERPL-SCNC: 27 MMOL/L (ref 20–32)
CREAT SERPL-MCNC: 0.97 MG/DL (ref 0.66–1.25)
DIFFERENTIAL METHOD BLD: ABNORMAL
EOSINOPHIL # BLD AUTO: 0.1 10E9/L (ref 0–0.7)
EOSINOPHIL NFR BLD AUTO: 0.9 %
ERYTHROCYTE [DISTWIDTH] IN BLOOD BY AUTOMATED COUNT: 13.9 % (ref 10–15)
GFR SERPL CREATININE-BSD FRML MDRD: 71 ML/MIN/{1.73_M2}
GLUCOSE SERPL-MCNC: 90 MG/DL (ref 70–99)
HCT VFR BLD AUTO: 41.6 % (ref 40–53)
HGB BLD-MCNC: 13.9 G/DL (ref 13.3–17.7)
IMM GRANULOCYTES # BLD: 0.1 10E9/L (ref 0–0.4)
IMM GRANULOCYTES NFR BLD: 0.6 %
LIPASE SERPL-CCNC: 67 U/L (ref 73–393)
LYMPHOCYTES # BLD AUTO: 1.9 10E9/L (ref 0.8–5.3)
LYMPHOCYTES NFR BLD AUTO: 20.1 %
MCH RBC QN AUTO: 32.9 PG (ref 26.5–33)
MCHC RBC AUTO-ENTMCNC: 33.4 G/DL (ref 31.5–36.5)
MCV RBC AUTO: 99 FL (ref 78–100)
MONOCYTES # BLD AUTO: 0.7 10E9/L (ref 0–1.3)
MONOCYTES NFR BLD AUTO: 7.9 %
NEUTROPHILS # BLD AUTO: 6.5 10E9/L (ref 1.6–8.3)
NEUTROPHILS NFR BLD AUTO: 70.3 %
NRBC # BLD AUTO: 0 10*3/UL
NRBC BLD AUTO-RTO: 0 /100
PLATELET # BLD AUTO: 145 10E9/L (ref 150–450)
POTASSIUM SERPL-SCNC: 4.1 MMOL/L (ref 3.4–5.3)
PROT SERPL-MCNC: 6.4 G/DL (ref 6.8–8.8)
RBC # BLD AUTO: 4.22 10E12/L (ref 4.4–5.9)
SODIUM SERPL-SCNC: 141 MMOL/L (ref 133–144)
TROPONIN I SERPL-MCNC: <0.015 UG/L (ref 0–0.04)
WBC # BLD AUTO: 9.3 10E9/L (ref 4–11)

## 2021-06-14 PROCEDURE — 83690 ASSAY OF LIPASE: CPT | Performed by: EMERGENCY MEDICINE

## 2021-06-14 PROCEDURE — 250N000013 HC RX MED GY IP 250 OP 250 PS 637: Performed by: EMERGENCY MEDICINE

## 2021-06-14 PROCEDURE — 84484 ASSAY OF TROPONIN QUANT: CPT | Performed by: EMERGENCY MEDICINE

## 2021-06-14 PROCEDURE — 93005 ELECTROCARDIOGRAM TRACING: CPT

## 2021-06-14 PROCEDURE — 250N000012 HC RX MED GY IP 250 OP 636 PS 637: Performed by: EMERGENCY MEDICINE

## 2021-06-14 PROCEDURE — 71046 X-RAY EXAM CHEST 2 VIEWS: CPT

## 2021-06-14 PROCEDURE — 80053 COMPREHEN METABOLIC PANEL: CPT | Performed by: EMERGENCY MEDICINE

## 2021-06-14 PROCEDURE — 85025 COMPLETE CBC W/AUTO DIFF WBC: CPT | Performed by: EMERGENCY MEDICINE

## 2021-06-14 PROCEDURE — 99285 EMERGENCY DEPT VISIT HI MDM: CPT | Mod: 25

## 2021-06-14 RX ORDER — PREDNISONE 20 MG/1
20 TABLET ORAL ONCE
Status: DISCONTINUED | OUTPATIENT
Start: 2021-06-14 | End: 2021-06-14 | Stop reason: HOSPADM

## 2021-06-14 RX ORDER — ALBUTEROL SULFATE 90 UG/1
6 AEROSOL, METERED RESPIRATORY (INHALATION) ONCE
Status: COMPLETED | OUTPATIENT
Start: 2021-06-14 | End: 2021-06-14

## 2021-06-14 RX ORDER — ASPIRIN 81 MG/1
324 TABLET, CHEWABLE ORAL ONCE
Status: DISCONTINUED | OUTPATIENT
Start: 2021-06-14 | End: 2021-06-14 | Stop reason: HOSPADM

## 2021-06-14 RX ADMIN — ALBUTEROL SULFATE 6 PUFF: 90 AEROSOL, METERED RESPIRATORY (INHALATION) at 10:34

## 2021-06-14 ASSESSMENT — ENCOUNTER SYMPTOMS
CHEST TIGHTNESS: 1
SHORTNESS OF BREATH: 0
BACK PAIN: 0
COUGH: 1

## 2021-06-14 NOTE — ED TRIAGE NOTES
Pt had cough with midsternal CP. COPD history. Cough is at baseline but CP started with cough today.     324 ASA.

## 2021-06-14 NOTE — ED PROVIDER NOTES
History   Chief Complaint:  Chest Pain       The history is provided by the patient and a relative.      William Ba is a 86 year old male with history of COPD, atrial fibrillation, SVT, among others and anticoagulated on Xarelto, who presents with chest pain and coughing which started this morning when swallowing a pill. His daughter reports that she heard him coughing and asked him if the pill got stuck; he said he thought so. They called EMS as the chest pain was not going away, gave 324 mg of Aspirin. The patient describes his pain as a chest tightness that moves up and down the center of his chest which he has never experienced before. He denies having any pain at the present moment, back pain, shortness of breath, or any other symptoms. He is Covid vaccinated.     Review of Systems   Respiratory: Positive for cough and chest tightness (since resolved). Negative for shortness of breath.    Musculoskeletal: Negative for back pain.   All other systems reviewed and are negative.      Allergies:  Bees  Methylprednisolone    Medications:  Aspirin 81 mg  Lipitor  Epinephrine  Breo ellipta  Xarelto  Toprol  Colace  Pepcid    Past Medical History:    Abdominal aortic aneurysm without rupture  CVA, acute  Atrial fibrillation  COPD  Coronary atherosclerosis  Hyperlipidemia  Pneumonia  Closed fracture of multiple ribs of left side with routine healing  Pneumothorax, left  Unilateral inguinal hernia  Cataract, bilateral  Brachytherapy  Hepatitis  Dysphonia  Presbylarynx  Hiatal hernia  Schatzki's ring  Contracture of palmar fascia  Hypertension  GERD  Urinary calculus  Generalized osteoarthritis  Herpes zoster  Hyperplasia of prostate  CAD  Kidney stones  SVT  Malignant neoplasm of prostate    Past Surgical History:    Colonoscopy x3  EGD x2  Hernia repair  Total knee replacement    Family History:    Father: abdominal aortic aneurysm  Brother: colon cancer  Mother: heart disease  Son: hypertension,  hyperlipidemia  Sister: macular degeneration    Social History:  Patient presents to the ED with his daughter.   Patient arrived to the ED via EMS.  Former smoker    Physical Exam     Patient Vitals for the past 24 hrs:   BP Temp Temp src Pulse Resp SpO2   06/14/21 1215 112/67 -- -- 81 -- 97 %   06/14/21 1200 117/84 -- -- 68 -- 98 %   06/14/21 1145 124/79 -- -- 64 -- 99 %   06/14/21 1130 127/83 -- -- 64 -- 99 %   06/14/21 1045 123/87 -- -- -- -- 98 %   06/14/21 1030 105/70 -- -- -- -- 94 %   06/14/21 1015 117/74 -- -- 71 -- 99 %   06/14/21 1010 116/84 98  F (36.7  C) Oral 66 18 97 %       Physical Exam  General: The patient is alert, in no respiratory distress.    HENT: Mucous membranes moist.    Cardiovascular: Heart rate is irregularly irregular and tachycardic. Good pulses in all four extremities. Normal capillary refill and skin turgor.    Respiratory:  Lungs are not clear. There is wheezing and cough. No nasal flaring. No retractions.     Gastrointestinal: Abdomen soft. No guarding, no rebound. No palpable hernias. No abdominal tenderness.    Musculoskeletal: No gross deformity.     Skin: No rashes or petechiae.     Neurologic: The patient is alert. GCS 15. No testable cranial nerve deficit. Follows commands with clear and appropriate speech. Gives appropriate answers. Good strength in all extremities. No gross neurologic deficit. Gross sensation intact. Pupils are round and reactive. No meningismus.     Lymphatic: No cervical adenopathy. No lower extremity swelling.    Psychiatric: The patient is non-tearful.    Emergency Department Course   ECG  ECG taken at 1026, ECG read at 1027  Atrial flutter with variable AV block  Nonspecific ST abnormality  Abnormal ECG   Rate 73 bpm. AK interval * ms. QRS duration 90 ms. QT/QTc 376/414 ms. P-R-T axes 222 17 24.     Imaging:  XR Chest 2 Views  No acute cardiopulmonary disease.    Laboratory:    CBC: WBC 9.3, HGB 13.9,  (L)     CMP: Chloride 111 (H), Protein  total 6.4 (L) o/w WNL (Creatinine 0.97)     Troponin (Collected 1005): <0.015    Lipase: 67 (L)    Emergency Department Course:    Reviewed:  I reviewed nursing notes, vitals and care everywhere    Assessments:  1012 I obtained history and examined the patient as noted above.     1220 I rechecked the patient and explained findings.    Interventions:  1034 Albuterol 6 puff Inhalation    Disposition:  The patient was discharged to home.     Impression & Plan     Medical Decision Making:  The patient presented to the ER today due to chest pain but was unable to provide much history.  His daughter came who is a nurse and provided good story.  She reported that the patient has a history of COPD has had a mild cough and unfortunately coughed while swallowing his morning medications.  I think this caused a pill to be stuck likely secondary to his known Schatzki's ring.  Currently he is asymptomatic with no pain his screening troponin and EKG are reassuring.  I think likely that this is pill esophagitis he was able to swallow there is no signs of any obstruction.  I did consider causes such as dissection PE COPD exacerbation pneumothorax.  He was having no respiratory distress or significant coughing here.  At this point I felt comfortable letting the patient go home stressed that the pain should return if develop other symptoms are more anginal signs symptoms to return to the ER.  He was discharged in the care of his daughter.  I did suggest a follow-up with GI regarding his Schatzki's ring.          Diagnosis:    ICD-10-CM    1. Other chest pain  R07.89    2. Atrial flutter, unspecified type (H)  I48.92        Scribe Disclosure:  I, Inez Bridges, am serving as a scribe at 10:17 AM on 6/14/2021 to document services personally performed by Huey Teague MD based on my observations and the provider's statements to me.     I, Wally Boucher, am serving as a scribe  at 12:43 PM on 6/14/2021 to document services  personally performed by Huey Teague MD based on my observations and the provider's statements to me.               Huey Teague MD  06/14/21 1527

## 2021-06-14 NOTE — ED NOTES
Bed: ED10  Expected date: 6/14/21  Expected time: 9:50 AM  Means of arrival:   Comments:  Lucho 593 87yo m CP with cough

## 2021-06-15 LAB — INTERPRETATION ECG - MUSE: NORMAL

## 2021-08-17 ENCOUNTER — TELEPHONE (OUTPATIENT)
Dept: FAMILY MEDICINE | Facility: CLINIC | Age: 86
End: 2021-08-17

## 2021-08-17 NOTE — TELEPHONE ENCOUNTER
Received forms from [facility] for Pt to be admitted to memory care.  Last seen here by Maria Fernanda on 8/31/21, and Dr James who is now retired on 9/23/20.    Unable to fill out as we do not know his current medications or status.    LMOM for Louis

## 2021-08-17 NOTE — TELEPHONE ENCOUNTER
Received a fax from [employer] requesting orders to be signed by Maria Fernanda. Pt hasn't been seen in a year for an acute visit. Pt is due for OV before orders are approved or he could see a provider on their site if available.     Hilaria Vasquez phone # [phone]    Tresa left a voicemail to inform her of this above.    Forms are at my desk.

## 2021-08-18 ENCOUNTER — MEDICAL CORRESPONDENCE (OUTPATIENT)
Dept: HEALTH INFORMATION MANAGEMENT | Facility: CLINIC | Age: 86
End: 2021-08-18

## 2021-09-03 ENCOUNTER — ASSISTED LIVING VISIT (OUTPATIENT)
Dept: GERIATRICS | Facility: CLINIC | Age: 86
End: 2021-09-03
Payer: MEDICARE

## 2021-09-03 VITALS
TEMPERATURE: 97.8 F | OXYGEN SATURATION: 94 % | SYSTOLIC BLOOD PRESSURE: 115 MMHG | RESPIRATION RATE: 18 BRPM | DIASTOLIC BLOOD PRESSURE: 85 MMHG

## 2021-09-03 DIAGNOSIS — Z71.89 ACP (ADVANCE CARE PLANNING): ICD-10-CM

## 2021-09-03 DIAGNOSIS — N40.0 BENIGN PROSTATIC HYPERPLASIA, UNSPECIFIED WHETHER LOWER URINARY TRACT SYMPTOMS PRESENT: ICD-10-CM

## 2021-09-03 DIAGNOSIS — K21.9 GASTROESOPHAGEAL REFLUX DISEASE, UNSPECIFIED WHETHER ESOPHAGITIS PRESENT: ICD-10-CM

## 2021-09-03 DIAGNOSIS — F03.90 DEMENTIA WITHOUT BEHAVIORAL DISTURBANCE, UNSPECIFIED DEMENTIA TYPE: ICD-10-CM

## 2021-09-03 DIAGNOSIS — I48.91 ATRIAL FIBRILLATION, UNSPECIFIED TYPE (H): Primary | ICD-10-CM

## 2021-09-03 DIAGNOSIS — J44.9 CHRONIC OBSTRUCTIVE PULMONARY DISEASE, UNSPECIFIED COPD TYPE (H): ICD-10-CM

## 2021-09-03 RX ORDER — DONEPEZIL HYDROCHLORIDE 5 MG/1
5 TABLET, FILM COATED ORAL EVERY EVENING
COMMUNITY
End: 2023-01-01

## 2021-09-03 RX ORDER — ALBUTEROL SULFATE 90 UG/1
2 AEROSOL, METERED RESPIRATORY (INHALATION) EVERY 4 HOURS PRN
COMMUNITY
End: 2023-01-01

## 2021-09-03 NOTE — LETTER
9/3/2021        RE: William Ba  [id number] [id number]        [facility]  PRIMARY CARE PROVIDER AND CLINIC:  Abraham Velez, APRN CNP, [address] [place] [address] / [facility] [address]  Chief Complaint   Patient presents with     Establish Care     [facility] Medical Record Number:  [id number]  Place of Service where encounter took place:  [facility] ([facility]) [233]    William Ba  is a 86 year old  (1935), admitted to the above facility from home due to care needs. Admitted to this facility for  rehab, medical management and nursing care.    HPI:    HPI information obtained from: facility chart records, facility staff, patient report, [facility] chart review and family/first contact dtr report.   Brief Summary of Hospital Course:     A-fib. HRs stable.  Cont. On toprol xl. For anticoag. Taking xarelto, asa. 6/14/21 cbc stable. No recent reports of dizziness or falls. H/o AAA.     GERD: currently stable. Po intake stable.  Has h/o schatzkis ring.  No recent reports of dysphagia.      COPD: resp status stable.  Cont. On Breo, prn alb MDI.  O2 sats  Stable. No reports of ACUNA. Act. Level baseline    BPH:  H/o prostate ca. Cont. On saw palmetto.  No recent dysuria.    Dementia.  Memory loss.  ACLS 4.0.  Mood, behaviors stable.  Per dtr, has some days of increased confusion.  No reports of insomnia.         Updates on Status Since Skilled nursing Admission: po intake stable.    CODE STATUS/ADVANCE DIRECTIVES DISCUSSION:   DNR / DNI  Patient's living condition: lives in an assisted living facility  ALLERGIES: Bee, Flu virus vaccine, and Methylprednisolone  PAST MEDICAL HISTORY:  has a past medical history of Abdominal aortic aneurysm without rupture (H) (2/25/2016), Acute CVA (cerebrovascular accident) (H) (3/28/2019), Atrial fibrillation/flutter, Atrial fibrillation/flutter (H) (11/13/2019), COPD (chronic obstructive pulmonary disease), Coronary  atherosclerosis (1/29/2015), and Mixed hyperlipidemia (11/8/2010).  PAST SURGICAL HISTORY:   has a past surgical history that includes Replacement Total Knee; hernia repair; and colonoscopy.  FAMILY HISTORY: family history includes Abdominal Aortic Aneurysm in his father; Aneurysm in his father; Colon Cancer in his brother; Heart Disease in his mother.  SOCIAL HISTORY:   reports that he quit smoking about 19 years ago. He smoked 1.00 pack per day. He has never used smokeless tobacco. He reports current alcohol use.    Post Discharge Medication Reconciliation Status: discharge medications reconciled and changed, per note/orders    Current Outpatient Medications   Medication Sig Dispense Refill     albuterol (PROAIR HFA/PROVENTIL HFA/VENTOLIN HFA) 108 (90 Base) MCG/ACT inhaler Inhale 2 puffs into the lungs every 4 hours as needed for shortness of breath / dyspnea or wheezing       aspirin 81 MG EC tablet Take 81 mg by mouth daily        atorvastatin (LIPITOR) 40 MG tablet Take 1 tablet (40 mg) by mouth At Bedtime 90 tablet 3     donepezil (ARICEPT) 5 MG tablet Take 5 mg by mouth At Bedtime       EPINEPHrine (ANY BX GENERIC EQUIV) 0.3 MG/0.3ML injection 2-pack Inject 0.3 mLs (0.3 mg) into the muscle as needed for anaphylaxis 1 each 3     famotidine (PEPCID) 10 MG tablet Take 10 mg by mouth every evening       fluticasone-vilanterol (BREO ELLIPTA) 100-25 MCG/INH inhaler Inhale 1 puff into the lungs daily 1 Inhaler 11     metoprolol succinate ER (TOPROL-XL) 50 MG 24 hr tablet Take 1 tablet (50 mg) by mouth daily 90 tablet 3     Misc Natural Products (OSTEO BI-FLEX ADV TRIPLE ST) TABS Take 1 capsule by mouth 2 times daily        Multiple Vitamins-Minerals (MULTIVITAMIN ADULT PO) Take one(1) tablet daily.       rivaroxaban ANTICOAGULANT (XARELTO) 20 MG TABS tablet Take 1 tablet (20 mg) by mouth daily (with dinner) 90 tablet 3     Saw Palmetto 160 MG CAPS Take 1 capsule by mouth daily            ROS:  Unobtainable secondary  to cognitive impairment. Reports feeling good today    Vitals:  /85   Temp 97.8  F (36.6  C)   Resp 18   SpO2 94%   Exam:  GENERAL APPEARANCE:  Alert, in no distress, appears healthy, cooperative  ENT:  Mouth and posterior oropharynx normal, moist mucous membranes, normal hearing acuity, no rhinitis  EYES:  EOM, conjunctivae, lids, pupils and irises normal, PERRL, no drainage  NECK:  No adenopathy,masses or thyromegaly, no carotid bruiit, FROM  RESP:  respiratory effort and palpation of chest normal, lungs clear to auscultation , no respiratory distress  CV:  Palpation and auscultation of heart done , regular rate and rhythm, no murmur, rub, or gallop, no edema  ABDOMEN:  normal bowel sounds, soft, nontender, no hepatosplenomegaly or other masses, no guarding or rebound, no bruits  M/S:   Gait and station normal  muscle strength 5/5 all 4 ext., normal tone  NEURO:   Cranial nerves 2-12 are normal tested and grossly at patient's baseline, speech clear, no tremor  PSYCH:  insight and judgement impaired, memory impaired , affect and mood normal    Lab/Diagnostic data:    Most Recent 3 CBC's:Recent Labs   Lab Test 06/14/21  1005 09/23/20  0000 08/28/20  1649 07/01/20  1135   WBC 9.3  --  8.7 10.5   HGB 13.9 13.5 12.5* 12.7*   MCV 99  --  101* 97.4   *  --  127* 161     Most Recent 3 BMP's:Recent Labs   Lab Test 06/14/21  1005 09/23/20  0000 08/28/20  1649 06/17/20  0613    142.0 141 141   POTASSIUM 4.1 4.72 4.3 3.6   CHLORIDE 111* 107.7 110* 108   CO2 27 29.6 27 27   BUN 18 16  17.6 20 14   CR 0.97 0.91 0.87 0.88   ANIONGAP 3  --  4 6   NIEVES 8.9 9.3 8.7 8.1*   GLC 90 94 87 102*       ASSESSMENT/PLAN:  (I48.91) Atrial fibrillation, unspecified type (H)  (primary encounter diagnosis)  Comment: rate stable  Plan: 1. Cont. Metoprolol  2. Follow BPs, HRs  3. Cont. Asa, xarelto  4. Cbc in next 1-2 mos    (K21.9) Gastroesophageal reflux disease, unspecified whether esophagitis present  Comment: currently  stable  Plan: 1. Cont. pepcid  2. Monitor for reports of heartburn, nausea  3. Monitor for dysphagia-h/o schatskis ring    (J44.9) Chronic obstructive pulmonary disease, unspecified COPD type (H)  Comment: no current wheezing. No reports of sob.  O2 sats stable RA  Plan: 1. Cont. breo  2. Cont. Prn alb. MDI  3. Follow temps, O2 sats  4. Monitor for wheezing, sob    (N40.0) Benign prostatic hyperplasia, unspecified whether lower urinary tract symptoms present  Comment: currently stable  Plan: 1. Cont. Saw palmetto  2. Monitor for dysuria, increased urinary freq.    (F03.90) Dementia without behavioral disturbance, unspecified dementia type (H)  Comment: memory loss. Mood, behaviors stable  Plan: 1. Start aricept 5 mg every day, increase to 10 mg every day after 4 weeks  2. Monitor HR as above, monitor for GI S.E.s  3. Monitor cognitive status  4. Cont. Secured memory care unit    (Z71.89) ACP (advance care planning)  Comment: DNR, DNI  Plan: 1. POLST in chart    Electronically signed by:  EYAD Diaz CNP                           Sincerely,        EYAD Diaz CNP

## 2021-09-03 NOTE — PROGRESS NOTES
[facility]  PRIMARY CARE PROVIDER AND CLINIC:  Abraham Velez, APRN CNP, [address] / [place] [address]  Chief Complaint   Patient presents with     [facility]     [facility] Record Number:  [id number]  Place of Service where encounter took place:  [facility] ([facility]) [233]    William Ba  is a 86 year old  (1935), admitted to the above facility from home due to care needs. Admitted to this facility for  rehab, medical management and nursing care.    HPI:    HPI information obtained from: facility chart records, facility staff, patient report, [facility] chart review and family/first contact dtr report.   Brief Summary of Hospital Course:     A-fib. HRs stable.  Cont. On toprol xl. For anticoag. Taking xarelto, asa. 6/14/21 cbc stable. No recent reports of dizziness or falls. H/o AAA.     GERD: currently stable. Po intake stable.  Has h/o schatzkis ring.  No recent reports of dysphagia.      COPD: resp status stable.  Cont. On Breo, prn alb MDI.  O2 sats  Stable. No reports of ACUNA. Act. Level baseline    BPH:  H/o prostate ca. Cont. On saw palmetto.  No recent dysuria.    Dementia.  Memory loss.  ACLS 4.0.  Mood, behaviors stable.  Per dtr, has some days of increased confusion.  No reports of insomnia.         Updates on Status Since Skilled nursing Admission: po intake stable.    CODE STATUS/ADVANCE DIRECTIVES DISCUSSION:   DNR / DNI  Patient's living condition: lives in an assisted living facility  ALLERGIES: Bee, Flu virus vaccine, and Methylprednisolone  PAST MEDICAL HISTORY:  has a past medical history of Abdominal aortic aneurysm without rupture (H) (2/25/2016), Acute CVA (cerebrovascular accident) (H) (3/28/2019), Atrial fibrillation/flutter, Atrial fibrillation/flutter (H) (11/13/2019), COPD (chronic obstructive pulmonary disease), Coronary atherosclerosis (1/29/2015), and Mixed hyperlipidemia (11/8/2010).  PAST SURGICAL HISTORY:   has a  past surgical history that includes Replacement Total Knee; hernia repair; and colonoscopy.  FAMILY HISTORY: family history includes Abdominal Aortic Aneurysm in his father; Aneurysm in his father; Colon Cancer in his brother; Heart Disease in his mother.  SOCIAL HISTORY:   reports that he quit smoking about 19 years ago. He smoked 1.00 pack per day. He has never used smokeless tobacco. He reports current alcohol use.    Post Discharge Medication Reconciliation Status: discharge medications reconciled and changed, per note/orders    Current Outpatient Medications   Medication Sig Dispense Refill     albuterol (PROAIR HFA/PROVENTIL HFA/VENTOLIN HFA) 108 (90 Base) MCG/ACT inhaler Inhale 2 puffs into the lungs every 4 hours as needed for shortness of breath / dyspnea or wheezing       aspirin 81 MG EC tablet Take 81 mg by mouth daily        atorvastatin (LIPITOR) 40 MG tablet Take 1 tablet (40 mg) by mouth At Bedtime 90 tablet 3     donepezil (ARICEPT) 5 MG tablet Take 5 mg by mouth At Bedtime       EPINEPHrine (ANY BX GENERIC EQUIV) 0.3 MG/0.3ML injection 2-pack Inject 0.3 mLs (0.3 mg) into the muscle as needed for anaphylaxis 1 each 3     famotidine (PEPCID) 10 MG tablet Take 10 mg by mouth every evening       fluticasone-vilanterol (BREO ELLIPTA) 100-25 MCG/INH inhaler Inhale 1 puff into the lungs daily 1 Inhaler 11     metoprolol succinate ER (TOPROL-XL) 50 MG 24 hr tablet Take 1 tablet (50 mg) by mouth daily 90 tablet 3     Misc Natural Products (OSTEO BI-FLEX ADV TRIPLE ST) TABS Take 1 capsule by mouth 2 times daily        Multiple Vitamins-Minerals (MULTIVITAMIN ADULT PO) Take one(1) tablet daily.       rivaroxaban ANTICOAGULANT (XARELTO) 20 MG TABS tablet Take 1 tablet (20 mg) by mouth daily (with dinner) 90 tablet 3     Saw Palmetto 160 MG CAPS Take 1 capsule by mouth daily            ROS:  Unobtainable secondary to cognitive impairment. Reports feeling good today    Vitals:  /85   Temp 97.8  F (36.6   C)   Resp 18   SpO2 94%   Exam:  GENERAL APPEARANCE:  Alert, in no distress, appears healthy, cooperative  ENT:  Mouth and posterior oropharynx normal, moist mucous membranes, normal hearing acuity, no rhinitis  EYES:  EOM, conjunctivae, lids, pupils and irises normal, PERRL, no drainage  NECK:  No adenopathy,masses or thyromegaly, no carotid bruiit, FROM  RESP:  respiratory effort and palpation of chest normal, lungs clear to auscultation , no respiratory distress  CV:  Palpation and auscultation of heart done , regular rate and rhythm, no murmur, rub, or gallop, no edema  ABDOMEN:  normal bowel sounds, soft, nontender, no hepatosplenomegaly or other masses, no guarding or rebound, no bruits  M/S:   Gait and station normal  muscle strength 5/5 all 4 ext., normal tone  NEURO:   Cranial nerves 2-12 are normal tested and grossly at patient's baseline, speech clear, no tremor  PSYCH:  insight and judgement impaired, memory impaired , affect and mood normal    Lab/Diagnostic data:    Most Recent 3 CBC's:Recent Labs   Lab Test 06/14/21  1005 09/23/20  0000 08/28/20  1649 07/01/20  1135   WBC 9.3  --  8.7 10.5   HGB 13.9 13.5 12.5* 12.7*   MCV 99  --  101* 97.4   *  --  127* 161     Most Recent 3 BMP's:Recent Labs   Lab Test 06/14/21  1005 09/23/20  0000 08/28/20  1649 06/17/20  0613    142.0 141 141   POTASSIUM 4.1 4.72 4.3 3.6   CHLORIDE 111* 107.7 110* 108   CO2 27 29.6 27 27   BUN 18 16  17.6 20 14   CR 0.97 0.91 0.87 0.88   ANIONGAP 3  --  4 6   NIEVES 8.9 9.3 8.7 8.1*   GLC 90 94 87 102*       ASSESSMENT/PLAN:  (I48.91) Atrial fibrillation, unspecified type (H)  (primary encounter diagnosis)  Comment: rate stable  Plan: 1. Cont. Metoprolol  2. Follow BPs, HRs  3. Cont. Asa, xarelto  4. Cbc in next 1-2 mos    (K21.9) Gastroesophageal reflux disease, unspecified whether esophagitis present  Comment: currently stable  Plan: 1. Cont. pepcid  2. Monitor for reports of heartburn, nausea  3. Monitor for  dysphagia-h/o schatskis ring    (J44.9) Chronic obstructive pulmonary disease, unspecified COPD type (H)  Comment: no current wheezing. No reports of sob.  O2 sats stable RA  Plan: 1. Cont. breo  2. Cont. Prn alb. MDI  3. Follow temps, O2 sats  4. Monitor for wheezing, sob    (N40.0) Benign prostatic hyperplasia, unspecified whether lower urinary tract symptoms present  Comment: currently stable  Plan: 1. Cont. Saw palmetto  2. Monitor for dysuria, increased urinary freq.    (F03.90) Dementia without behavioral disturbance, unspecified dementia type (H)  Comment: memory loss. Mood, behaviors stable  Plan: 1. Start aricept 5 mg every day, increase to 10 mg every day after 4 weeks  2. Monitor HR as above, monitor for GI S.E.s  3. Monitor cognitive status  4. Cont. Secured memory care unit    (Z71.89) ACP (advance care planning)  Comment: DNR, DNI  Plan: 1. POLST in chart    Electronically signed by:  EYAD Diaz CNP

## 2021-10-10 ENCOUNTER — HEALTH MAINTENANCE LETTER (OUTPATIENT)
Age: 86
End: 2021-10-10

## 2021-11-10 ENCOUNTER — ASSISTED LIVING VISIT (OUTPATIENT)
Dept: GERIATRICS | Facility: CLINIC | Age: 86
End: 2021-11-10
Payer: MEDICARE

## 2021-11-10 VITALS
HEIGHT: 71 IN | BODY MASS INDEX: 22.68 KG/M2 | TEMPERATURE: 97.5 F | WEIGHT: 162 LBS | RESPIRATION RATE: 16 BRPM | HEART RATE: 86 BPM | SYSTOLIC BLOOD PRESSURE: 132 MMHG | DIASTOLIC BLOOD PRESSURE: 80 MMHG

## 2021-11-10 DIAGNOSIS — I48.11 LONGSTANDING PERSISTENT ATRIAL FIBRILLATION (H): ICD-10-CM

## 2021-11-10 DIAGNOSIS — I25.10 CORONARY ARTERY DISEASE INVOLVING NATIVE CORONARY ARTERY OF NATIVE HEART WITHOUT ANGINA PECTORIS: Primary | ICD-10-CM

## 2021-11-10 DIAGNOSIS — K21.9 GASTROESOPHAGEAL REFLUX DISEASE, UNSPECIFIED WHETHER ESOPHAGITIS PRESENT: ICD-10-CM

## 2021-11-10 DIAGNOSIS — C61 MALIGNANT NEOPLASM OF PROSTATE (H): ICD-10-CM

## 2021-11-10 DIAGNOSIS — J44.9 CHRONIC OBSTRUCTIVE PULMONARY DISEASE, UNSPECIFIED COPD TYPE (H): ICD-10-CM

## 2021-11-10 DIAGNOSIS — F03.90 DEMENTIA WITHOUT BEHAVIORAL DISTURBANCE, UNSPECIFIED DEMENTIA TYPE: ICD-10-CM

## 2021-11-10 DIAGNOSIS — I71.40 ABDOMINAL AORTIC ANEURYSM, WITHOUT RUPTURE: ICD-10-CM

## 2021-11-10 ASSESSMENT — MIFFLIN-ST. JEOR: SCORE: 1434.02

## 2021-11-10 NOTE — LETTER
11/10/2021        RE: William Ba  [address] [facility] [address]        William aB is a 86 year old male seen November 10, 2021 at [facility] where he has resided for 3 months (admit 8/2021)   Pt is seen in his apartment with his wife Myriam, daughter Minerva and son-in-law present.   He is not able to give much history, reports feeling well. Gradual memory loss over past year or so.   Family notes increased clarity after donepezil started in September, but does have side effect of diarrhea with urgency since dose increased to 10 mg/day.     Pt has a h/o HTN, COPD, AAA, atrial fib/flutter anticoagulated with rivaroxaban and dementia.    He was hospitalized in June 2020 following a fall in which he suffered an 8th left rib fracture with small pneumothorax, then rehospitalized a short while later with RLL pneumonia.   He also had a June 2021 ED visit for chest pain thought to be pill esophagitis.    He has been followed in Cardiology clinic, last seen there 12/2019   Has CAD, s/p IMI and per angiogram with chronic mid-RCA obstruction, 50% LAD stenosis and moderate disease elsewhere.   Has had PAF and anticoagulated past 7 years.   AAA on abd CT with diameter 2.9cm when last checked.    A 48 hour Holter monitor in 2019 showed principle rhythm of atrial flutter, no pauses, few ventricular ectopic beats.   Average HR 99 bpm and metoprolol dose increased.     Brain MRI in 2019 showed MODERATE MICROVASCULAR ISCHEMIC CHANGE. NEGATIVE FOR ACUTE INFARCT. SEVERE   CEREBRAL VOLUME LOSS    Past Medical History:   Diagnosis Date     Abdominal aortic aneurysm without rupture (H) 2/25/2016     Acute CVA (cerebrovascular accident) (H) 3/28/2019     Atrial fibrillation/flutter      Atrial fibrillation/flutter (H) 11/13/2019     COPD (chronic obstructive pulmonary disease)      Coronary atherosclerosis 1/29/2015     Mixed hyperlipidemia 11/8/2010   Late onset dementia, Alzheimer's  "type    Past Surgical History:   Procedure Laterality Date     COLONOSCOPY       HERNIA REPAIR       REPLACEMENT TOTAL KNEE       SH:   Lives with his wife Myriam, [facility].   They previously lived in [place]/ [place] then in [facility] April-August 2021   Daughter Minerva lives in [place] and is first contact  They also have 2 sons   Past smoker, 75 pack year smoking history      ROS:  ACL 4.0    Wt Readings from Last 5 Encounters:   11/10/21 73.5 kg (162 lb)   09/23/20 77.2 kg (170 lb 3.2 oz)   08/31/20 77.1 kg (170 lb)   07/01/20 79.7 kg (175 lb 9.6 oz)   06/15/20 78.4 kg (172 lb 14.4 oz)      EXAM: NAD  /80   Pulse 86   Temp 97.5  F (36.4  C)   Resp 16   Ht 1.791 m (5' 10.5\")   Wt 73.5 kg (162 lb)   BMI 22.92 kg/m     [place]  Neck supple without adenopathy  Lungs with decreased BS, no rales or wheeze  Heart RRR s1s2 distant  Abd soft, NT, no distention or guarding, +BS  Ext with trace edema  Neuro: limited history, ambulatory without device  Psych: affect okay, pleasant        Last Comprehensive Metabolic Panel:  Sodium   Date Value Ref Range Status   06/14/2021 141 133 - 144 mmol/L Final     Potassium   Date Value Ref Range Status   06/14/2021 4.1 3.4 - 5.3 mmol/L Final     Chloride   Date Value Ref Range Status   06/14/2021 111 (H) 94 - 109 mmol/L Final     Carbon Dioxide   Date Value Ref Range Status   06/14/2021 27 20 - 32 mmol/L Final     Anion Gap   Date Value Ref Range Status   06/14/2021 3 3 - 14 mmol/L Final     Glucose   Date Value Ref Range Status   06/14/2021 90 70 - 99 mg/dL Final     Urea Nitrogen   Date Value Ref Range Status   06/14/2021 18 7 - 30 mg/dL Final     Creatinine   Date Value Ref Range Status   06/14/2021 0.97 0.66 - 1.25 mg/dL Final     GFR Estimate   Date Value Ref Range Status   06/14/2021 71 >60 mL/min/[1.73_m2] Final     Comment:     Non  GFR Calc  Starting 12/18/2018, serum creatinine based estimated GFR (eGFR) will be   calculated using the " Chronic Kidney Disease Epidemiology Collaboration   (CKD-EPI) equation.       Calcium   Date Value Ref Range Status   06/14/2021 8.9 8.5 - 10.1 mg/dL Final     Lab Results   Component Value Date    AST 18 06/14/2021      ALBUMIN 3.5 06/14/2021      ALKPHOS 82 06/14/2021     Lab Results   Component Value Date    CHOL 104 09/23/2020     Lab Results   Component Value Date    HDL 51 09/23/2020     Lab Results   Component Value Date    LDL 43 09/23/2020     Lab Results   Component Value Date    TRIG 48 09/23/2020     Lab Results   Component Value Date    WBC 9.3 06/14/2021      HGB 13.9 06/14/2021      MCV 99 06/14/2021       06/14/2021     ECHO 2019:     Left Ventricle: Biplane Ejection Fraction visually appears to be   borderline low normal around 50%. Normal cavity size and wall thickness.   Normal left ventricular diastolic function.     Left Atrium: Normal cavity size. Negative bubble study     Aortic Valve: Aortic valve not well visualized. The valve is mildly   sclerotic. No stenosis. Trace regurgitation is present.     Right Ventricle: Normal cavity size and right ventricular function.     Pericardium: Pericardium is normal. There is no pericardial effusion.      IMP/PLAN:   (I25.10) Coronary artery disease involving native coronary artery of native heart without angina pectoris    Comment: h/o IMI     Plan: daily aspirin 81 mg and atorvastatin 40 mg/day for secondary prevention       (I48.11) Longstanding persistent atrial fibrillation (H)  Comment:   Pulse Readings from Last 4 Encounters:   11/10/21 86   06/14/21 81   09/23/20 82   08/31/20 67      Plan: metoprolol ER 50 mg/day for VR control   Rivaroxaban 20 mg/day for stroke prophylaxis        (J44.9) Chronic obstructive pulmonary disease, unspecified COPD type (H)  Comment: significant smoking history   Plan: Breo Ellipta daily, prn albuterol MDI for rescue        (F03.90) Dementia without behavioral disturbance, unspecified dementia type  (H)  Comment: GI side effects of diarrhea with urgency on higher dose of donepezil   Plan: Memory Care Al for assist with med admin, meals, activity   Decrease donepezil back to 5 mg/day       (I71.4) Abdominal aortic aneurysm, without rupture (H)  Comment: 2.9 cm   Plan: no further surveillance as long as he is asx     (C61) Malignant neoplasm of prostate (H)  Comment: treated  Plan: follow     (K21.9) Gastroesophageal reflux disease, unspecified whether esophagitis present  Comment: h/o Schatzki's ring    Plan: initially decide to change famotidine to prn per daughter, but given ASA + anticoagulation may want to reinstate scheduled dose.   Follow hgsonya Suresh MD         Sincerely,        Sarah Suresh MD

## 2021-11-21 PROBLEM — J96.01 ACUTE RESPIRATORY FAILURE WITH HYPOXIA (H): Status: ACTIVE | Noted: 2021-11-21

## 2021-11-21 PROBLEM — J96.01 ACUTE RESPIRATORY FAILURE WITH HYPOXIA (H): Status: RESOLVED | Noted: 2021-11-21 | Resolved: 2021-11-21

## 2021-11-21 NOTE — PROGRESS NOTES
William Ba is a 86 year old male seen November 10, 2021 at [facility] where he has resided for 3 months (admit 8/2021)   Pt is seen in his apartment with his wife Myriam, daughter Minerva and son-in-law present.   He is not able to give much history, reports feeling well. Gradual memory loss over past year or so.   Family notes increased clarity after donepezil started in September, but does have side effect of diarrhea with urgency since dose increased to 10 mg/day.     Pt has a h/o HTN, COPD, AAA, atrial fib/flutter anticoagulated with rivaroxaban and dementia.    He was hospitalized in June 2020 following a fall in which he suffered an 8th left rib fracture with small pneumothorax, then rehospitalized a short while later with RLL pneumonia.   He also had a June 2021 ED visit for chest pain thought to be pill esophagitis.    He has been followed in Cardiology clinic, last seen there 12/2019   Has CAD, s/p IMI and per angiogram with chronic mid-RCA obstruction, 50% LAD stenosis and moderate disease elsewhere.   Has had PAF and anticoagulated past 7 years.   AAA on abd CT with diameter 2.9cm when last checked.    A 48 hour Holter monitor in 2019 showed principle rhythm of atrial flutter, no pauses, few ventricular ectopic beats.   Average HR 99 bpm and metoprolol dose increased.     Brain MRI in 2019 showed MODERATE MICROVASCULAR ISCHEMIC CHANGE. NEGATIVE FOR ACUTE INFARCT. SEVERE   CEREBRAL VOLUME LOSS    Past Medical History:   Diagnosis Date     Abdominal aortic aneurysm without rupture (H) 2/25/2016     Acute CVA (cerebrovascular accident) (H) 3/28/2019     Atrial fibrillation/flutter      Atrial fibrillation/flutter (H) 11/13/2019     COPD (chronic obstructive pulmonary disease)      Coronary atherosclerosis 1/29/2015     Mixed hyperlipidemia 11/8/2010   Late onset dementia, Alzheimer's type    Past Surgical History:   Procedure Laterality Date     COLONOSCOPY       HERNIA  "REPAIR       REPLACEMENT TOTAL KNEE       SH:   Lives with his wife Myriam, [facility] [place].   They previously lived in [place]/ [place] then in [facility] April-August 2021   Daughter Minerva lives in [place] and is first contact  They also have 2 sons   Past smoker, 75 pack year smoking history      ROS:  ACL 4.0    Wt Readings from Last 5 Encounters:   11/10/21 73.5 kg (162 lb)   09/23/20 77.2 kg (170 lb 3.2 oz)   08/31/20 77.1 kg (170 lb)   07/01/20 79.7 kg (175 lb 9.6 oz)   06/15/20 78.4 kg (172 lb 14.4 oz)      EXAM: NAD  /80   Pulse 86   Temp 97.5  F (36.4  C)   Resp 16   Ht 1.791 m (5' 10.5\")   Wt 73.5 kg (162 lb)   BMI 22.92 kg/m     [place]  Neck supple without adenopathy  Lungs with decreased BS, no rales or wheeze  Heart RRR s1s2 distant  Abd soft, NT, no distention or guarding, +BS  Ext with trace edema  Neuro: limited history, ambulatory without device  Psych: affect okay, pleasant        Last Comprehensive Metabolic Panel:  Sodium   Date Value Ref Range Status   06/14/2021 141 133 - 144 mmol/L Final     Potassium   Date Value Ref Range Status   06/14/2021 4.1 3.4 - 5.3 mmol/L Final     Chloride   Date Value Ref Range Status   06/14/2021 111 (H) 94 - 109 mmol/L Final     Carbon Dioxide   Date Value Ref Range Status   06/14/2021 27 20 - 32 mmol/L Final     Anion Gap   Date Value Ref Range Status   06/14/2021 3 3 - 14 mmol/L Final     Glucose   Date Value Ref Range Status   06/14/2021 90 70 - 99 mg/dL Final     Urea Nitrogen   Date Value Ref Range Status   06/14/2021 18 7 - 30 mg/dL Final     Creatinine   Date Value Ref Range Status   06/14/2021 0.97 0.66 - 1.25 mg/dL Final     GFR Estimate   Date Value Ref Range Status   06/14/2021 71 >60 mL/min/[1.73_m2] Final     Comment:     Non  GFR Calc  Starting 12/18/2018, serum creatinine based estimated GFR (eGFR) will be   calculated using the Chronic Kidney Disease Epidemiology Collaboration   (CKD-EPI) equation.       Calcium "   Date Value Ref Range Status   06/14/2021 8.9 8.5 - 10.1 mg/dL Final     Lab Results   Component Value Date    AST 18 06/14/2021      ALBUMIN 3.5 06/14/2021      ALKPHOS 82 06/14/2021     Lab Results   Component Value Date    CHOL 104 09/23/2020     Lab Results   Component Value Date    HDL 51 09/23/2020     Lab Results   Component Value Date    LDL 43 09/23/2020     Lab Results   Component Value Date    TRIG 48 09/23/2020     Lab Results   Component Value Date    WBC 9.3 06/14/2021      HGB 13.9 06/14/2021      MCV 99 06/14/2021       06/14/2021     ECHO 2019:     Left Ventricle: Biplane Ejection Fraction visually appears to be   borderline low normal around 50%. Normal cavity size and wall thickness.   Normal left ventricular diastolic function.     Left Atrium: Normal cavity size. Negative bubble study     Aortic Valve: Aortic valve not well visualized. The valve is mildly   sclerotic. No stenosis. Trace regurgitation is present.     Right Ventricle: Normal cavity size and right ventricular function.     Pericardium: Pericardium is normal. There is no pericardial effusion.      IMP/PLAN:   (I25.10) Coronary artery disease involving native coronary artery of native heart without angina pectoris    Comment: h/o IMI     Plan: daily aspirin 81 mg and atorvastatin 40 mg/day for secondary prevention       (I48.11) Longstanding persistent atrial fibrillation (H)  Comment:   Pulse Readings from Last 4 Encounters:   11/10/21 86   06/14/21 81   09/23/20 82   08/31/20 67      Plan: metoprolol ER 50 mg/day for VR control   Rivaroxaban 20 mg/day for stroke prophylaxis        (J44.9) Chronic obstructive pulmonary disease, unspecified COPD type (H)  Comment: significant smoking history   Plan: Breo Ellipta daily, prn albuterol MDI for rescue        (F03.90) Dementia without behavioral disturbance, unspecified dementia type (H)  Comment: GI side effects of diarrhea with urgency on higher dose of donepezil   Plan: Memory  Care Al for assist with med admin, meals, activity   Decrease donepezil back to 5 mg/day       (I71.4) Abdominal aortic aneurysm, without rupture (H)  Comment: 2.9 cm   Plan: no further surveillance as long as he is asx     (C61) Malignant neoplasm of prostate (H)  Comment: treated  Plan: follow     (K21.9) Gastroesophageal reflux disease, unspecified whether esophagitis present  Comment: h/o Schatzki's ring    Plan: initially decide to change famotidine to prn per daughter, but given ASA + anticoagulation may want to reinstate scheduled dose.   Follow hgsonya Suresh MD

## 2021-12-10 ENCOUNTER — ASSISTED LIVING VISIT (OUTPATIENT)
Dept: GERIATRICS | Facility: CLINIC | Age: 86
End: 2021-12-10
Payer: MEDICARE

## 2021-12-10 VITALS
OXYGEN SATURATION: 95 % | RESPIRATION RATE: 16 BRPM | HEART RATE: 68 BPM | SYSTOLIC BLOOD PRESSURE: 120 MMHG | DIASTOLIC BLOOD PRESSURE: 67 MMHG

## 2021-12-10 DIAGNOSIS — H61.23 BILATERAL IMPACTED CERUMEN: Primary | ICD-10-CM

## 2021-12-10 DIAGNOSIS — I48.91 ATRIAL FIBRILLATION, UNSPECIFIED TYPE (H): ICD-10-CM

## 2021-12-10 DIAGNOSIS — J44.9 CHRONIC OBSTRUCTIVE PULMONARY DISEASE, UNSPECIFIED COPD TYPE (H): ICD-10-CM

## 2021-12-10 DIAGNOSIS — R19.7 DIARRHEA, UNSPECIFIED TYPE: ICD-10-CM

## 2021-12-10 NOTE — PROGRESS NOTES
[place] [facility]  [place] Medical Record Number:  [id number]  Place of Service where encounter took place:  [facility] ([facility]) [[id number]]  Chief Complaint   Patient presents with     Cerumen (impacted)       HPI:    William Ba  is a 86 year old (1935), who is being seen today for an episodic care visit.  HPI information obtained from: facility chart records, facility staff, patient report and [facility] chart review. Today's concern is: cerumen, copd, a-fib.  Has had bilat cerumen.  Had course of debrox. Has cerumen remaining in both ear canals. For copd cont. On breo, prn alb MDI. No recent wheezing. No reports of cough. O2 sats stable. For a-fib cont. On xarelto, asa.  HR stable.  No recent reports of dizziness.  Last month had diarrhea.  aricept dose decreased to 5 mg every day.  No further diarrhea, no changes in mood, behaviors.        Past Medical and Surgical History reviewed in Epic today.    MEDICATIONS:    Current Outpatient Medications   Medication Sig Dispense Refill     albuterol (PROAIR HFA/PROVENTIL HFA/VENTOLIN HFA) 108 (90 Base) MCG/ACT inhaler Inhale 2 puffs into the lungs every 4 hours as needed for shortness of breath / dyspnea or wheezing       aspirin 81 MG EC tablet Take 81 mg by mouth daily        atorvastatin (LIPITOR) 40 MG tablet Take 1 tablet (40 mg) by mouth At Bedtime 90 tablet 3     donepezil (ARICEPT) 5 MG tablet Take 5 mg by mouth At Bedtime       EPINEPHrine (ANY BX GENERIC EQUIV) 0.3 MG/0.3ML injection 2-pack Inject 0.3 mLs (0.3 mg) into the muscle as needed for anaphylaxis 1 each 3     famotidine (PEPCID) 10 MG tablet Take 10 mg by mouth every evening       fluticasone-vilanterol (BREO ELLIPTA) 100-25 MCG/INH inhaler Inhale 1 puff into the lungs daily 1 Inhaler 11     metoprolol succinate ER (TOPROL-XL) 50 MG 24 hr tablet Take 1 tablet (50 mg) by mouth daily 90 tablet 3     Misc Natural Products (OSTEO BI-FLEX ADV TRIPLE ST) TABS Take 1 capsule by  mouth 2 times daily        Multiple Vitamins-Minerals (MULTIVITAMIN ADULT PO) Take one(1) tablet daily.       rivaroxaban ANTICOAGULANT (XARELTO) 20 MG TABS tablet Take 1 tablet (20 mg) by mouth daily (with dinner) 90 tablet 3     Saw Palmetto 160 MG CAPS Take 1 capsule by mouth daily            REVIEW OF SYSTEMS:  No chest pain, shortness of breath, fevers, chills, headache, nausea, vomiting, dysuria or bowel abnormalities.  Appetite is  normal.  No pain except occ aches.    Objective:  /67   Pulse 68   Resp 16   SpO2 95%   Exam:  GENERAL APPEARANCE:  Alert, in no distress  ENT:  Mouth and posterior oropharynx normal, moist mucous membranes, [place], cerumen R ear canal, L ear canal free of cerumen s/p removal with curette  EYES:  EOM, conjunctivae, lids, pupils and irises normal, PERRL  NECK:  FROM  RESP:  respiratory effort and palpation of chest normal, lungs clear to auscultation , no respiratory distress  CV:  Palpation and auscultation of heart done , regular rate and rhythm, no murmur, rub, or gallop, no edema  ABDOMEN:  normal bowel sounds, soft, nontender, no hepatosplenomegaly or other masses, no guarding or rebound  M/S:   Gait and station normal  muscle strength 5/5 all 4 ext.  NEURO:   Cranial nerves 2-12 are normal tested and grossly at patient's baseline  PSYCH:  insight and judgement impaired, memory impaired , affect and mood normal    Labs:   Recent labs in [facility] reviewed by me today.     ASSESSMENT/PLAN:  (H61.23) Bilateral impacted cerumen  (primary encounter diagnosis)  Comment: L ear canal clear, remaining cerumen R ear, unable to remove with curette  Plan: 1. Restart debrox to R ear  2. Reassess next week, irrigate prn  3. Monitor for ear pain.    (J44.9) Chronic obstructive pulmonary disease, unspecified COPD type (H)  Comment: currently stable  Plan:1. Cont. Breo alb. MDI  2. Follow O2 sats, temps  3. Monitor for wheezing, sob    (I48.91) Atrial fibrillation, unspecified type  (H)  Comment: rate stable  Plan: 1. Cont. Xarelto, asa  2. Cont. metoprolol  3. Cbc, bmp in next 1-3 mos    (R19.7) Diarrhea, unspecified type  Comment: resolved since aricept dose decrease  Plan: 1. Cont. aricept 5 mg every day  2. For further diarrhea, may start prn imodium    Electronically signed by:  EYAD Diaz CNP

## 2021-12-10 NOTE — LETTER
12/10/2021        RE: William Ba  [id number] [address] [address]        [place] [facility]  [place] Medical Record Number:  [id number]  Place of Service where encounter took place:  [facility] ([facility]) [[id number]]  Chief Complaint   Patient presents with     Cerumen (impacted)       HPI:    William Ba  is a 86 year old (1935), who is being seen today for an episodic care visit.  HPI information obtained from: facility chart records, facility staff, patient report and [facility] chart review. Today's concern is: cerumen, copd, a-fib.  Has had bilat cerumen.  Had course of debrox. Has cerumen remaining in both ear canals. For copd cont. On breo, prn alb MDI. No recent wheezing. No reports of cough. O2 sats stable. For a-fib cont. On xarelto, asa.  HR stable.  No recent reports of dizziness.  Last month had diarrhea.  aricept dose decreased to 5 mg every day.  No further diarrhea, no changes in mood, behaviors.        Past Medical and Surgical History reviewed in Epic today.    MEDICATIONS:    Current Outpatient Medications   Medication Sig Dispense Refill     albuterol (PROAIR HFA/PROVENTIL HFA/VENTOLIN HFA) 108 (90 Base) MCG/ACT inhaler Inhale 2 puffs into the lungs every 4 hours as needed for shortness of breath / dyspnea or wheezing       aspirin 81 MG EC tablet Take 81 mg by mouth daily        atorvastatin (LIPITOR) 40 MG tablet Take 1 tablet (40 mg) by mouth At Bedtime 90 tablet 3     donepezil (ARICEPT) 5 MG tablet Take 5 mg by mouth At Bedtime       EPINEPHrine (ANY BX GENERIC EQUIV) 0.3 MG/0.3ML injection 2-pack Inject 0.3 mLs (0.3 mg) into the muscle as needed for anaphylaxis 1 each 3     famotidine (PEPCID) 10 MG tablet Take 10 mg by mouth every evening       fluticasone-vilanterol (BREO ELLIPTA) 100-25 MCG/INH inhaler Inhale 1 puff into the lungs daily 1 Inhaler 11     metoprolol succinate ER (TOPROL-XL) 50 MG 24 hr tablet Take 1 tablet (50 mg) by mouth daily  90 tablet 3     Misc Natural Products (OSTEO BI-FLEX ADV TRIPLE ST) TABS Take 1 capsule by mouth 2 times daily        Multiple Vitamins-Minerals (MULTIVITAMIN ADULT PO) Take one(1) tablet daily.       rivaroxaban ANTICOAGULANT (XARELTO) 20 MG TABS tablet Take 1 tablet (20 mg) by mouth daily (with dinner) 90 tablet 3     Saw Palmetto 160 MG CAPS Take 1 capsule by mouth daily            REVIEW OF SYSTEMS:  No chest pain, shortness of breath, fevers, chills, headache, nausea, vomiting, dysuria or bowel abnormalities.  Appetite is  normal.  No pain except occ aches.    Objective:  /67   Pulse 68   Resp 16   SpO2 95%   Exam:  GENERAL APPEARANCE:  Alert, in no distress  ENT:  Mouth and posterior oropharynx normal, moist mucous membranes, [place], cerumen R ear canal, L ear canal free of cerumen s/p removal with curette  EYES:  EOM, conjunctivae, lids, pupils and irises normal, PERRL  NECK:  FROM  RESP:  respiratory effort and palpation of chest normal, lungs clear to auscultation , no respiratory distress  CV:  Palpation and auscultation of heart done , regular rate and rhythm, no murmur, rub, or gallop, no edema  ABDOMEN:  normal bowel sounds, soft, nontender, no hepatosplenomegaly or other masses, no guarding or rebound  M/S:   Gait and station normal  muscle strength 5/5 all 4 ext.  NEURO:   Cranial nerves 2-12 are normal tested and grossly at patient's baseline  PSYCH:  insight and judgement impaired, memory impaired , affect and mood normal    Labs:   Recent labs in [facility] reviewed by me today.     ASSESSMENT/PLAN:  (H61.23) Bilateral impacted cerumen  (primary encounter diagnosis)  Comment: L ear canal clear, remaining cerumen R ear, unable to remove with curette  Plan: 1. Restart debrox to R ear  2. Reassess next week, irrigate prn  3. Monitor for ear pain.    (J44.9) Chronic obstructive pulmonary disease, unspecified COPD type (H)  Comment: currently stable  Plan:1. Cont. Breo, alb. MDI  2. Follow O2 sats,  temps  3. Monitor for wheezing, sob    (I48.91) Atrial fibrillation, unspecified type (H)  Comment: rate stable  Plan: 1. Cont. Xarelto, asa  2. Cont. metoprolol  3. Cbc, bmp in next 1-3 mos    (R19.7) Diarrhea, unspecified type  Comment: resolved since aricept dose decrease  Plan: 1. Cont. aricept 5 mg every day  2. For further diarrhea, may start prn imodium    Electronically signed by:  EYAD Diaz CNP                 Sincerely,        EYAD Diaz CNP

## 2021-12-15 ENCOUNTER — ASSISTED LIVING VISIT (OUTPATIENT)
Dept: GERIATRICS | Facility: CLINIC | Age: 86
End: 2021-12-15
Payer: MEDICARE

## 2021-12-15 VITALS
RESPIRATION RATE: 18 BRPM | TEMPERATURE: 97.8 F | HEART RATE: 68 BPM | OXYGEN SATURATION: 94 % | DIASTOLIC BLOOD PRESSURE: 66 MMHG | SYSTOLIC BLOOD PRESSURE: 125 MMHG

## 2021-12-15 DIAGNOSIS — H61.21 IMPACTED CERUMEN OF RIGHT EAR: Primary | ICD-10-CM

## 2021-12-15 DIAGNOSIS — F51.01 PRIMARY INSOMNIA: ICD-10-CM

## 2021-12-15 DIAGNOSIS — R05.9 COUGH: ICD-10-CM

## 2021-12-15 NOTE — LETTER
12/15/2021        RE: William Ba  [id number] [address] [address]        [place] [facility]  [place] Medical Record Number:  [id number]  Place of Service where encounter took place:  [facility] ([facility]) [[id number]]  Chief Complaint   Patient presents with     Cerumen (impacted)     Cough       HPI:    William Ba  is a 86 year old (1935), who is being seen today for an episodic care visit.  HPI information obtained from: facility chart records, facility staff, patient report and [facility] chart review. Today's concern is: cerumen, cough, insomnia. Has had ongoing cerumen R ear. Had repeat course of debrox gtts. Staff reports recent cough.  Has copd. Cont. On breo, alb MDI. O2 sats stable. Afebrile.  No recent reports of wheezing. No reports of ACUNA.  Has had some recent decrease in act. Level.  Per staff, has been up more at hs. Daytime routine has been off as a result. Not currently taking med for insomnia.  Lives with spouse, unclear if her insomnia contributing to resident's increased insomnia.       Past Medical and Surgical History reviewed in Epic today.    MEDICATIONS:    Current Outpatient Medications   Medication Sig Dispense Refill     albuterol (PROAIR HFA/PROVENTIL HFA/VENTOLIN HFA) 108 (90 Base) MCG/ACT inhaler Inhale 2 puffs into the lungs every 4 hours as needed for shortness of breath / dyspnea or wheezing       aspirin 81 MG EC tablet Take 81 mg by mouth daily        atorvastatin (LIPITOR) 40 MG tablet Take 1 tablet (40 mg) by mouth At Bedtime 90 tablet 3     donepezil (ARICEPT) 5 MG tablet Take 5 mg by mouth At Bedtime       EPINEPHrine (ANY BX GENERIC EQUIV) 0.3 MG/0.3ML injection 2-pack Inject 0.3 mLs (0.3 mg) into the muscle as needed for anaphylaxis 1 each 3     famotidine (PEPCID) 10 MG tablet Take 10 mg by mouth every evening       fluticasone-vilanterol (BREO ELLIPTA) 100-25 MCG/INH inhaler Inhale 1 puff into the lungs daily 1 Inhaler 11      metoprolol succinate ER (TOPROL-XL) 50 MG 24 hr tablet Take 1 tablet (50 mg) by mouth daily 90 tablet 3     Misc Natural Products (OSTEO BI-FLEX ADV TRIPLE ST) TABS Take 1 capsule by mouth 2 times daily        Multiple Vitamins-Minerals (MULTIVITAMIN ADULT PO) Take one(1) tablet daily.       rivaroxaban ANTICOAGULANT (XARELTO) 20 MG TABS tablet Take 1 tablet (20 mg) by mouth daily (with dinner) 90 tablet 3     Saw Palmetto 160 MG CAPS Take 1 capsule by mouth daily            REVIEW OF SYSTEMS:  Unobtainable secondary to cognitive impairment. Reports feeling ok today. No sob    Objective:  /66   Pulse 68   Temp 97.8  F (36.6  C)   Resp 18   SpO2 94%   Exam:  GENERAL APPEARANCE:  Alert, in no distress, cooperative  ENT:  Mouth and posterior oropharynx normal, moist mucous membranes, [place], R ear canal free of cerumen s/p removal with curette  EYES:  EOM, conjunctivae, lids, pupils and irises normal, PERRL  RESP:  respiratory effort and palpation of chest normal, lungs clear to auscultation , no respiratory distress  CV:  Palpation and auscultation of heart done , regular rate and rhythm, no murmur, rub, or gallop, no edema  ABDOMEN:  normal bowel sounds, soft, nontender, no hepatosplenomegaly or other masses, no guarding or rebound  M/S:   Gait and station normal  muscle strength 5/5 all 4 ext., normal tone  NEURO:   Cranial nerves 2-12 are normal tested and grossly at patient's baseline, speech clear, no tremor  PSYCH:  insight and judgement impaired, memory impaired , affect and mood normal    Labs:     Most Recent 3 CBC's:Recent Labs   Lab Test 06/14/21  1005 09/23/20  0000 08/28/20  1649 07/01/20  1135   WBC 9.3  --  8.7 10.5   HGB 13.9 13.5 12.5* 12.7*   MCV 99  --  101* 97.4   *  --  127* 161     Most Recent 3 BMP's:Recent Labs   Lab Test 06/14/21  1005 09/23/20  0000 08/28/20  1649 06/17/20  0613    142.0 141 141   POTASSIUM 4.1 4.72 4.3 3.6   CHLORIDE 111* 107.7 110* 108   CO2 27 29.6 27  27   BUN 18 16  17.6 20 14   CR 0.97 0.91 0.87 0.88   ANIONGAP 3  --  4 6   NIEVES 8.9 9.3 8.7 8.1*   GLC 90 94 87 102*       ASSESSMENT/PLAN:  (H61.21) Impacted cerumen of right ear  (primary encounter diagnosis)  Comment: R ear canal free of cerumen s/p removal  Plan: 1. Monitor for further decrease in hearing acuity  2. Check for cerumen over next few mos  3. Repeat debrox prn  4. Irrigate prn    (R05.9) Cough  Comment: newer onset.  Afebrile. O2 sats stable  Plan: 1. Cont. breo  2. Cont. Alb MDI  3. Follow O2 sats, temps  4. Monitor for wheezing.  5. For fever, check CXR    (F51.01) Primary insomnia  Comment: recent increase in s/s  Plan: 1. Cont to invite to activities throughout the day  2. Staff to monitor spouse-roommate for increased insomnia  3. Reassess over next couple weeks    Electronically signed by:  EYAD Diaz CNP                 Sincerely,        EYAD Diaz CNP

## 2021-12-15 NOTE — PROGRESS NOTES
[place] [facility]  [place] [facility] Record Number:  [id number]  [facility] of Service where encounter took place[facility] [233]  Chief Complaint   Patient presents with     Cerumen (impacted)     Cough       HPI:    William Ba  is a 86 year old (1935), who is being seen today for an episodic care visit.  HPI information obtained from: facility chart records, facility staff, patient report and [facility] chart review. Today's concern is: cerumen, cough, insomnia. Has had ongoing cerumen R ear. Had repeat course of debrox gtts. Staff reports recent cough.  Has copd. Cont. On breo, alb MDI. O2 sats stable. Afebrile.  No recent reports of wheezing. No reports of ACUNA.  Has had some recent decrease in act. Level.  Per staff, has been up more at hs. Daytime routine has been off as a result. Not currently taking med for insomnia.  Lives with spouse, unclear if her insomnia contributing to resident's increased insomnia.       Past Medical and Surgical History reviewed in Epic today.    MEDICATIONS:    Current Outpatient Medications   Medication Sig Dispense Refill     albuterol (PROAIR HFA/PROVENTIL HFA/VENTOLIN HFA) 108 (90 Base) MCG/ACT inhaler Inhale 2 puffs into the lungs every 4 hours as needed for shortness of breath / dyspnea or wheezing       aspirin 81 MG EC tablet Take 81 mg by mouth daily        atorvastatin (LIPITOR) 40 MG tablet Take 1 tablet (40 mg) by mouth At Bedtime 90 tablet 3     donepezil (ARICEPT) 5 MG tablet Take 5 mg by mouth At Bedtime       EPINEPHrine (ANY BX GENERIC EQUIV) 0.3 MG/0.3ML injection 2-pack Inject 0.3 mLs (0.3 mg) into the muscle as needed for anaphylaxis 1 each 3     famotidine (PEPCID) 10 MG tablet Take 10 mg by mouth every evening       fluticasone-vilanterol (BREO ELLIPTA) 100-25 MCG/INH inhaler Inhale 1 puff into the lungs daily 1 Inhaler 11     metoprolol succinate ER (TOPROL-XL) 50 MG 24 hr tablet Take 1 tablet (50 mg) by mouth daily 90  tablet 3     Misc Natural Products (OSTEO BI-FLEX ADV TRIPLE ST) TABS Take 1 capsule by mouth 2 times daily        Multiple Vitamins-Minerals (MULTIVITAMIN ADULT PO) Take one(1) tablet daily.       rivaroxaban ANTICOAGULANT (XARELTO) 20 MG TABS tablet Take 1 tablet (20 mg) by mouth daily (with dinner) 90 tablet 3     Saw Palmetto 160 MG CAPS Take 1 capsule by mouth daily            REVIEW OF SYSTEMS:  Unobtainable secondary to cognitive impairment. Reports feeling ok today. No sob    Objective:  /66   Pulse 68   Temp 97.8  F (36.6  C)   Resp 18   SpO2 94%   Exam:  GENERAL APPEARANCE:  Alert, in no distress, cooperative  ENT:  Mouth and posterior oropharynx normal, moist mucous membranes, [place], R ear canal free of cerumen s/p removal with curette  EYES:  EOM, conjunctivae, lids, pupils and irises normal, PERRL  RESP:  respiratory effort and palpation of chest normal, lungs clear to auscultation , no respiratory distress  CV:  Palpation and auscultation of heart done , regular rate and rhythm, no murmur, rub, or gallop, no edema  ABDOMEN:  normal bowel sounds, soft, nontender, no hepatosplenomegaly or other masses, no guarding or rebound  M/S:   Gait and station normal  muscle strength 5/5 all 4 ext., normal tone  NEURO:   Cranial nerves 2-12 are normal tested and grossly at patient's baseline, speech clear, no tremor  PSYCH:  insight and judgement impaired, memory impaired , affect and mood normal    Labs:     Most Recent 3 CBC's:Recent Labs   Lab Test 06/14/21  1005 09/23/20  0000 08/28/20  1649 07/01/20  1135   WBC 9.3  --  8.7 10.5   HGB 13.9 13.5 12.5* 12.7*   MCV 99  --  101* 97.4   *  --  127* 161     Most Recent 3 BMP's:Recent Labs   Lab Test 06/14/21  1005 09/23/20  0000 08/28/20  1649 06/17/20  0613    142.0 141 141   POTASSIUM 4.1 4.72 4.3 3.6   CHLORIDE 111* 107.7 110* 108   CO2 27 29.6 27 27   BUN 18 16  17.6 20 14   CR 0.97 0.91 0.87 0.88   ANIONGAP 3  --  4 6   NIEVES 8.9 9.3 8.7  8.1*   GLC 90 94 87 102*       ASSESSMENT/PLAN:  (H61.21) Impacted cerumen of right ear  (primary encounter diagnosis)  Comment: R ear canal free of cerumen s/p removal  Plan: 1. Monitor for further decrease in hearing acuity  2. Check for cerumen over next few mos  3. Repeat debrox prn  4. Irrigate prn    (R05.9) Cough  Comment: newer onset.  Afebrile. O2 sats stable  Plan: 1. Cont. breo  2. Cont. Alb MDI  3. Follow O2 sats, temps  4. Monitor for wheezing.  5. For fever, check CXR    (F51.01) Primary insomnia  Comment: recent increase in s/s  Plan: 1. Cont to invite to activities throughout the day  2. Staff to monitor spouse-roommate for increased insomnia  3. Reassess over next couple weeks    Electronically signed by:  EYAD Diaz CNP

## 2022-01-01 ENCOUNTER — HEALTH MAINTENANCE LETTER (OUTPATIENT)
Age: 87
End: 2022-01-01

## 2022-01-01 ENCOUNTER — TELEPHONE (OUTPATIENT)
Dept: GERIATRICS | Facility: CLINIC | Age: 87
End: 2022-01-01

## 2022-01-01 ENCOUNTER — MEDICAL CORRESPONDENCE (OUTPATIENT)
Dept: HEALTH INFORMATION MANAGEMENT | Facility: CLINIC | Age: 87
End: 2022-01-01

## 2022-01-01 ENCOUNTER — ASSISTED LIVING VISIT (OUTPATIENT)
Dept: GERIATRICS | Facility: CLINIC | Age: 87
End: 2022-01-01
Payer: MEDICARE

## 2022-01-01 ENCOUNTER — APPOINTMENT (OUTPATIENT)
Dept: CT IMAGING | Facility: CLINIC | Age: 87
End: 2022-01-01
Attending: EMERGENCY MEDICINE
Payer: MEDICARE

## 2022-01-01 ENCOUNTER — HOSPITAL ENCOUNTER (EMERGENCY)
Facility: CLINIC | Age: 87
Discharge: HOME | End: 2022-08-28
Attending: EMERGENCY MEDICINE | Admitting: EMERGENCY MEDICINE
Payer: MEDICARE

## 2022-01-01 ENCOUNTER — LAB REQUISITION (OUTPATIENT)
Dept: LAB | Facility: CLINIC | Age: 87
End: 2022-01-01
Payer: MEDICARE

## 2022-01-01 VITALS
OXYGEN SATURATION: 94 % | RESPIRATION RATE: 18 BRPM | DIASTOLIC BLOOD PRESSURE: 77 MMHG | HEIGHT: 71 IN | TEMPERATURE: 98 F | BODY MASS INDEX: 23.51 KG/M2 | SYSTOLIC BLOOD PRESSURE: 127 MMHG | HEART RATE: 84 BPM

## 2022-01-01 VITALS
RESPIRATION RATE: 18 BRPM | HEART RATE: 65 BPM | DIASTOLIC BLOOD PRESSURE: 114 MMHG | WEIGHT: 166.2 LBS | SYSTOLIC BLOOD PRESSURE: 129 MMHG | TEMPERATURE: 98.1 F | OXYGEN SATURATION: 97 % | BODY MASS INDEX: 23.51 KG/M2

## 2022-01-01 VITALS
OXYGEN SATURATION: 90 % | DIASTOLIC BLOOD PRESSURE: 68 MMHG | BODY MASS INDEX: 22.92 KG/M2 | TEMPERATURE: 97.9 F | RESPIRATION RATE: 20 BRPM | SYSTOLIC BLOOD PRESSURE: 129 MMHG | HEIGHT: 71 IN | HEART RATE: 73 BPM

## 2022-01-01 VITALS
BODY MASS INDEX: 22.92 KG/M2 | HEART RATE: 58 BPM | DIASTOLIC BLOOD PRESSURE: 67 MMHG | HEIGHT: 71 IN | OXYGEN SATURATION: 94 % | TEMPERATURE: 98 F | SYSTOLIC BLOOD PRESSURE: 118 MMHG | RESPIRATION RATE: 16 BRPM

## 2022-01-01 VITALS
HEART RATE: 89 BPM | DIASTOLIC BLOOD PRESSURE: 77 MMHG | HEIGHT: 71 IN | BODY MASS INDEX: 23.51 KG/M2 | SYSTOLIC BLOOD PRESSURE: 129 MMHG | TEMPERATURE: 98.1 F | RESPIRATION RATE: 18 BRPM | OXYGEN SATURATION: 92 %

## 2022-01-01 VITALS
DIASTOLIC BLOOD PRESSURE: 77 MMHG | HEART RATE: 63 BPM | SYSTOLIC BLOOD PRESSURE: 138 MMHG | RESPIRATION RATE: 18 BRPM | OXYGEN SATURATION: 93 %

## 2022-01-01 VITALS — HEIGHT: 71 IN | BODY MASS INDEX: 23.51 KG/M2

## 2022-01-01 DIAGNOSIS — C61 MALIGNANT NEOPLASM OF PROSTATE (H): ICD-10-CM

## 2022-01-01 DIAGNOSIS — I10 ESSENTIAL (PRIMARY) HYPERTENSION: ICD-10-CM

## 2022-01-01 DIAGNOSIS — I48.91 ATRIAL FIBRILLATION, UNSPECIFIED TYPE (H): ICD-10-CM

## 2022-01-01 DIAGNOSIS — R31.0 GROSS HEMATURIA: Primary | ICD-10-CM

## 2022-01-01 DIAGNOSIS — J44.9 CHRONIC OBSTRUCTIVE PULMONARY DISEASE, UNSPECIFIED COPD TYPE (H): Primary | ICD-10-CM

## 2022-01-01 DIAGNOSIS — J44.9 CHRONIC OBSTRUCTIVE PULMONARY DISEASE, UNSPECIFIED COPD TYPE (H): ICD-10-CM

## 2022-01-01 DIAGNOSIS — J18.9 PNEUMONIA OF BOTH LOWER LOBES DUE TO INFECTIOUS ORGANISM: Primary | ICD-10-CM

## 2022-01-01 DIAGNOSIS — J18.9 COMMUNITY ACQUIRED PNEUMONIA OF LEFT LOWER LOBE OF LUNG: ICD-10-CM

## 2022-01-01 DIAGNOSIS — K21.9 GASTROESOPHAGEAL REFLUX DISEASE, UNSPECIFIED WHETHER ESOPHAGITIS PRESENT: ICD-10-CM

## 2022-01-01 DIAGNOSIS — R05.9 COUGH, UNSPECIFIED TYPE: Primary | ICD-10-CM

## 2022-01-01 DIAGNOSIS — F03.90 DEMENTIA WITHOUT BEHAVIORAL DISTURBANCE, UNSPECIFIED DEMENTIA TYPE: ICD-10-CM

## 2022-01-01 DIAGNOSIS — N30.01 ACUTE CYSTITIS WITH HEMATURIA: ICD-10-CM

## 2022-01-01 DIAGNOSIS — J43.9 PULMONARY EMPHYSEMA, UNSPECIFIED EMPHYSEMA TYPE (H): Primary | ICD-10-CM

## 2022-01-01 DIAGNOSIS — H61.21 IMPACTED CERUMEN OF RIGHT EAR: Primary | ICD-10-CM

## 2022-01-01 DIAGNOSIS — F51.01 PRIMARY INSOMNIA: ICD-10-CM

## 2022-01-01 LAB
ALBUMIN UR-MCNC: 100 MG/DL
AMORPH CRY #/AREA URNS HPF: ABNORMAL /HPF
ANION GAP SERPL CALCULATED.3IONS-SCNC: 7 MMOL/L (ref 7–15)
APPEARANCE UR: ABNORMAL
BACTERIA UR CULT: NO GROWTH
BASOPHILS # BLD AUTO: 0 10E3/UL (ref 0–0.2)
BASOPHILS NFR BLD AUTO: 0 %
BILIRUB UR QL STRIP: NEGATIVE
BUN SERPL-MCNC: 23.3 MG/DL (ref 8–23)
CALCIUM SERPL-MCNC: 8.8 MG/DL (ref 8.8–10.2)
CHLORIDE SERPL-SCNC: 107 MMOL/L (ref 98–107)
COLOR UR AUTO: ABNORMAL
CREAT SERPL-MCNC: 0.77 MG/DL (ref 0.67–1.17)
DEPRECATED HCO3 PLAS-SCNC: 25 MMOL/L (ref 22–29)
EOSINOPHIL # BLD AUTO: 0.1 10E3/UL (ref 0–0.7)
EOSINOPHIL NFR BLD AUTO: 2 %
ERYTHROCYTE [DISTWIDTH] IN BLOOD BY AUTOMATED COUNT: 15.2 % (ref 10–15)
GFR SERPL CREATININE-BSD FRML MDRD: 87 ML/MIN/1.73M2
GLUCOSE SERPL-MCNC: 99 MG/DL (ref 70–99)
GLUCOSE UR STRIP-MCNC: NEGATIVE MG/DL
HCT VFR BLD AUTO: 36.8 % (ref 40–53)
HGB BLD-MCNC: 11.6 G/DL (ref 13.3–17.7)
HGB BLD-MCNC: 12.4 G/DL (ref 13.3–17.7)
HGB UR QL STRIP: ABNORMAL
IMM GRANULOCYTES # BLD: 0 10E3/UL
IMM GRANULOCYTES NFR BLD: 0 %
KETONES UR STRIP-MCNC: NEGATIVE MG/DL
LEUKOCYTE ESTERASE UR QL STRIP: ABNORMAL
LYMPHOCYTES # BLD AUTO: 1.8 10E3/UL (ref 0.8–5.3)
LYMPHOCYTES NFR BLD AUTO: 23 %
MCH RBC QN AUTO: 32.1 PG (ref 26.5–33)
MCHC RBC AUTO-ENTMCNC: 31.5 G/DL (ref 31.5–36.5)
MCV RBC AUTO: 102 FL (ref 78–100)
MONOCYTES # BLD AUTO: 0.8 10E3/UL (ref 0–1.3)
MONOCYTES NFR BLD AUTO: 10 %
MUCOUS THREADS #/AREA URNS LPF: PRESENT /LPF
NEUTROPHILS # BLD AUTO: 5.2 10E3/UL (ref 1.6–8.3)
NEUTROPHILS NFR BLD AUTO: 65 %
NITRATE UR QL: NEGATIVE
NRBC # BLD AUTO: 0 10E3/UL
NRBC BLD AUTO-RTO: 0 /100
PH UR STRIP: 5.5 [PH] (ref 5–7)
PLATELET # BLD AUTO: 135 10E3/UL (ref 150–450)
POTASSIUM SERPL-SCNC: 4.5 MMOL/L (ref 3.4–5.3)
PSA SERPL-MCNC: <0.01 NG/ML
RBC # BLD AUTO: 3.61 10E6/UL (ref 4.4–5.9)
RBC URINE: >100 /HPF
SODIUM SERPL-SCNC: 139 MMOL/L (ref 136–145)
SP GR UR STRIP: >1.035 (ref 1–1.03)
UROBILINOGEN UR STRIP-MCNC: NORMAL MG/DL
WBC # BLD AUTO: 8 10E3/UL (ref 4–11)
WBC CLUMPS #/AREA URNS HPF: PRESENT /HPF
WBC URINE: >100 /HPF

## 2022-01-01 PROCEDURE — 85004 AUTOMATED DIFF WBC COUNT: CPT | Performed by: EMERGENCY MEDICINE

## 2022-01-01 PROCEDURE — 81001 URINALYSIS AUTO W/SCOPE: CPT | Performed by: EMERGENCY MEDICINE

## 2022-01-01 PROCEDURE — 250N000013 HC RX MED GY IP 250 OP 250 PS 637: Performed by: EMERGENCY MEDICINE

## 2022-01-01 PROCEDURE — 250N000011 HC RX IP 250 OP 636: Performed by: EMERGENCY MEDICINE

## 2022-01-01 PROCEDURE — 36415 COLL VENOUS BLD VENIPUNCTURE: CPT | Mod: ORL | Performed by: NURSE PRACTITIONER

## 2022-01-01 PROCEDURE — G0103 PSA SCREENING: HCPCS | Mod: ORL | Performed by: NURSE PRACTITIONER

## 2022-01-01 PROCEDURE — 36415 COLL VENOUS BLD VENIPUNCTURE: CPT | Performed by: EMERGENCY MEDICINE

## 2022-01-01 PROCEDURE — 80048 BASIC METABOLIC PNL TOTAL CA: CPT | Performed by: EMERGENCY MEDICINE

## 2022-01-01 PROCEDURE — 250N000009 HC RX 250: Performed by: EMERGENCY MEDICINE

## 2022-01-01 PROCEDURE — 87086 URINE CULTURE/COLONY COUNT: CPT | Performed by: EMERGENCY MEDICINE

## 2022-01-01 PROCEDURE — 70450 CT HEAD/BRAIN W/O DYE: CPT | Mod: MA

## 2022-01-01 PROCEDURE — 74178 CT ABD&PLV WO CNTR FLWD CNTR: CPT | Mod: MA

## 2022-01-01 PROCEDURE — 99285 EMERGENCY DEPT VISIT HI MDM: CPT | Mod: 25

## 2022-01-01 PROCEDURE — P9603 ONE-WAY ALLOW PRORATED MILES: HCPCS | Mod: ORL | Performed by: NURSE PRACTITIONER

## 2022-01-01 PROCEDURE — 85018 HEMOGLOBIN: CPT | Mod: ORL | Performed by: NURSE PRACTITIONER

## 2022-01-01 RX ORDER — CEFDINIR 250 MG/5ML
300 POWDER, FOR SUSPENSION ORAL ONCE
Status: COMPLETED | OUTPATIENT
Start: 2022-01-01 | End: 2022-01-01

## 2022-01-01 RX ORDER — IOPAMIDOL 755 MG/ML
500 INJECTION, SOLUTION INTRAVASCULAR ONCE
Status: COMPLETED | OUTPATIENT
Start: 2022-01-01 | End: 2022-01-01

## 2022-01-01 RX ORDER — CEFDINIR 300 MG/1
300 CAPSULE ORAL 2 TIMES DAILY
Qty: 14 CAPSULE | Refills: 0 | Status: SHIPPED | OUTPATIENT
Start: 2022-01-01 | End: 2022-01-01

## 2022-01-01 RX ORDER — IPRATROPIUM BROMIDE AND ALBUTEROL SULFATE 2.5; .5 MG/3ML; MG/3ML
1 SOLUTION RESPIRATORY (INHALATION)
COMMUNITY
Start: 2022-01-01 | End: 2022-01-01

## 2022-01-01 RX ORDER — IPRATROPIUM BROMIDE AND ALBUTEROL SULFATE 2.5; .5 MG/3ML; MG/3ML
SOLUTION RESPIRATORY (INHALATION)
Start: 2022-01-01 | End: 2023-01-01

## 2022-01-01 RX ADMIN — IOPAMIDOL 80 ML: 755 INJECTION, SOLUTION INTRAVENOUS at 18:55

## 2022-01-01 RX ADMIN — CEFDINIR 300 MG: 250 POWDER, FOR SUSPENSION ORAL at 20:29

## 2022-01-01 RX ADMIN — SODIUM CHLORIDE 80 ML: 9 INJECTION, SOLUTION INTRAVENOUS at 18:55

## 2022-01-01 ASSESSMENT — ACTIVITIES OF DAILY LIVING (ADL)
ADLS_ACUITY_SCORE: 17.25
ADLS_ACUITY_SCORE: 16.25

## 2022-01-21 ENCOUNTER — ASSISTED LIVING VISIT (OUTPATIENT)
Dept: GERIATRICS | Facility: CLINIC | Age: 87
End: 2022-01-21
Payer: MEDICARE

## 2022-01-21 VITALS
RESPIRATION RATE: 18 BRPM | SYSTOLIC BLOOD PRESSURE: 122 MMHG | DIASTOLIC BLOOD PRESSURE: 74 MMHG | TEMPERATURE: 98.1 F | HEART RATE: 82 BPM | OXYGEN SATURATION: 90 %

## 2022-01-21 DIAGNOSIS — R05.9 COUGH: ICD-10-CM

## 2022-01-21 DIAGNOSIS — R19.7 DIARRHEA, UNSPECIFIED TYPE: ICD-10-CM
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
## 2022-01-21 DIAGNOSIS — F03.90 DEMENTIA WITHOUT BEHAVIORAL DISTURBANCE, UNSPECIFIED DEMENTIA TYPE: ICD-10-CM

## 2022-01-21 RX ORDER — AZITHROMYCIN 250 MG/1
TABLET, FILM COATED ORAL
Qty: 6 TABLET | Refills: 0 | Status: SHIPPED | OUTPATIENT
Start: 2022-01-21 | End: 2022-01-26

## 2022-01-21 NOTE — PROGRESS NOTES
[place] [facility]  [place] Medical Record Number:  [id number]  [facility] [[id number][facility]  Chief Complaint   Patient presents with     Fall     Cough       HPI:    William Ba  is a 86 year old (1935), who is being seen today for an episodic care visit.  HPI information obtained from: facility chart records, facility staff, patient report and [facility] chart review. Today's concern is:  Falls, cough, dementia, diarrhea.  S/p fall at some annie during the night, am. Skin tear RUE. Resident reports getting up too fast from bed.  No apparent head trauma.  No reports of pain. BP stable.  Cont. On metoprolol for HTN. Neuros stable.  Has cough. Afebrile. Cont. On breo, prn alb. MDI for copd. Cont. On aricept for dementia. Dose decrease 11/21 due to diarrhea.  No recent diarrhea.  Has increased confusion at times. Mood gen. Stable.     Past Medical and Surgical History reviewed in Epic today.    MEDICATIONS:    Current Outpatient Medications   Medication Sig Dispense Refill     albuterol (PROAIR HFA/PROVENTIL HFA/VENTOLIN HFA) 108 (90 Base) MCG/ACT inhaler Inhale 2 puffs into the lungs every 4 hours as needed for shortness of breath / dyspnea or wheezing       aspirin 81 MG EC tablet Take 81 mg by mouth daily        atorvastatin (LIPITOR) 40 MG tablet Take 1 tablet (40 mg) by mouth At Bedtime 90 tablet 3     donepezil (ARICEPT) 5 MG tablet Take 5 mg by mouth At Bedtime       EPINEPHrine (ANY BX GENERIC EQUIV) 0.3 MG/0.3ML injection 2-pack Inject 0.3 mLs (0.3 mg) into the muscle as needed for anaphylaxis 1 each 3     famotidine (PEPCID) 10 MG tablet Take 10 mg by mouth every evening       fluticasone-vilanterol (BREO ELLIPTA) 100-25 MCG/INH inhaler Inhale 1 puff into the lungs daily 1 Inhaler 11     metoprolol succinate ER (TOPROL-XL) 50 MG 24 hr tablet Take 1 tablet (50 mg) by mouth daily 90 tablet 3     [facility] Natural Products (OSTEO BI-FLEX ADV  TRIPLE ST) TABS Take 1 capsule by mouth 2 times daily        Multiple Vitamins-Minerals (MULTIVITAMIN ADULT PO) Take one(1) tablet daily.       rivaroxaban ANTICOAGULANT (XARELTO) 20 MG TABS tablet Take 1 tablet (20 mg) by mouth daily (with dinner) 90 tablet 3     Saw Palmetto 160 MG CAPS Take 1 capsule by mouth daily            REVIEW OF SYSTEMS:  Unobtainable secondary to cognitive impairment. Reports feeling ok    Objective:  /74   Pulse 82   Temp 98.1  F (36.7  C)   Resp 18   SpO2 90%   Exam:  GENERAL APPEARANCE:  Alert, in no distress, cooperative  ENT:  Mouth and posterior oropharynx normal, moist mucous membranes, [place]  EYES:  EOM, conjunctivae, lids, pupils and irises normal, PERRL  NECK:  No adenopathy,masses or thyromegaly, no carotid bruit, FROM  RESP:  respiratory effort and palpation of chest normal, no respiratory distress, occ cough-loose  CV:  Palpation and auscultation of heart done , regular rate and rhythm, no murmur, rub, or gallop, no edema  ABDOMEN:  normal bowel sounds, soft, nontender, no hepatosplenomegaly or other masses, no guarding or rebound  M/S:   Gait and station normal  muscle strength 5/5 all 4 ext., no apparent pain with ROM all  4 ext.  SKIN:  skin tear R bicep. 4.0x1cm. approximated with steri strips. no current drainage  NEURO:   Cranial nerves 2-12 are normal tested and grossly at patient's baseline, speech clear, no tremor  PSYCH:  insight and judgement impaired, memory impaired , affect and mood normal    Labs:     Most Recent 3 CBC's:Recent Labs   Lab Test 06/14/21  1005 09/23/20  0000 08/28/20  1649 07/01/20  1135   WBC 9.3  --  8.7 10.5   HGB 13.9 13.5 12.5* 12.7*   MCV 99  --  101* 97.4   *  --  127* 161     Most Recent 3 BMP's:Recent Labs   Lab Test 06/14/21  1005 09/23/20  0000 08/28/20  1649 06/17/20  0613    142.0 141 141   POTASSIUM 4.1 4.72 4.3 3.6   CHLORIDE 111* 107.7 110* 108   CO2 27 29.6 27 27   BUN 18 16  17.6 20 14   CR 0.97 0.91 0.87  0.88   ANIONGAP 3  --  4 6   NIEVES 8.9 9.3 8.7 8.1*   GLC 90 94 87 102*       ASSESSMENT/PLAN:  (W19.XXXA) Fall, initial encounter  (primary encounter diagnosis)  Comment: not witnessed. Resident reports getting up too quickly from bed. Skin tear RUE. No apparent head trauma  Plan: 1. telfa drsg to skin tear, monitor site for infection  2. Follow Neuros  3.monitor for pain, changes in gait    (R05.9) Cough  Comment: newer onset. Afebrile. No recent resp. Distress. No current wheezing  Plan: CXR today  2. Cont. breo  3. For wheezing may sched. Alb angie  4. Follow O2 sats  5. mucinex x 5 days    (F03.90) Dementia without behavioral disturbance, unspecified dementia type (H)  Comment: memory loss. Increased confusion at times. 11/21 decrease in aricept  Plan: 1. Cont. aricept  2. Monitor for changes in mood, behaviors  3. Follow po intake, wt.s    (R19.7) Diarrhea, unspecified type  Comment: stable since decrease in aricept  Plan: 1. Monitor for increased s/s  2. Encourage fluids    Electronically signed by:  EYAD Diaz CNP

## 2022-01-21 NOTE — LETTER
1/21/2022        RE: William Ba  [address] [address] [address]        [place] [facility]  [place] Medical Record Number:  [id number]  [facility] of [facility] where encounter took place[facility] ([facility]) [233]  Chief Complaint   Patient presents with     Fall     Cough       HPI:    William Ba  is a 86 year old (1935), who is being seen today for an episodic care visit.  HPI information obtained from: facility chart records, facility staff, patient report and [facility] chart review. Today's concern is:  Falls, cough, dementia, diarrhea.  S/p fall at some annie during the night, am. Skin tear RUE. Resident reports getting up too fast from bed.  No apparent head trauma.  No reports of pain. BP stable.  Cont. On metoprolol for HTN. Neuros stable.  Has cough. Afebrile. Cont. On breo, prn alb. MDI for copd. Cont. On aricept for dementia. Dose decrease 11/21 due to diarrhea.  No recent diarrhea.  Has increased confusion at times. Mood gen. Stable.     Past Medical and Surgical History reviewed in Epic today.    MEDICATIONS:    Current Outpatient Medications   Medication Sig Dispense Refill     albuterol (PROAIR HFA/PROVENTIL HFA/VENTOLIN HFA) 108 (90 Base) MCG/ACT inhaler Inhale 2 puffs into the lungs every 4 hours as needed for shortness of breath / dyspnea or wheezing       aspirin 81 MG EC tablet Take 81 mg by mouth daily        atorvastatin (LIPITOR) 40 MG tablet Take 1 tablet (40 mg) by mouth At Bedtime 90 tablet 3     donepezil (ARICEPT) 5 MG tablet Take 5 mg by mouth At Bedtime       EPINEPHrine (ANY BX GENERIC EQUIV) 0.3 MG/0.3ML injection 2-pack Inject 0.3 mLs (0.3 mg) into the muscle as needed for anaphylaxis 1 each 3     famotidine (PEPCID) 10 MG tablet Take 10 mg by mouth every evening       fluticasone-vilanterol (BREO ELLIPTA) 100-25 MCG/INH inhaler Inhale 1 puff into the lungs daily 1 Inhaler 11     metoprolol succinate ER (TOPROL-XL) 50 MG 24 hr tablet  Take 1 tablet (50 mg) by mouth daily 90 tablet 3     Misc Natural Products (OSTEO BI-FLEX ADV TRIPLE ST) TABS Take 1 capsule by mouth 2 times daily        Multiple Vitamins-Minerals (MULTIVITAMIN ADULT PO) Take one(1) tablet daily.       rivaroxaban ANTICOAGULANT (XARELTO) 20 MG TABS tablet Take 1 tablet (20 mg) by mouth daily (with dinner) 90 tablet 3     Saw Palmetto 160 MG CAPS Take 1 capsule by mouth daily            REVIEW OF SYSTEMS:  Unobtainable secondary to cognitive impairment. Reports feeling ok    Objective:  /74   Pulse 82   Temp 98.1  F (36.7  C)   Resp 18   SpO2 90%   Exam:  GENERAL APPEARANCE:  Alert, in no distress, cooperative  ENT:  Mouth and posterior oropharynx normal, moist mucous membranes, [place]  EYES:  EOM, conjunctivae, lids, pupils and irises normal, PERRL  NECK:  No adenopathy,masses or thyromegaly, no carotid bruit, FROM  RESP:  respiratory effort and palpation of chest normal, no respiratory distress, occ cough-loose  CV:  Palpation and auscultation of heart done , regular rate and rhythm, no murmur, rub, or gallop, no edema  ABDOMEN:  normal bowel sounds, soft, nontender, no hepatosplenomegaly or other masses, no guarding or rebound  M/S:   Gait and station normal  muscle strength 5/5 all 4 ext., no apparent pain with ROM all  4 ext.  SKIN:  skin tear R bicep. 4.0x1cm. approximated with steri strips. no current drainage  NEURO:   Cranial nerves 2-12 are normal tested and grossly at patient's baseline, speech clear, no tremor  PSYCH:  insight and judgement impaired, memory impaired , affect and mood normal    Labs:     Most Recent 3 CBC's:Recent Labs   Lab Test 06/14/21  1005 09/23/20  0000 08/28/20  1649 07/01/20  1135   WBC 9.3  --  8.7 10.5   HGB 13.9 13.5 12.5* 12.7*   MCV 99  --  101* 97.4   *  --  127* 161     Most Recent 3 BMP's:Recent Labs   Lab Test 06/14/21  1005 09/23/20  0000 08/28/20  1649 06/17/20  0613    142.0 141 141   POTASSIUM 4.1 4.72 4.3 3.6    CHLORIDE 111* 107.7 110* 108   CO2 27 29.6 27 27   BUN 18 16  17.6 20 14   CR 0.97 0.91 0.87 0.88   ANIONGAP 3  --  4 6   NIEVES 8.9 9.3 8.7 8.1*   GLC 90 94 87 102*       ASSESSMENT/PLAN:  (W19.XXXA) Fall, initial encounter  (primary encounter diagnosis)  Comment: not witnessed. Resident reports getting up too quickly from bed. Skin tear RUE. No apparent head trauma  Plan: 1. telfa drsg to skin tear, monitor site for infection  2. Follow Neuros  3.monitor for pain, changes in gait    (R05.9) Cough  Comment: newer onset. Afebrile. No recent resp. Distress. No current wheezing  Plan: CXR today  2. Cont. breo  3. For wheezing may sched. Alb angie  4. Follow O2 sats  5. mucinex x 5 days    (F03.90) Dementia without behavioral disturbance, unspecified dementia type (H)  Comment: memory loss. Increased confusion at times. 11/21 decrease in aricept  Plan: 1. Cont. aricept  2. Monitor for changes in mood, behaviors  3. Follow po intake, wt.s    (R19.7) Diarrhea, unspecified type  Comment: stable since decrease in aricept  Plan: 1. Monitor for increased s/s  2. Encourage fluids    Electronically signed by:  EYAD Diaz CNP                 Sincerely,        EYAD Diaz CNP

## 2022-01-26 ENCOUNTER — ASSISTED LIVING VISIT (OUTPATIENT)
Dept: GERIATRICS | Facility: CLINIC | Age: 87
End: 2022-01-26
Payer: MEDICARE

## 2022-01-26 VITALS
RESPIRATION RATE: 16 BRPM | OXYGEN SATURATION: 95 % | DIASTOLIC BLOOD PRESSURE: 77 MMHG | HEART RATE: 67 BPM | TEMPERATURE: 97.7 F | SYSTOLIC BLOOD PRESSURE: 124 MMHG

## 2022-01-26 DIAGNOSIS — J18.9 COMMUNITY ACQUIRED PNEUMONIA OF LEFT LOWER LOBE OF LUNG: Primary | ICD-10-CM

## 2022-01-26 DIAGNOSIS — F03.90 DEMENTIA WITHOUT BEHAVIORAL DISTURBANCE, UNSPECIFIED DEMENTIA TYPE: ICD-10-CM

## 2022-01-26 DIAGNOSIS — S51.011S SKIN TEAR OF RIGHT ELBOW WITHOUT COMPLICATION, SEQUELA: ICD-10-CM

## 2022-01-26 NOTE — PROGRESS NOTES
[place] [facility]  [place] Medical Record Number:  [id number]  [facility] [[id number]]  Chief Complaint   Patient presents with     Cough       HPI:    William Ba  is a 86 year old (1935), who is being seen today for an episodic care visit.  HPI information obtained from: facility chart records, facility staff, patient report and [facility] chart review. Today's concern is: CAP, skin tears, dementia.  Had some decreased O2 sats earlier this week. S/p fall 1/21/22. CXR done showed CAP. Started on zpak.  Alb. MDIs started. Cont. On Breo for copd.  O2 sats improved to mid 90s over past 2 days. Has skin tears of R bicep, elbow s/p fall.  Both areas currently scabbed. Had some increase in confusion earlier this week. Improved today s/p tx of CAP.  Cot. On aricept for dementia.       Past Medical and Surgical History reviewed in Epic today.    MEDICATIONS:    Current Outpatient Medications   Medication Sig Dispense Refill     albuterol (PROAIR HFA/PROVENTIL HFA/VENTOLIN HFA) 108 (90 Base) MCG/ACT inhaler Inhale 2 puffs into the lungs every 4 hours as needed for shortness of breath / dyspnea or wheezing       aspirin 81 MG EC tablet Take 81 mg by mouth daily        atorvastatin (LIPITOR) 40 MG tablet Take 1 tablet (40 mg) by mouth At Bedtime 90 tablet 3     azithromycin (ZITHROMAX) 250 MG tablet Take 2 tablets (500 mg) by mouth daily for 1 day, THEN 1 tablet (250 mg) daily for 4 days. 6 tablet 0     donepezil (ARICEPT) 5 MG tablet Take 5 mg by mouth At Bedtime       EPINEPHrine (ANY BX GENERIC EQUIV) 0.3 MG/0.3ML injection 2-pack Inject 0.3 mLs (0.3 mg) into the muscle as needed for anaphylaxis 1 each 3     famotidine (PEPCID) 10 MG tablet Take 10 mg by mouth every evening       fluticasone-vilanterol (BREO ELLIPTA) 100-25 MCG/INH inhaler Inhale 1 puff into the lungs daily 1 Inhaler 11     metoprolol succinate ER (TOPROL-XL) 50 MG 24 hr  tablet Take 1 tablet (50 mg) by mouth daily 90 tablet 3     Misc Natural Products (OSTEO BI-FLEX ADV TRIPLE ST) TABS Take 1 capsule by mouth 2 times daily        Multiple Vitamins-Minerals (MULTIVITAMIN ADULT PO) Take one(1) tablet daily.       rivaroxaban ANTICOAGULANT (XARELTO) 20 MG TABS tablet Take 1 tablet (20 mg) by mouth daily (with dinner) 90 tablet 3     Saw Palmetto 160 MG CAPS Take 1 capsule by mouth daily            REVIEW OF SYSTEMS:  Unobtainable secondary to cognitive impairment. Reports feeling fine today    Objective:  /77   Pulse 67   Temp 97.7  F (36.5  C)   Resp 16   SpO2 95%   Exam:  GENERAL APPEARANCE:  Alert, in no distress, cooperative  ENT:  Mouth and posterior oropharynx normal, moist mucous membranes, [place]  EYES:  EOM, conjunctivae, lids, pupils and irises normal, PERRL  RESP:  respiratory effort and palpation of chest normal, lungs clear to auscultation , no respiratory distress  CV:  Palpation and auscultation of heart done , regular rate and rhythm, no murmur, rub, or gallop, no edema  ABDOMEN:  normal bowel sounds, soft, nontender, no hepatosplenomegaly or other masses, no guarding or rebound  M/S:   Gait and station normal  muscle strength 5/5 all 4 ext, normal tone  NEURO:   Cranial nerves 2-12 are normal tested and grossly at patient's baseline, no tremor  PSYCH:  insight and judgement impaired, memory impaired , affect and mood normal    Labs:   Recent labs in [facility] reviewed by me today.     ASSESSMENT/PLAN:  (J18.9) Community acquired pneumonia of left lower lobe of lung  (primary encounter diagnosis)  Comment: resolving. Afebrile. O2 sats stable  Plan: 1. Complete zpak  2. Cont. Bid alb nebs for now  3. Cont. Breo  4. Follow temps, O2 sats    (S51.011S) Skin tear of right elbow without complication, sequela  Comment: s/p fall 1/21/22  Plan: 1. Discontinue drsgs to both sites-currently scabbed  2. Monitor for drainage, redness  3. Reassess over next week    (F03.90)  Dementia without behavioral disturbance, unspecified dementia type (H)  Comment: memory loss. Some recent increase in confusion, likely to #1, currently stable  Plan: 1. Cont. aricept  2. Monitor for further increased confusion  3. Follow po intake, wt.s    Electronically signed by:  EYAD Diaz CNP

## 2022-01-26 NOTE — LETTER
1/26/2022        RE: William Ba  [address] [address] [address]        [place] [facility]  [place] Medical Record Number:  [id number]  [facility] of Service where encounter took place[facility] ([facility]) [[id number]]  Chief Complaint   Patient presents with     Cough       HPI:    William Ba  is a 86 year old (1935), who is being seen today for an episodic care visit.  HPI information obtained from: facility chart records, facility staff, patient report and [facility] chart review. Today's concern is: CAP, skin tears, dementia.  Had some decreased O2 sats earlier this week. S/p fall 1/21/22. CXR done showed CAP. Started on zpak.  Alb. MDIs started. Cont. On Breo for copd.  O2 sats improved to mid 90s over past 2 days. Has skin tears of R bicep, elbow s/p fall.  Both areas currently scabbed. Had some increase in confusion earlier this week. Improved today s/p tx of CAP.  Cot. On aricept for dementia.       Past Medical and Surgical History reviewed in Epic today.    MEDICATIONS:    Current Outpatient Medications   Medication Sig Dispense Refill     albuterol (PROAIR HFA/PROVENTIL HFA/VENTOLIN HFA) 108 (90 Base) MCG/ACT inhaler Inhale 2 puffs into the lungs every 4 hours as needed for shortness of breath / dyspnea or wheezing       aspirin 81 MG EC tablet Take 81 mg by mouth daily        atorvastatin (LIPITOR) 40 MG tablet Take 1 tablet (40 mg) by mouth At Bedtime 90 tablet 3     azithromycin (ZITHROMAX) 250 MG tablet Take 2 tablets (500 mg) by mouth daily for 1 day, THEN 1 tablet (250 mg) daily for 4 days. 6 tablet 0     donepezil (ARICEPT) 5 MG tablet Take 5 mg by mouth At Bedtime       EPINEPHrine (ANY BX GENERIC EQUIV) 0.3 MG/0.3ML injection 2-pack Inject 0.3 mLs (0.3 mg) into the muscle as needed for anaphylaxis 1 each 3     famotidine (PEPCID) 10 MG tablet Take 10 mg by mouth every evening       fluticasone-vilanterol (BREO ELLIPTA) 100-25 MCG/INH inhaler Inhale 1 puff  into the lungs daily 1 Inhaler 11     metoprolol succinate ER (TOPROL-XL) 50 MG 24 hr tablet Take 1 tablet (50 mg) by mouth daily 90 tablet 3     Misc Natural Products (OSTEO BI-FLEX ADV TRIPLE ST) TABS Take 1 capsule by mouth 2 times daily        Multiple Vitamins-Minerals (MULTIVITAMIN ADULT PO) Take one(1) tablet daily.       rivaroxaban ANTICOAGULANT (XARELTO) 20 MG TABS tablet Take 1 tablet (20 mg) by mouth daily (with dinner) 90 tablet 3     Saw Palmetto 160 MG CAPS Take 1 capsule by mouth daily            REVIEW OF SYSTEMS:  Unobtainable secondary to cognitive impairment. Reports feeling fine today    Objective:  /77   Pulse 67   Temp 97.7  F (36.5  C)   Resp 16   SpO2 95%   Exam:  GENERAL APPEARANCE:  Alert, in no distress, cooperative  ENT:  Mouth and posterior oropharynx normal, moist mucous membranes, [place]  EYES:  EOM, conjunctivae, lids, pupils and irises normal, PERRL  RESP:  respiratory effort and palpation of chest normal, lungs clear to auscultation , no respiratory distress  CV:  Palpation and auscultation of heart done , regular rate and rhythm, no murmur, rub, or gallop, no edema  ABDOMEN:  normal bowel sounds, soft, nontender, no hepatosplenomegaly or other masses, no guarding or rebound  M/S:   Gait and station normal  muscle strength 5/5 all 4 ext, normal tone  NEURO:   Cranial nerves 2-12 are normal tested and grossly at patient's baseline, no tremor  PSYCH:  insight and judgement impaired, memory impaired , affect and mood normal    Labs:   Recent labs in [facility] reviewed by me today.     ASSESSMENT/PLAN:  (J18.9) Community acquired pneumonia of left lower lobe of lung  (primary encounter diagnosis)  Comment: resolving. Afebrile. O2 sats stable  Plan: 1. Complete zpak  2. Cont. Bid alb nebs for now  3. Cont. Breo  4. Follow temps, O2 sats    (S51.011S) Skin tear of right elbow without complication, sequela  Comment: s/p fall 1/21/22  Plan: 1. Discontinue drsgs to both sites-currently  scabbed  2. Monitor for drainage, redness  3. Reassess over next week    (F03.90) Dementia without behavioral disturbance, unspecified dementia type (H)  Comment: memory loss. Some recent increase in confusion, likely to #1, currently stable  Plan: 1. Cont. aricept  2. Monitor for further increased confusion  3. Follow po intake, wt.s    Electronically signed by:  EYAD Diaz CNP                 Sincerely,        EYAD Diaz CNP

## 2022-01-30 ENCOUNTER — HEALTH MAINTENANCE LETTER (OUTPATIENT)
Age: 87
End: 2022-01-30

## 2022-01-31 ENCOUNTER — LAB REQUISITION (OUTPATIENT)
Dept: LAB | Facility: CLINIC | Age: 87
End: 2022-01-31
Payer: MEDICARE

## 2022-01-31 DIAGNOSIS — I10 ESSENTIAL (PRIMARY) HYPERTENSION: ICD-10-CM

## 2022-02-01 LAB
ANION GAP SERPL CALCULATED.3IONS-SCNC: 7 MMOL/L (ref 5–18)
BUN SERPL-MCNC: 11 MG/DL (ref 8–28)
CALCIUM SERPL-MCNC: 8 MG/DL (ref 8.5–10.5)
CHLORIDE BLD-SCNC: 109 MMOL/L (ref 98–107)
CO2 SERPL-SCNC: 24 MMOL/L (ref 22–31)
CREAT SERPL-MCNC: 0.74 MG/DL (ref 0.7–1.3)
ERYTHROCYTE [DISTWIDTH] IN BLOOD BY AUTOMATED COUNT: 13.2 % (ref 10–15)
GFR SERPL CREATININE-BSD FRML MDRD: 89 ML/MIN/1.73M2
GLUCOSE BLD-MCNC: 63 MG/DL (ref 70–125)
HCT VFR BLD AUTO: 35.9 % (ref 40–53)
HGB BLD-MCNC: 11.9 G/DL (ref 13.3–17.7)
MCH RBC QN AUTO: 31.5 PG (ref 26.5–33)
MCHC RBC AUTO-ENTMCNC: 33.1 G/DL (ref 31.5–36.5)
MCV RBC AUTO: 95 FL (ref 78–100)
PLATELET # BLD AUTO: 193 10E3/UL (ref 150–450)
POTASSIUM BLD-SCNC: 3.4 MMOL/L (ref 3.5–5)
RBC # BLD AUTO: 3.78 10E6/UL (ref 4.4–5.9)
SODIUM SERPL-SCNC: 140 MMOL/L (ref 136–145)
WBC # BLD AUTO: 6.5 10E3/UL (ref 4–11)

## 2022-02-01 PROCEDURE — P9603 ONE-WAY ALLOW PRORATED MILES: HCPCS | Mod: ORL | Performed by: NURSE PRACTITIONER

## 2022-02-01 PROCEDURE — 80048 BASIC METABOLIC PNL TOTAL CA: CPT | Mod: ORL | Performed by: NURSE PRACTITIONER

## 2022-02-01 PROCEDURE — 36415 COLL VENOUS BLD VENIPUNCTURE: CPT | Mod: ORL | Performed by: NURSE PRACTITIONER

## 2022-02-01 PROCEDURE — 85027 COMPLETE CBC AUTOMATED: CPT | Mod: ORL | Performed by: NURSE PRACTITIONER

## 2022-02-17 PROBLEM — I48.91 UNSPECIFIED ATRIAL FIBRILLATION (H): Status: ACTIVE | Noted: 2019-11-13

## 2022-04-27 ENCOUNTER — ASSISTED LIVING VISIT (OUTPATIENT)
Dept: GERIATRICS | Facility: CLINIC | Age: 87
End: 2022-04-27
Payer: MEDICARE

## 2022-04-27 VITALS
OXYGEN SATURATION: 96 % | DIASTOLIC BLOOD PRESSURE: 78 MMHG | RESPIRATION RATE: 16 BRPM | HEART RATE: 72 BPM | SYSTOLIC BLOOD PRESSURE: 141 MMHG

## 2022-04-27 DIAGNOSIS — J44.9 CHRONIC OBSTRUCTIVE PULMONARY DISEASE, UNSPECIFIED COPD TYPE (H): Primary | ICD-10-CM

## 2022-04-27 DIAGNOSIS — R19.7 DIARRHEA, UNSPECIFIED TYPE: ICD-10-CM

## 2022-04-27 DIAGNOSIS — I48.91 ATRIAL FIBRILLATION, UNSPECIFIED TYPE (H): ICD-10-CM

## 2022-04-27 NOTE — LETTER
4/27/2022        RE: William Ba  [address] [address] [address] [address]        [place] [facility]  [place] [facility] Record Number:  [id number]  Place of Service where encounter took place:  [facility] ([facility]) [233]  Chief Complaint   Patient presents with     COPD       HPI:    William Ba  is a 86 year old (1935), who is being seen today for an episodic care visit.  HPI information obtained from: facility chart records, facility staff, patient report and [facility] chart review. Today's concern is: copd, a-fib, diarrhea. For copd cont. On Breo, prn alb. MDI. No recent wheezing. Or cough.  O2 sats stable. HR stable. Cont. On xarelto, asa, toprol xl for a-fib.  No recent reports of dizziness. Has h/o diarrhea. S/s gen. Stable since decrease in aricept dose.  Po intake stable. Cont. On pepcid.      Past Medical and Surgical History reviewed in Epic today.    MEDICATIONS:    Current Outpatient Medications   Medication Sig Dispense Refill     albuterol (PROAIR HFA/PROVENTIL HFA/VENTOLIN HFA) 108 (90 Base) MCG/ACT inhaler Inhale 2 puffs into the lungs every 4 hours as needed for shortness of breath / dyspnea or wheezing       aspirin 81 MG EC tablet Take 81 mg by mouth daily        atorvastatin (LIPITOR) 40 MG tablet Take 1 tablet (40 mg) by mouth At Bedtime 90 tablet 3     donepezil (ARICEPT) 5 MG tablet Take 5 mg by mouth At Bedtime       EPINEPHrine (ANY BX GENERIC EQUIV) 0.3 MG/0.3ML injection 2-pack Inject 0.3 mLs (0.3 mg) into the muscle as needed for anaphylaxis 1 each 3     famotidine (PEPCID) 10 MG tablet Take 10 mg by mouth every evening       fluticasone-vilanterol (BREO ELLIPTA) 100-25 MCG/INH inhaler Inhale 1 puff into the lungs daily 1 Inhaler 11     metoprolol succinate ER (TOPROL-XL) 50 MG 24 hr tablet Take 1 tablet (50 mg) by mouth daily 90 tablet 3     Misc Natural Products (OSTEO BI-FLEX ADV TRIPLE ST) TABS Take 1 capsule by mouth 2 times daily         Multiple Vitamins-Minerals (MULTIVITAMIN ADULT PO) Take one(1) tablet daily.       rivaroxaban ANTICOAGULANT (XARELTO) 20 MG TABS tablet Take 1 tablet (20 mg) by mouth daily (with dinner) 90 tablet 3         REVIEW OF SYSTEMS:  No chest pain, shortness of breath, fevers, chills, headache, nausea, vomiting, dysuria or bowel abnormalities.  Appetite is  normal.  No pain except occ low back.    Objective:  BP (!) 141/78   Pulse 72   Resp 16   SpO2 96%   Exam:  GENERAL APPEARANCE:  Alert, in no distress  ENT:  Mouth and posterior oropharynx normal, moist mucous membranes, [place]  EYES:  EOM, conjunctivae, lids, pupils and irises normal, PERRL  RESP:  respiratory effort and palpation of chest normal, lungs clear to auscultation , no respiratory distress, diminished breath sounds bibasilar  CV:  Palpation and auscultation of heart done , regular rate and rhythm, no murmur, rub, or gallop, no edema  ABDOMEN:  normal bowel sounds, soft, nontender, no hepatosplenomegaly or other masses, no guarding or rebound  M/S:   Gait and station normal  muscle strength 5/5 all 4 ext.  NEURO:   Cranial nerves 2-12 are normal tested and grossly at patient's baseline, speech clear  PSYCH:  insight and judgement impaired, memory impaired , affect and mood normal    Labs:     Most Recent 3 CBC's:Recent Labs   Lab Test 02/01/22  1420 06/14/21  1005 09/23/20  0000 08/28/20  1649   WBC 6.5 9.3  --  8.7   HGB 11.9* 13.9 13.5 12.5*   MCV 95 99  --  101*    145*  --  127*     Most Recent 3 BMP's:Recent Labs   Lab Test 02/01/22  1420 06/14/21  1005 09/23/20  0000 08/28/20  1649    141 142.0 141   POTASSIUM 3.4* 4.1 4.72 4.3   CHLORIDE 109* 111* 107.7 110*   CO2 24 27 29.6 27   BUN 11 18 16  17.6 20   CR 0.74 0.97 0.91 0.87   ANIONGAP 7 3  --  4   NIEVES 8.0* 8.9 9.3 8.7   GLC 63* 90 94 87       ASSESSMENT/PLAN:  (J44.9) Chronic obstructive pulmonary disease, unspecified COPD type (H)  (primary encounter diagnosis)  Comment: resp. Status  stable.  Plan: 1. Cont. Breo, prn alb MDI  2. Follow O2 sats, temps  3. Monitor for cough, wheezing  4. Encourage increased amb. As yola    (I48.91) Atrial fibrillation, unspecified type (H)  Comment: rate stable. No recent dizziness. H/o anemia  Plan: 1. Cont. Xarelto, asa  2. Cont. toprol xl  3. Follow BPs, HRs  4. Monitor for dizziness  5. Cbc, bmp tomorrow    (R19.7) Diarrhea, unspecified type  Comment: no recent increase in s/s  Plan: 1. Encourage fluids  2. For increase in s/s, may start prn imodium  3. For sig. S/s, may consider discontinue aricept    Electronically signed by:  EYAD Diaz CNP                 Sincerely,        EYAD Diaz CNP

## 2022-04-27 NOTE — PROGRESS NOTES
[place] [facility]  [place] [facility] Record Number:  [id number]  [facility]  Chief Complaint   Patient presents with     COPD       HPI:    William Ba  is a 86 year old (1935), who is being seen today for an episodic care visit.  HPI information obtained from: facility chart records, facility staff, patient report and [facility] chart review. Today's concern is: copd, a-fib, diarrhea. For copd cont. On Breo, prn alb. MDI. No recent wheezing. Or cough.  O2 sats stable. HR stable. Cont. On xarelto, asa, toprol xl for a-fib.  No recent reports of dizziness. Has h/o diarrhea. S/s gen. Stable since decrease in aricept dose.  Po intake stable. Cont. On pepcid.      Past Medical and Surgical History reviewed in Epic today.    MEDICATIONS:    Current Outpatient Medications   Medication Sig Dispense Refill     albuterol (PROAIR HFA/PROVENTIL HFA/VENTOLIN HFA) 108 (90 Base) MCG/ACT inhaler Inhale 2 puffs into the lungs every 4 hours as needed for shortness of breath / dyspnea or wheezing       aspirin 81 MG EC tablet Take 81 mg by mouth daily        atorvastatin (LIPITOR) 40 MG tablet Take 1 tablet (40 mg) by mouth At Bedtime 90 tablet 3     donepezil (ARICEPT) 5 MG tablet Take 5 mg by mouth At Bedtime       EPINEPHrine (ANY BX GENERIC EQUIV) 0.3 MG/0.3ML injection 2-pack Inject 0.3 mLs (0.3 mg) into the muscle as needed for anaphylaxis 1 each 3     famotidine (PEPCID) 10 MG tablet Take 10 mg by mouth every evening       fluticasone-vilanterol (BREO ELLIPTA) 100-25 MCG/INH inhaler Inhale 1 puff into the lungs daily 1 Inhaler 11     metoprolol succinate ER (TOPROL-XL) 50 MG 24 hr tablet Take 1 tablet (50 mg) by mouth daily 90 tablet 3     Misc Natural Products (OSTEO BI-FLEX ADV TRIPLE ST) TABS Take 1 capsule by mouth 2 times daily        Multiple Vitamins-Minerals (MULTIVITAMIN ADULT PO) Take one(1) tablet daily.       rivaroxaban  ANTICOAGULANT (XARELTO) 20 MG TABS tablet Take 1 tablet (20 mg) by mouth daily (with dinner) 90 tablet 3         REVIEW OF SYSTEMS:  No chest pain, shortness of breath, fevers, chills, headache, nausea, vomiting, dysuria or bowel abnormalities.  Appetite is  normal.  No pain except occ low back.    Objective:  BP (!) 141/78   Pulse 72   Resp 16   SpO2 96%   Exam:  GENERAL APPEARANCE:  Alert, in no distress  ENT:  Mouth and posterior oropharynx normal, moist mucous membranes, [place]  EYES:  EOM, conjunctivae, lids, pupils and irises normal, PERRL  RESP:  respiratory effort and palpation of chest normal, lungs clear to auscultation , no respiratory distress, diminished breath sounds bibasilar  CV:  Palpation and auscultation of heart done , regular rate and rhythm, no murmur, rub, or gallop, no edema  ABDOMEN:  normal bowel sounds, soft, nontender, no hepatosplenomegaly or other masses, no guarding or rebound  M/S:   Gait and station normal  muscle strength 5/5 all 4 ext.  NEURO:   Cranial nerves 2-12 are normal tested and grossly at patient's baseline, speech clear  PSYCH:  insight and judgement impaired, memory impaired , affect and mood normal    Labs:     Most Recent 3 CBC's:Recent Labs   Lab Test 02/01/22  1420 06/14/21  1005 09/23/20  0000 08/28/20  1649   WBC 6.5 9.3  --  8.7   HGB 11.9* 13.9 13.5 12.5*   MCV 95 99  --  101*    145*  --  127*     Most Recent 3 BMP's:Recent Labs   Lab Test 02/01/22  1420 06/14/21  1005 09/23/20  0000 08/28/20  1649    141 142.0 141   POTASSIUM 3.4* 4.1 4.72 4.3   CHLORIDE 109* 111* 107.7 110*   CO2 24 27 29.6 27   BUN 11 18 16  17.6 20   CR 0.74 0.97 0.91 0.87   ANIONGAP 7 3  --  4   NIEVES 8.0* 8.9 9.3 8.7   GLC 63* 90 94 87       ASSESSMENT/PLAN:  (J44.9) Chronic obstructive pulmonary disease, unspecified COPD type (H)  (primary encounter diagnosis)  Comment: resp. Status stable.  Plan: 1. Cont. Breo, prn alb MDI  2. Follow O2 sats, temps  3. Monitor for cough,  wheezing  4. Encourage increased amb. As yola    (I48.91) Atrial fibrillation, unspecified type (H)  Comment: rate stable. No recent dizziness. H/o anemia  Plan: 1. Cont. Xarelto, asa  2. Cont. toprol xl  3. Follow BPs, HRs  4. Monitor for dizziness  5. Cbc, bmp tomorrow    (R19.7) Diarrhea, unspecified type  Comment: no recent increase in s/s  Plan: 1. Encourage fluids  2. For increase in s/s, may start prn imodium  3. For sig. S/s, may consider discontinue aricept    Electronically signed by:  EYAD Diaz CNP

## 2022-04-28 ENCOUNTER — LAB REQUISITION (OUTPATIENT)
Dept: LAB | Facility: CLINIC | Age: 87
End: 2022-04-28
Payer: MEDICARE

## 2022-04-28 DIAGNOSIS — I10 ESSENTIAL (PRIMARY) HYPERTENSION: ICD-10-CM

## 2022-04-29 LAB
ANION GAP SERPL CALCULATED.3IONS-SCNC: 12 MMOL/L (ref 5–18)
BUN SERPL-MCNC: 19 MG/DL (ref 8–28)
CALCIUM SERPL-MCNC: 9.1 MG/DL (ref 8.5–10.5)
CHLORIDE BLD-SCNC: 108 MMOL/L (ref 98–107)
CO2 SERPL-SCNC: 24 MMOL/L (ref 22–31)
CREAT SERPL-MCNC: 0.78 MG/DL (ref 0.7–1.3)
ERYTHROCYTE [DISTWIDTH] IN BLOOD BY AUTOMATED COUNT: 14.6 % (ref 10–15)
GFR SERPL CREATININE-BSD FRML MDRD: 87 ML/MIN/1.73M2
GLUCOSE BLD-MCNC: 105 MG/DL (ref 70–125)
HCT VFR BLD AUTO: 38.3 % (ref 40–53)
HGB BLD-MCNC: 12.2 G/DL (ref 13.3–17.7)
MCH RBC QN AUTO: 33.2 PG (ref 26.5–33)
MCHC RBC AUTO-ENTMCNC: 31.9 G/DL (ref 31.5–36.5)
MCV RBC AUTO: 104 FL (ref 78–100)
PLATELET # BLD AUTO: 159 10E3/UL (ref 150–450)
POTASSIUM BLD-SCNC: 3.7 MMOL/L (ref 3.5–5)
RBC # BLD AUTO: 3.68 10E6/UL (ref 4.4–5.9)
SODIUM SERPL-SCNC: 144 MMOL/L (ref 136–145)
WBC # BLD AUTO: 6.5 10E3/UL (ref 4–11)

## 2022-04-29 PROCEDURE — 85027 COMPLETE CBC AUTOMATED: CPT | Mod: ORL | Performed by: NURSE PRACTITIONER

## 2022-04-29 PROCEDURE — 36415 COLL VENOUS BLD VENIPUNCTURE: CPT | Mod: ORL | Performed by: NURSE PRACTITIONER

## 2022-04-29 PROCEDURE — 80048 BASIC METABOLIC PNL TOTAL CA: CPT | Mod: ORL | Performed by: NURSE PRACTITIONER

## 2022-04-29 PROCEDURE — P9603 ONE-WAY ALLOW PRORATED MILES: HCPCS | Mod: ORL | Performed by: NURSE PRACTITIONER

## 2022-06-03 ENCOUNTER — ASSISTED LIVING VISIT (OUTPATIENT)
Dept: GERIATRICS | Facility: CLINIC | Age: 87
End: 2022-06-03
Payer: MEDICARE

## 2022-06-03 VITALS
TEMPERATURE: 98 F | RESPIRATION RATE: 18 BRPM | HEART RATE: 65 BPM | HEIGHT: 71 IN | BODY MASS INDEX: 22.92 KG/M2 | SYSTOLIC BLOOD PRESSURE: 124 MMHG | OXYGEN SATURATION: 93 % | DIASTOLIC BLOOD PRESSURE: 76 MMHG
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
## 2022-06-03 DIAGNOSIS — J44.9 CHRONIC OBSTRUCTIVE PULMONARY DISEASE, UNSPECIFIED COPD TYPE (H): ICD-10-CM

## 2022-06-03 DIAGNOSIS — F03.90 DEMENTIA WITHOUT BEHAVIORAL DISTURBANCE, UNSPECIFIED DEMENTIA TYPE: ICD-10-CM

## 2022-06-03 NOTE — PROGRESS NOTES
[place] [facility]  [place] [facility] Record Number:  [id number]  [facility] [233]  Chief Complaint   Patient presents with     Fall       HPI:    William Ba  is a 86 year old (1935), who is being seen today for an episodic care visit.  HPI information obtained from: facility chart records, facility staff, patient report, [facility] chart review and family/first contact dtr report. Today's concern is: fall, COPD, dementia. S/p fall last week fell toward chair, caught himself, however hit L ribs on chair-has had pain at site. Yesterday may have fallen again, has abrasions to L forehead. Fall not witnessed.  Has had increased L rib pain since yesterday. Had some discomfort with deep inspiration.  O2 sats stable. No current wheezing or fever. For COPD cont. On breo, prn alb. MDI. For dementia. Cont. On aricept. Mood, behaviors stable.  Is anticoagulated with asa, xarelto for a-fib, CVA.       Past Medical and Surgical History reviewed in Epic today.    MEDICATIONS:    Current Outpatient Medications   Medication Sig Dispense Refill     diclofenac (VOLTAREN) 1 % topical gel Apply 2 g topically 2 times daily       albuterol (PROAIR HFA/PROVENTIL HFA/VENTOLIN HFA) 108 (90 Base) MCG/ACT inhaler Inhale 2 puffs into the lungs every 4 hours as needed for shortness of breath / dyspnea or wheezing       aspirin 81 MG EC tablet Take 81 mg by mouth daily        atorvastatin (LIPITOR) 40 MG tablet Take 1 tablet (40 mg) by mouth At Bedtime 90 tablet 3     donepezil (ARICEPT) 5 MG tablet Take 5 mg by mouth At Bedtime       EPINEPHrine (ANY BX GENERIC EQUIV) 0.3 MG/0.3ML injection 2-pack Inject 0.3 mLs (0.3 mg) into the muscle as needed for anaphylaxis 1 each 3     famotidine (PEPCID) 10 MG tablet Take 10 mg by mouth every evening       fluticasone-vilanterol (BREO ELLIPTA) 100-25 MCG/INH inhaler Inhale 1 puff into the lungs daily 1 Inhaler 11      "metoprolol succinate ER (TOPROL-XL) 50 MG 24 hr tablet Take 1 tablet (50 mg) by mouth daily 90 tablet 3     Misc Natural Products (OSTEO BI-FLEX ADV TRIPLE ST) TABS Take 1 capsule by mouth 2 times daily        Multiple Vitamins-Minerals (MULTIVITAMIN ADULT PO) Take one(1) tablet daily.       rivaroxaban ANTICOAGULANT (XARELTO) 20 MG TABS tablet Take 1 tablet (20 mg) by mouth daily (with dinner) 90 tablet 3         REVIEW OF SYSTEMS:  Unobtainable secondary to cognitive impairment. Reports L rib pain    Objective:  /76   Pulse 65   Temp 98  F (36.7  C)   Resp 18   Ht 1.791 m (5' 10.5\")   SpO2 93%   BMI 22.92 kg/m    Exam:  GENERAL APPEARANCE:  Alert, somnolent, cooperative  ENT:  Mouth and posterior oropharynx normal, moist mucous membranes, [place]  EYES:  EOM, conjunctivae, lids, pupils and irises normal, PERRL  NECK:  No adenopathy,masses or thyromegaly, no carotid bruit, FROM  RESP:  lungs clear to auscultation , diminished breath sounds bibasilar, some pain with deep inspiration, L ribs  CV:  Palpation and auscultation of heart done , regular rate and rhythm, no murmur, rub, or gallop, no edema  ABDOMEN:  normal bowel sounds, soft, nontender, no hepatosplenomegaly or other masses, no guarding or rebound  M/S:   gait steady, sl flexed at waist. muscle strength 5/5 all 4 ext, some pain with palp. L ribs  SKIN:  faint bruising to L ribs  NEURO:   Cranial nerves 2-12 are normal tested and grossly at patient's baseline, speech clear  PSYCH:  memory impaired , affect abnormal -flat    Labs:     Most Recent 3 CBC's:Recent Labs   Lab Test 04/29/22  0940 02/01/22  1420 06/14/21  1005   WBC 6.5 6.5 9.3   HGB 12.2* 11.9* 13.9   * 95 99    193 145*     Most Recent 3 BMP's:Recent Labs   Lab Test 04/29/22  0940 02/01/22  1420 06/14/21  1005    140 141   POTASSIUM 3.7 3.4* 4.1   CHLORIDE 108* 109* 111*   CO2 24 24 27   BUN 19 11 18   CR 0.78 0.74 0.97   ANIONGAP 12 7 3   NIEVES 9.1 8.0* 8.9    " 63* 90       ASSESSMENT/PLAN:  (W19.XXXA) Fall, initial encounter  (primary encounter diagnosis)  Comment: 2 recent fall. Increase in L rib pain. Neuros stable  Plan: start sched. Tylenol  2. voltaren gel to L ribs  3. Refer to PT  4. XR L ribs  5. Monitor for changes in Neuros    (J44.9) Chronic obstructive pulmonary disease, unspecified COPD type (H)  Comment: no wheezing. Some L rib discomfort with deep inspiration  Plan: 1. XR OF L ribs as above  2. Cont. Breo, prn alb . MDI  3. Follow temps, O2 sats    (F03.90) Dementia without behavioral disturbance, unspecified dementia type (H)  Comment: recent falls.  No recent reports of insomnia or changes in mood, behaviors  Plan: 1. Cont. Aricept, monitor for loose stools  2. Monitor for increased anxiety, insomnia    Electronically signed by:  EYAD Diaz CNP         Documentation of Face-to-Face and Certification for Home Health Services     Patient: William Ba   YOB: 1935  MR Number: [id number]  Today's Date: 6/3/2022    I certify that patient: William Ba is under my care and that I, or a nurse practitioner or physician's assistant working with me, had a face-to-face encounter that meets the physician face-to-face encounter requirements with this patient on: 6/3/2022.    This encounter with the patient was in whole, or in part, for the following medical condition, which is the primary reason for home health care: fall.    I certify that, based on my findings, the following services are medically necessary home health services: Physical Therapy.    My clinical findings support the need for the above services because: Physical Therapy Services are needed to assess and treat the following functional impairments: falls.    Further, I certify that my clinical findings support that this patient is homebound (i.e. absences from home require considerable and taxing effort and are for medical reasons or [place] services or infrequently  or of short duration when for other reasons) because: Requires assistance of another person or specialized equipment to access medical services because patient:  dementia..    Based on the above findings. I certify that this patient is confined to the home and needs intermittent skilled nursing care, physical therapy and/or speech therapy.  The patient is under my care, and I have initiated the establishment of the plan of care.  This patient will be followed by a physician who will periodically review the plan of care.  Physician/Provider to provide follow up care: Abraham Velez    Responsible Medicare certified PECOS Physician: Sarah Suresh  Physician Signature: See electronic signature associated with these discharge orders.  Date: 6/3/2022

## 2022-06-03 NOTE — LETTER
6/3/2022        RE: William Ba  [address] [address] [facility] [address]        [place] [facility]  [place] Medical Record Number:  [id number]  [facility] [233[facility]  Chief Complaint   Patient presents with     Fall       HPI:    William Ba  is a 86 year old (1935), who is being seen today for an episodic care visit.  HPI information obtained from: facility chart records, facility staff, patient report, [facility] chart review and family/first contact dtr report. Today's concern is: fall, COPD, dementia. S/p fall last week fell toward chair, caught himself, however hit L ribs on chair-has had pain at site. Yesterday may have fallen again, has abrasions to L forehead. Fall not witnessed.  Has had increased L rib pain since yesterday. Had some discomfort with deep inspiration.  O2 sats stable. No current wheezing or fever. For COPD cont. On breo, prn alb. MDI. For dementia. Cont. On aricept. Mood, behaviors stable.  Is anticoagulated with asa, xarelto for a-fib, CVA.       Past Medical and Surgical History reviewed in Epic today.    MEDICATIONS:    Current Outpatient Medications   Medication Sig Dispense Refill     diclofenac (VOLTAREN) 1 % topical gel Apply 2 g topically 2 times daily       albuterol (PROAIR HFA/PROVENTIL HFA/VENTOLIN HFA) 108 (90 Base) MCG/ACT inhaler Inhale 2 puffs into the lungs every 4 hours as needed for shortness of breath / dyspnea or wheezing       aspirin 81 MG EC tablet Take 81 mg by mouth daily        atorvastatin (LIPITOR) 40 MG tablet Take 1 tablet (40 mg) by mouth At Bedtime 90 tablet 3     donepezil (ARICEPT) 5 MG tablet Take 5 mg by mouth At Bedtime       EPINEPHrine (ANY BX GENERIC EQUIV) 0.3 MG/0.3ML injection 2-pack Inject 0.3 mLs (0.3 mg) into the muscle as needed for anaphylaxis 1 each 3     famotidine (PEPCID) 10 MG tablet Take 10 mg by mouth every evening       fluticasone-vilanterol  "(BREO ELLIPTA) 100-25 MCG/INH inhaler Inhale 1 puff into the lungs daily 1 Inhaler 11     metoprolol succinate ER (TOPROL-XL) 50 MG 24 hr tablet Take 1 tablet (50 mg) by mouth daily 90 tablet 3     Misc Natural Products (OSTEO BI-FLEX ADV TRIPLE ST) TABS Take 1 capsule by mouth 2 times daily        Multiple Vitamins-Minerals (MULTIVITAMIN ADULT PO) Take one(1) tablet daily.       rivaroxaban ANTICOAGULANT (XARELTO) 20 MG TABS tablet Take 1 tablet (20 mg) by mouth daily (with dinner) 90 tablet 3         REVIEW OF SYSTEMS:  Unobtainable secondary to cognitive impairment. Reports L rib pain    Objective:  /76   Pulse 65   Temp 98  F (36.7  C)   Resp 18   Ht 1.791 m (5' 10.5\")   SpO2 93%   BMI 22.92 kg/m    Exam:  GENERAL APPEARANCE:  Alert, somnolent, cooperative  ENT:  Mouth and posterior oropharynx normal, moist mucous membranes, [place]  EYES:  EOM, conjunctivae, lids, pupils and irises normal, PERRL  NECK:  No adenopathy,masses or thyromegaly, no carotid bruit, FROM  RESP:  lungs clear to auscultation , diminished breath sounds bibasilar, some pain with deep inspiration, L ribs  CV:  Palpation and auscultation of heart done , regular rate and rhythm, no murmur, rub, or gallop, no edema  ABDOMEN:  normal bowel sounds, soft, nontender, no hepatosplenomegaly or other masses, no guarding or rebound  M/S:   gait steady, sl flexed at waist. muscle strength 5/5 all 4 ext, some pain with palp. L ribs  SKIN:  faint bruising to L ribs  NEURO:   Cranial nerves 2-12 are normal tested and grossly at patient's baseline, speech clear  PSYCH:  memory impaired , affect abnormal -flat    Labs:     Most Recent 3 CBC's:Recent Labs   Lab Test 04/29/22  0940 02/01/22  1420 06/14/21  1005   WBC 6.5 6.5 9.3   HGB 12.2* 11.9* 13.9   * 95 99    193 145*     Most Recent 3 BMP's:Recent Labs   Lab Test 04/29/22  0940 02/01/22  1420 06/14/21  1005    140 141   POTASSIUM 3.7 3.4* 4.1   CHLORIDE 108* 109* 111*   CO2 24 " 24 27   BUN 19 11 18   CR 0.78 0.74 0.97   ANIONGAP 12 7 3   NIEVES 9.1 8.0* 8.9    63* 90       ASSESSMENT/PLAN:  (W19.XXXA) Fall, initial encounter  (primary encounter diagnosis)  Comment: 2 recent fall. Increase in L rib pain. Neuros stable  Plan: start sched. Tylenol  2. voltaren gel to L ribs  3. Refer to PT  4. XR L ribs  5. Monitor for changes in Neuros    (J44.9) Chronic obstructive pulmonary disease, unspecified COPD type (H)  Comment: no wheezing. Some L rib discomfort with deep inspiration  Plan: 1. XR OF L ribs as above  2. Cont. Breo, prn alb . MDI  3. Follow temps, O2 sats    (F03.90) Dementia without behavioral disturbance, unspecified dementia type (H)  Comment: recent falls.  No recent reports of insomnia or changes in mood, behaviors  Plan: 1. Cont. Aricept, monitor for loose stools  2. Monitor for increased anxiety, insomnia    Electronically signed by:  EYAD Diaz CNP         Documentation of Face-to-Face and Certification for Home Health Services     Patient: William Ba   YOB: 1935  MR Number: [id number]  Today's Date: 6/3/2022    I certify that patient: William Ba is under my care and that I, or a nurse practitioner or physician's assistant working with me, had a face-to-face encounter that meets the physician face-to-face encounter requirements with this patient on: 6/3/2022.    This encounter with the patient was in whole, or in part, for the following medical condition, which is the primary reason for home health care: fall.    I certify that, based on my findings, the following services are medically necessary home health services: Physical Therapy.    My clinical findings support the need for the above services because: Physical Therapy Services are needed to assess and treat the following functional impairments: falls.    Further, I certify that my clinical findings support that this patient is homebound (i.e. absences from home require  considerable and taxing effort and are for medical reasons or [employer] services or infrequently or of short duration when for other reasons) because: Requires assistance of another person or specialized equipment to access medical services because patient:  dementia..    Based on the above findings. I certify that this patient is confined to the home and needs intermittent skilled nursing care, physical therapy and/or speech therapy.  The patient is under my care, and I have initiated the establishment of the plan of care.  This patient will be followed by a physician who will periodically review the plan of care.  Physician/Provider to provide follow up care: Abraham Velez    Responsible Medicare certified PECOS Physician: Sarah Suresh  Physician Signature: See electronic signature associated with these discharge orders.  Date: 6/3/2022                Sincerely,        EYAD Diaz CNP

## 2022-08-03 NOTE — PROGRESS NOTES
[place] [facility]  [place] Medical Record Number:  [id number]  [facility] of [facility] encounter took [facility] [233]  Chief Complaint   Patient presents with     Cough       HPI:    William Ba  is a 87 year old (1935), who is being seen today for an episodic care visit.  HPI information obtained from: facility chart records, facility staff and [facility] chart review. Today's concern is: CAP, copd, a-fib. Has had productive cough, wheezing, sob at times.  Has copd.  Cont. On  Breo, prn alb. MDI. CXR done 8/2/22 showed bilat LL pneumonia.  Started on zpak. Ongoing cough today. Exp wheezing. Has prn mucinex-not recently used. For a-fib cont. On xarelto, metoprolol.  HRs stable.  No recent reports of dizziness.       Past Medical and Surgical History reviewed in Epic today.    MEDICATIONS:    Current Outpatient Medications   Medication Sig Dispense Refill     albuterol (PROAIR HFA/PROVENTIL HFA/VENTOLIN HFA) 108 (90 Base) MCG/ACT inhaler Inhale 2 puffs into the lungs every 4 hours as needed for shortness of breath / dyspnea or wheezing       aspirin 81 MG EC tablet Take 81 mg by mouth daily        atorvastatin (LIPITOR) 40 MG tablet Take 1 tablet (40 mg) by mouth At Bedtime 90 tablet 3     diclofenac (VOLTAREN) 1 % topical gel Apply 2 g topically 2 times daily       donepezil (ARICEPT) 5 MG tablet Take 5 mg by mouth At Bedtime       EPINEPHrine (ANY BX GENERIC EQUIV) 0.3 MG/0.3ML injection 2-pack Inject 0.3 mLs (0.3 mg) into the muscle as needed for anaphylaxis 1 each 3     famotidine (PEPCID) 10 MG tablet Take 10 mg by mouth every evening       fluticasone-vilanterol (BREO ELLIPTA) 100-25 MCG/INH inhaler Inhale 1 puff into the lungs daily 1 Inhaler 11     metoprolol succinate ER (TOPROL-XL) 50 MG 24 hr tablet Take 1 tablet (50 mg) by mouth daily 90 tablet 3     Misc Natural Products (OSTEO BI-FLEX ADV TRIPLE ST) TABS Take 1 capsule by mouth 2 times daily         "Multiple Vitamins-Minerals (MULTIVITAMIN ADULT PO) Take one(1) tablet daily.       rivaroxaban ANTICOAGULANT (XARELTO) 20 MG TABS tablet Take 1 tablet (20 mg) by mouth daily (with dinner) 90 tablet 3         REVIEW OF SYSTEMS:  Unobtainable secondary to cognitive impairment. Reports cough    Objective:  /68   Pulse 73   Temp 97.9  F (36.6  C)   Resp 20   Ht 1.791 m (5' 10.5\")   SpO2 90%   BMI 22.92 kg/m    Exam:  GENERAL APPEARANCE:  Alert, cooperative, sl anxious  ENT:  Mouth and posterior oropharynx normal, moist mucous membranes, [place], no rhinitis  EYES:  EOM, conjunctivae, lids, pupils and irises normal, PERRL  RESP:  diminished breath sounds bibasilar. exp wheezes throughout. cough present, no accessory muscle use  CV:  Palpation and auscultation of heart done , regular rate and rhythm, no murmur, rub, or gallop, no edema  ABDOMEN:  normal bowel sounds, soft, nontender, no hepatosplenomegaly or other masses, no guarding or rebound  M/S:   Gait and station normal  muscle strength 5/5 all 4 ext.  NEURO:   Cranial nerves 2-12 are normal tested and grossly at patient's baseline, no tremor  PSYCH:  insight and judgement impaired, memory impaired , affect abnormal -flat    Labs:     Most Recent 3 CBC's:Recent Labs   Lab Test 04/29/22  0940 02/01/22  1420 06/14/21  1005   WBC 6.5 6.5 9.3   HGB 12.2* 11.9* 13.9   * 95 99    193 145*     Most Recent 3 BMP's:Recent Labs   Lab Test 04/29/22  0940 02/01/22  1420 06/14/21  1005    140 141   POTASSIUM 3.7 3.4* 4.1   CHLORIDE 108* 109* 111*   CO2 24 24 27   BUN 19 11 18   CR 0.78 0.74 0.97   ANIONGAP 12 7 3   NIEVES 9.1 8.0* 8.9    63* 90       ASSESSMENT/PLAN:  (J18.9) Pneumonia of both lower lobes due to infectious organism  (primary encounter diagnosis)  Comment: cough. No fever today. Recent neg. covid swab  Plan: 1. Complete zpak  2. Monitor for fever, follow O2 sats  3. Reassess over next week, if s/s not improved, may repeat CXR  4. " Increased act. Level as yola    (J44.9) Chronic obstructive pulmonary disease, unspecified COPD type (H)  Comment: current wheezing, likely r/t #1  Plan: 1. Cont. Breo MDI  2. Start alb nebs tid x 7 days  3. Sched. mucinex bid  4. For ongoing wheezing, sob consider prednisone burst    (I48.91) Atrial fibrillation, unspecified type (H)  Comment: rate stable. BP stable  Plan: 1. Cont. xarelto  2. Cont. toprol XL  3. Follow BPs, HRs, monitor for dizziness. Encourage fluids  4. Hgb, bmp in next 1-3 mos    Electronically signed by:  EYAD Diaz CNP

## 2022-08-03 NOTE — LETTER
8/3/2022        RE: William Ba  [address] [address] [facility] [address]        [place] [facility]  [place] Medical Record Number:  [id number]  [facility] of Service where encounter took place[facility] ([facility]) [233]  Chief Complaint   Patient presents with     Cough       HPI:    William Ba  is a 87 year old (1935), who is being seen today for an episodic care visit.  HPI information obtained from: facility chart records, facility staff and [facility] chart review. Today's concern is: CAP, copd, a-fib. Has had productive cough, wheezing, sob at times.  Has copd.  Cont. On  Breo, prn alb. MDI. CXR done 8/2/22 showed bilat LL pneumonia.  Started on zpak. Ongoing cough today. Exp wheezing. Has prn mucinex-not recently used. For a-fib cont. On xarelto, metoprolol.  HRs stable.  No recent reports of dizziness.       Past Medical and Surgical History reviewed in Epic today.    MEDICATIONS:    Current Outpatient Medications   Medication Sig Dispense Refill     albuterol (PROAIR HFA/PROVENTIL HFA/VENTOLIN HFA) 108 (90 Base) MCG/ACT inhaler Inhale 2 puffs into the lungs every 4 hours as needed for shortness of breath / dyspnea or wheezing       aspirin 81 MG EC tablet Take 81 mg by mouth daily        atorvastatin (LIPITOR) 40 MG tablet Take 1 tablet (40 mg) by mouth At Bedtime 90 tablet 3     diclofenac (VOLTAREN) 1 % topical gel Apply 2 g topically 2 times daily       donepezil (ARICEPT) 5 MG tablet Take 5 mg by mouth At Bedtime       EPINEPHrine (ANY BX GENERIC EQUIV) 0.3 MG/0.3ML injection 2-pack Inject 0.3 mLs (0.3 mg) into the muscle as needed for anaphylaxis 1 each 3     famotidine (PEPCID) 10 MG tablet Take 10 mg by mouth every evening       fluticasone-vilanterol (BREO ELLIPTA) 100-25 MCG/INH inhaler Inhale 1 puff into the lungs daily 1 Inhaler 11     metoprolol succinate ER (TOPROL-XL) 50 MG 24 hr tablet Take 1 tablet (50 mg) by mouth daily 90 tablet 3     Misc Natural  "Products (OSTEO BI-FLEX ADV TRIPLE ST) TABS Take 1 capsule by mouth 2 times daily        Multiple Vitamins-Minerals (MULTIVITAMIN ADULT PO) Take one(1) tablet daily.       rivaroxaban ANTICOAGULANT (XARELTO) 20 MG TABS tablet Take 1 tablet (20 mg) by mouth daily (with dinner) 90 tablet 3         REVIEW OF SYSTEMS:  Unobtainable secondary to cognitive impairment. Reports cough    Objective:  /68   Pulse 73   Temp 97.9  F (36.6  C)   Resp 20   Ht 1.791 m (5' 10.5\")   SpO2 90%   BMI 22.92 kg/m    Exam:  GENERAL APPEARANCE:  Alert, cooperative, sl anxious  ENT:  Mouth and posterior oropharynx normal, moist mucous membranes, [place], no rhinitis  EYES:  EOM, conjunctivae, lids, pupils and irises normal, PERRL  RESP:  diminished breath sounds bibasilar. exp wheezes throughout. cough present, no accessory muscle use  CV:  Palpation and auscultation of heart done , regular rate and rhythm, no murmur, rub, or gallop, no edema  ABDOMEN:  normal bowel sounds, soft, nontender, no hepatosplenomegaly or other masses, no guarding or rebound  M/S:   Gait and station normal  muscle strength 5/5 all 4 ext.  NEURO:   Cranial nerves 2-12 are normal tested and grossly at patient's baseline, no tremor  PSYCH:  insight and judgement impaired, memory impaired , affect abnormal -flat    Labs:     Most Recent 3 CBC's:Recent Labs   Lab Test 04/29/22  0940 02/01/22  1420 06/14/21  1005   WBC 6.5 6.5 9.3   HGB 12.2* 11.9* 13.9   * 95 99    193 145*     Most Recent 3 BMP's:Recent Labs   Lab Test 04/29/22  0940 02/01/22  1420 06/14/21  1005    140 141   POTASSIUM 3.7 3.4* 4.1   CHLORIDE 108* 109* 111*   CO2 24 24 27   BUN 19 11 18   CR 0.78 0.74 0.97   ANIONGAP 12 7 3   NIEVES 9.1 8.0* 8.9    63* 90       ASSESSMENT/PLAN:  (J18.9) Pneumonia of both lower lobes due to infectious organism  (primary encounter diagnosis)  Comment: cough. No fever today. Recent neg. covid swab  Plan: 1. Complete zpak  2. Monitor for " fever, follow O2 sats  3. Reassess over next week, if s/s not improved, may repeat CXR  4. Increased act. Level as yola    (J44.9) Chronic obstructive pulmonary disease, unspecified COPD type (H)  Comment: current wheezing, likely r/t #1  Plan: 1. Cont. Breo MDI  2. Start alb nebs tid x 7 days  3. Sched. mucinex bid  4. For ongoing wheezing, sob consider prednisone burst    (I48.91) Atrial fibrillation, unspecified type (H)  Comment: rate stable. BP stable  Plan: 1. Cont. xarelto  2. Cont. toprol XL  3. Follow BPs, HRs, monitor for dizziness. Encourage fluids  4. Hgb, bmp in next 1-3 mos    Electronically signed by:  EYAD Diaz CNP                 Sincerely,        EYAD Diaz CNP

## 2022-08-10 NOTE — PROGRESS NOTES
[place] [facility]  [place] Medical Record Number:  [id number]  [facility] [233[facility]  Chief Complaint   Patient presents with     Cough       HPI:    William Ba  is a 87 year old (1935), who is being seen today for an episodic care visit.  HPI information obtained from: facility chart records, facility staff, patient report and [facility] chart review. Today's concern is: CAP, COPD, dementia. Has had cough, low grade temp. CXR done showed early pneumonia.  Started on zpak, mucinex. For COPD cont on Breo.  Started on alb MDI for wheezing.  O2 sats stable. Afebrile. Some deconditioning.  Ongoing prod. Cough, wheezing. Has memory loss due to dementia. Cont. On aricept. No recent increased insomnia.      Past Medical and Surgical History reviewed in Epic today.    MEDICATIONS:    Current Outpatient Medications   Medication Sig Dispense Refill     albuterol (PROAIR HFA/PROVENTIL HFA/VENTOLIN HFA) 108 (90 Base) MCG/ACT inhaler Inhale 2 puffs into the lungs every 4 hours as needed for shortness of breath / dyspnea or wheezing       aspirin 81 MG EC tablet Take 81 mg by mouth daily        atorvastatin (LIPITOR) 40 MG tablet Take 1 tablet (40 mg) by mouth At Bedtime 90 tablet 3     diclofenac (VOLTAREN) 1 % topical gel Apply 2 g topically 2 times daily       donepezil (ARICEPT) 5 MG tablet Take 5 mg by mouth At Bedtime       EPINEPHrine (ANY BX GENERIC EQUIV) 0.3 MG/0.3ML injection 2-pack Inject 0.3 mLs (0.3 mg) into the muscle as needed for anaphylaxis 1 each 3     famotidine (PEPCID) 10 MG tablet Take 10 mg by mouth every evening       fluticasone-vilanterol (BREO ELLIPTA) 100-25 MCG/INH inhaler Inhale 1 puff into the lungs daily 1 Inhaler 11     metoprolol succinate ER (TOPROL-XL) 50 MG 24 hr tablet Take 1 tablet (50 mg) by mouth daily 90 tablet 3     Misc Natural Products (OSTEO BI-FLEX ADV TRIPLE ST) TABS Take 1 capsule by mouth 2  "times daily        Multiple Vitamins-Minerals (MULTIVITAMIN ADULT PO) Take one(1) tablet daily.       rivaroxaban ANTICOAGULANT (XARELTO) 20 MG TABS tablet Take 1 tablet (20 mg) by mouth daily (with dinner) 90 tablet 3         REVIEW OF SYSTEMS:  Unobtainable secondary to cognitive impairment. Reports ongoing cough    Objective:  /67   Pulse 58   Temp 98  F (36.7  C)   Resp 16   Ht 1.791 m (5' 10.5\")   SpO2 94%   BMI 22.92 kg/m    Exam:  GENERAL APPEARANCE:  in no distress, thin, cooperative, sleepy  ENT:  Mouth and posterior oropharynx normal, moist mucous membranes, [place], no rhinitis  EYES:  EOM, conjunctivae, lids, pupils and irises normal, PERRL  RESP:  exp. wheezes throughout, partially clear with cough.  CV:  Palpation and auscultation of heart done , regular rate and rhythm, no murmur, rub, or gallop, no edema  ABDOMEN:  normal bowel sounds, soft, nontender, no hepatosplenomegaly or other masses, no guarding or rebound  M/S:   Gait and station normal  muscle strength 5/5 all 4 ext.  NEURO:   Cranial nerves 2-12 are normal tested and grossly at patient's baseline, no tremor seen  PSYCH:  insight and judgement impaired, memory impaired , affect abnormal -flat    Labs:     Most Recent 3 CBC's:Recent Labs   Lab Test 04/29/22  0940 02/01/22  1420 06/14/21  1005   WBC 6.5 6.5 9.3   HGB 12.2* 11.9* 13.9   * 95 99    193 145*     Most Recent 3 BMP's:Recent Labs   Lab Test 04/29/22  0940 02/01/22  1420 06/14/21  1005    140 141   POTASSIUM 3.7 3.4* 4.1   CHLORIDE 108* 109* 111*   CO2 24 24 27   BUN 19 11 18   CR 0.78 0.74 0.97   ANIONGAP 12 7 3   NIEVES 9.1 8.0* 8.9    63* 90       ASSESSMENT/PLAN:  (J44.9) Chronic obstructive pulmonary disease, unspecified COPD type (H)  (primary encounter diagnosis)  Comment: ongoing wheezing  Plan: 1. Restart alb nebs tid x 7 days. Cont. Breo  2. Reassess over next week  3. For ongoing s/s consider prednisone burst    (J18.9) Community acquired " pneumonia of left lower lobe of lung  Comment: ongoing cough. Afebrile. O2 sats stable. Some deconditioning. Completed zpak  Plan: 1. Restart mucinex  2. Monitor for fever  3. For fever, ongoing cough, may repeat CXR    (F03.90) Dementia without behavioral disturbance, unspecified dementia type (H)  Comment: memory loss. Mood gen. Stable.  Plan: 1. Cont. aricept  2. Monitor for insomnia  3. Follow po intake, wt.s    Electronically signed by:  EYAD Diaz CNP

## 2022-08-10 NOTE — LETTER
8/10/2022        RE: William Ba  [address] [address] [facility] [address] [facility] [address]        [place] [facility]  [place] Medical Record Number:  [id number]  [facility] of Service where encounter took place[facility] ([facility] [233]  Chief Complaint   Patient presents with     Cough       HPI:    William Ba  is a 87 year old (1935), who is being seen today for an episodic care visit.  HPI information obtained from: facility chart records, facility staff, patient report and [facility] chart review. Today's concern is: CAP, COPD, dementia. Has had cough, low grade temp. CXR done showed early pneumonia.  Started on zpak, mucinex. For COPD cont on Breo.  Started on alb MDI for wheezing.  O2 sats stable. Afebrile. Some deconditioning.  Ongoing prod. Cough, wheezing. Has memory loss due to dementia. Cont. On aricept. No recent increased insomnia.      Past Medical and Surgical History reviewed in Epic today.    MEDICATIONS:    Current Outpatient Medications   Medication Sig Dispense Refill     albuterol (PROAIR HFA/PROVENTIL HFA/VENTOLIN HFA) 108 (90 Base) MCG/ACT inhaler Inhale 2 puffs into the lungs every 4 hours as needed for shortness of breath / dyspnea or wheezing       aspirin 81 MG EC tablet Take 81 mg by mouth daily        atorvastatin (LIPITOR) 40 MG tablet Take 1 tablet (40 mg) by mouth At Bedtime 90 tablet 3     diclofenac (VOLTAREN) 1 % topical gel Apply 2 g topically 2 times daily       donepezil (ARICEPT) 5 MG tablet Take 5 mg by mouth At Bedtime       EPINEPHrine (ANY BX GENERIC EQUIV) 0.3 MG/0.3ML injection 2-pack Inject 0.3 mLs (0.3 mg) into the muscle as needed for anaphylaxis 1 each 3     famotidine (PEPCID) 10 MG tablet Take 10 mg by mouth every evening       fluticasone-vilanterol (BREO ELLIPTA) 100-25 MCG/INH inhaler Inhale 1 puff into the lungs daily 1 Inhaler 11     metoprolol succinate ER (TOPROL-XL) 50 MG 24 hr tablet Take 1 tablet (50 mg) by mouth daily 90 tablet 3  "    Misc Natural Products (OSTEO BI-FLEX ADV TRIPLE ST) TABS Take 1 capsule by mouth 2 times daily        Multiple Vitamins-Minerals (MULTIVITAMIN ADULT PO) Take one(1) tablet daily.       rivaroxaban ANTICOAGULANT (XARELTO) 20 MG TABS tablet Take 1 tablet (20 mg) by mouth daily (with dinner) 90 tablet 3         REVIEW OF SYSTEMS:  Unobtainable secondary to cognitive impairment. Reports ongoing cough    Objective:  /67   Pulse 58   Temp 98  F (36.7  C)   Resp 16   Ht 1.791 m (5' 10.5\")   SpO2 94%   BMI 22.92 kg/m    Exam:  GENERAL APPEARANCE:  in no distress, thin, cooperative, sleepy  ENT:  Mouth and posterior oropharynx normal, moist mucous membranes, [place], no rhinitis  EYES:  EOM, conjunctivae, lids, pupils and irises normal, PERRL  RESP:  exp. wheezes throughout, partially clear with cough.  CV:  Palpation and auscultation of heart done , regular rate and rhythm, no murmur, rub, or gallop, no edema  ABDOMEN:  normal bowel sounds, soft, nontender, no hepatosplenomegaly or other masses, no guarding or rebound  M/S:   Gait and station normal  muscle strength 5/5 all 4 ext.  NEURO:   Cranial nerves 2-12 are normal tested and grossly at patient's baseline, no tremor seen  PSYCH:  insight and judgement impaired, memory impaired , affect abnormal -flat    Labs:     Most Recent 3 CBC's:Recent Labs   Lab Test 04/29/22  0940 02/01/22  1420 06/14/21  1005   WBC 6.5 6.5 9.3   HGB 12.2* 11.9* 13.9   * 95 99    193 145*     Most Recent 3 BMP's:Recent Labs   Lab Test 04/29/22  0940 02/01/22  1420 06/14/21  1005    140 141   POTASSIUM 3.7 3.4* 4.1   CHLORIDE 108* 109* 111*   CO2 24 24 27   BUN 19 11 18   CR 0.78 0.74 0.97   ANIONGAP 12 7 3   NIEVES 9.1 8.0* 8.9    63* 90       ASSESSMENT/PLAN:  (J44.9) Chronic obstructive pulmonary disease, unspecified COPD type (H)  (primary encounter diagnosis)  Comment: ongoing wheezing  Plan: 1. Restart alb nebs tid x 7 days. Cont. Breo  2. Reassess over " next week  3. For ongoing s/s consider prednisone burst    (J18.9) Community acquired pneumonia of left lower lobe of lung  Comment: ongoing cough. Afebrile. O2 sats stable. Some deconditioning. Completed zpak  Plan: 1. Restart mucinex  2. Monitor for fever  3. For fever, ongoing cough, may repeat CXR    (F03.90) Dementia without behavioral disturbance, unspecified dementia type (H)  Comment: memory loss. Mood gen. Stable.  Plan: 1. Cont. aricept  2. Monitor for insomnia  3. Follow po intake, wt.s    Electronically signed by:  EYAD Diaz CNP                 Sincerely,        EYAD Diaz CNP

## 2022-08-28 NOTE — ED TRIAGE NOTES
A&O to self, ABCs intact. Pt presents with daughter for increased confusion over the past week and bloody urine for the past 3 days.        Triage Assessment     Row Name 08/28/22 1244       Triage Assessment (Adult)    Airway WDL WDL       Respiratory WDL    Respiratory WDL WDL       Cardiac WDL    Cardiac WDL WDL
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
## 2022-08-28 NOTE — ED NOTES
Patient's daughter took patient to wait in the car due to concerns for Covid exposures, another staff member told her it was okay. Daughter contacted to let them know we would like to start blood work while waiting for a room but they would not be able to go back outside with an IV in place. Daughter advised that they would wait for a room

## 2022-08-29 NOTE — DISCHARGE INSTRUCTIONS
Drink plenty of fluids.  Return to the ER if having worsening fevers, nausea, vomiting, unable to eat or drink normally or other concerns.  Follow-up with the geriatrics team to ensure that you are improving.  A culture nurse will call you if the antibiotics needs to be changed.    Discharge Instructions  Urinary Tract Infection  You or your child have been diagnosed with a urinary tract infection, or UTI. The urinary tract includes the kidneys (which make urine/pee), ureters (the tubes that carry urine/pee from the kidneys to the bladder), the bladder (which stores urine/pee), and urethra (the tube that carries urine/pee out of the bladder). Urinary tract infections occur when bacteria travel up the urethra into the bladder (bladder infection) and, in some cases, from there into the kidneys (kidney infection).  Generally, every Emergency Department visit should have a follow-up clinic visit with either a primary or a specialty clinic/provider. Please follow-up as instructed by your emergency provider today.  Return to the Emergency Department if:  You or your child have severe back pain.  You or your child are vomiting (throwing up) so that you cannot take your medicine.  You or your child have a new fever (had not previously had a fever) over 101 F.  You or your child have confusion or are very weak, or feel very ill.  Your child seems much more ill, will not wake up, will not respond right, or is crying for a long time and will not calm down.  You or your child are showing signs of dehydration. These signs may include decreased urination (pee), dry mouth/gums/tongue, or decreased activity.    Follow-up with your provider:   Children under 24 months need to be seen by their regular provider within one week after a diagnosis of a UTI. It may be necessary to do some more tests to look at the child s kidney or bladder.  You should begin to feel better within 24 - 48 hours of starting your antibiotic; follow-up with  your regular clinic/doctor/provider if this is not the case.    Treatment:   You will be treated with an antibiotic to kill the bacteria. We have to make an educated guess, based on what we know about common bacteria and antibiotics, as to which antibiotic will work for your infection. We will be correct most times but there will be some cases where the antibiotic chosen is not correct (see urine cultures below).  Take a pain medication such as acetaminophen (Tylenol ) or ibuprofen (Advil , Motrin , Nuprin ).  Phenazopyridine (Pyridium , Uristat ) is a prescription medication that numbs the bladder to reduce the burning pain of some UTIs.  The same medication is available in a non-prescription version (Azo-Standard , Urodol ). This medication will change the color of the urine and tears (usually blue or orange). If you wear contacts, do not wear them while taking this medication as they may be stained by the medication.    Urine Cultures:  If indicated, a urine culture may have been performed today. This test generally takes 24-48 hours to complete so the results are not known at this time. The results can confirm that an infection is present but also determine which antibiotic is effective for the specific bacteria that is causing the infection. If your urine culture shows that the antibiotic you were given today will not work to treat your infection, we will attempt to contact you to make arrangements to change the antibiotic. If the culture confirms that the antibiotic is effective for your infection, you will not be contacted. We often recommend follow-up with your regular physician/provider on the culture results regardless of this process.    Antibiotic Warning:   If you have been placed on antibiotics - watch for signs of allergic reaction.  These include rash, lip swelling, difficulty breathing, wheezing, and dizziness.  If you develop any of these symptoms, stop the antibiotic immediately and go to an  "emergency room or urgent care for evaluation.    Probiotics: If you have been given an antibiotic, you may want to also take a probiotic pill or eat yogurt with live cultures. Probiotics have \"good bacteria\" to help your intestines stay healthy. Studies have shown that probiotics help prevent diarrhea and other intestine problems (including C. diff infection) when you take antibiotics. You can buy these without a prescription in the pharmacy section of the store.   If you were given a prescription for medicine here today, be sure to read all of the information (including the package insert) that comes with your prescription.  This will include important information about the medicine, its side effects, and any warnings that you need to know about.  The pharmacist who fills the prescription can provide more information and answer questions you may have about the medicine.  If you have questions or concerns that the pharmacist cannot address, please call or return to the Emergency Department.   Remember that you can always come back to the Emergency Department if you are not able to see your regular provider in the amount of time listed above, if you get any new symptoms, or if there is anything that worries you.    "

## 2022-08-31 NOTE — PROGRESS NOTES
[facility]  [place] Medical Record Number:  [id number]  [id number]  Chief Complaint   Patient presents with     Kidney Problem       HPI:    William Ba  is a 87 year old (1935), who is being seen today for an episodic care visit.  HPI information obtained from: facility chart records, facility staff, patient report, [facility] chart review and family/first contact dtr report. Today's concern is: hematuria, a-fib, copd.  Last week noted to have increased confusion, hematuria. Sent to ED 8/28/22. abd CT showed L non obstructing renal stone. Started on omnicef for UTI. Hematuria has resolved. For a-fib cont. On xarelto, asa. HR stable. Has bruising, skin tear to LUE. For copd cont. On breo. Recent CAP.  Resp. Status currently stable.       Past Medical and Surgical History reviewed in Epic today.    MEDICATIONS:    Current Outpatient Medications   Medication Sig Dispense Refill     albuterol (PROAIR HFA/PROVENTIL HFA/VENTOLIN HFA) 108 (90 Base) MCG/ACT inhaler Inhale 2 puffs into the lungs every 4 hours as needed for shortness of breath / dyspnea or wheezing       atorvastatin (LIPITOR) 40 MG tablet Take 1 tablet (40 mg) by mouth At Bedtime 90 tablet 3     cefdinir (OMNICEF) 300 MG capsule Take 1 capsule (300 mg) by mouth 2 times daily for 7 days 14 capsule 0     diclofenac (VOLTAREN) 1 % topical gel Apply 2 g topically 2 times daily       donepezil (ARICEPT) 5 MG tablet Take 5 mg by mouth At Bedtime       EPINEPHrine (ANY BX GENERIC EQUIV) 0.3 MG/0.3ML injection 2-pack Inject 0.3 mLs (0.3 mg) into the muscle as needed for anaphylaxis 1 each 3     famotidine (PEPCID) 10 MG tablet Take 10 mg by mouth every evening       fluticasone-vilanterol (BREO ELLIPTA) 100-25 MCG/INH inhaler Inhale 1 puff into the lungs daily 1 Inhaler 11     metoprolol succinate ER (TOPROL-XL) 50 MG 24 hr tablet Take 1 tablet (50 mg) by mouth  daily 90 tablet 3     Misc Natural Products (OSTEO BI-FLEX ADV TRIPLE ST) TABS Take 1 capsule by mouth 2 times daily        Multiple Vitamins-Minerals (MULTIVITAMIN ADULT PO) Take one(1) tablet daily.       rivaroxaban ANTICOAGULANT (XARELTO) 20 MG TABS tablet Take 1 tablet (20 mg) by mouth daily (with dinner) 90 tablet 3       {  REVIEW OF SYSTEMS:  Unobtainable secondary to cognitive impairment. Reports feeling ok today    Objective:  /77   Pulse 63   Resp 18   SpO2 93%   Exam:  GENERAL APPEARANCE:  Alert, in no distress  ENT:  Mouth and posterior oropharynx normal, moist mucous membranes, [place]  EYES:  EOM, conjunctivae, lids, pupils and irises normal, PERRL  RESP:  respiratory effort and palpation of chest normal, lungs clear to auscultation , no respiratory distress, diminished breath sounds bibasilar  CV:  Palpation and auscultation of heart done , regular rate and rhythm, no murmur, rub, or gallop, no edema  ABDOMEN:  normal bowel sounds, soft, nontender, no hepatosplenomegaly or other masses, no guarding or rebound  M/S:   gait steady. muscle strength 5/5 all 4 ext.  NEURO:   Cranial nerves 2-12 are normal tested and grossly at patient's baseline, speech clear  PSYCH:  insight and judgement impaired, memory impaired , affect and mood normal    Labs:     Most Recent 3 CBC's:Recent Labs   Lab Test 08/28/22  1728 04/29/22  0940 02/01/22  1420   WBC 8.0 6.5 6.5   HGB 11.6* 12.2* 11.9*   * 104* 95   * 159 193     Most Recent 3 BMP's:Recent Labs   Lab Test 08/28/22  1728 04/29/22  0940 02/01/22  1420    144 140   POTASSIUM 4.5 3.7 3.4*   CHLORIDE 107 108* 109*   CO2 25 24 24   BUN 23.3* 19 11   CR 0.77 0.78 0.74   ANIONGAP 7 12 7   NIEVES 8.8 9.1 8.0*   GLC 99 105 63*       ASSESSMENT/PLAN:  (R31.0) Gross hematuria  (primary encounter diagnosis)  Comment: currently resolved  Plan: 1. Complete omnicef  2. Discontinue asa  3. Monitor for further hematuria  4. Hgb in next couple weeks  5. PSA  in next couple weeks    (I48.91) Atrial fibrillation, unspecified type (H)  Comment: rate stable.   Plan: 1. Cont. Xarelto, metoprolol  2. dcd asa as above  3. Follow HRs    (J44.9) Chronic obstructive pulmonary disease, unspecified COPD type (H)  Comment: resp. Status currently stable. Recent CAP  Plan: 1. Cont. Breo  2. Follow temps, O2 sats  3. Monitor for wheezing-cont. Prn alb MDI    Electronically signed by:  EYAD Diaz CNP

## 2022-08-31 NOTE — LETTER
8/31/2022        RE: Willaim Ba  [address] [facility] [facility] [address]        [facility]  [place] [facility] Record Number:  [id number]  Place of Service where encounter took place:  [facility] ([facility] [[id number]]  Chief Complaint   Patient presents with     Kidney Problem       HPI:    William Ba  is a 87 year old (1935), who is being seen today for an episodic care visit.  HPI information obtained from: facility chart records, facility staff, patient report, [facility] chart review and family/first contact dtr report. Today's concern is: hematuria, a-fib, copd.  Last week noted to have increased confusion, hematuria. Sent to ED 8/28/22. abd CT showed L non obstructing renal stone. Started on omnicef for UTI. Hematuria has resolved. For a-fib cont. On xarelto, asa. HR stable. Has bruising, skin tear to LUE. For copd cont. On breo. Recent CAP.  Resp. Status currently stable.       Past Medical and Surgical History reviewed in Epic today.    MEDICATIONS:    Current Outpatient Medications   Medication Sig Dispense Refill     albuterol (PROAIR HFA/PROVENTIL HFA/VENTOLIN HFA) 108 (90 Base) MCG/ACT inhaler Inhale 2 puffs into the lungs every 4 hours as needed for shortness of breath / dyspnea or wheezing       atorvastatin (LIPITOR) 40 MG tablet Take 1 tablet (40 mg) by mouth At Bedtime 90 tablet 3     cefdinir (OMNICEF) 300 MG capsule Take 1 capsule (300 mg) by mouth 2 times daily for 7 days 14 capsule 0     diclofenac (VOLTAREN) 1 % topical gel Apply 2 g topically 2 times daily       donepezil (ARICEPT) 5 MG tablet Take 5 mg by mouth At Bedtime       EPINEPHrine (ANY BX GENERIC EQUIV) 0.3 MG/0.3ML injection 2-pack Inject 0.3 mLs (0.3 mg) into the muscle as needed for anaphylaxis 1 each 3     famotidine (PEPCID) 10 MG tablet Take 10 mg by mouth every evening       fluticasone-vilanterol (BREO ELLIPTA) 100-25 MCG/INH inhaler Inhale 1 puff into the lungs daily 1 Inhaler 11      metoprolol succinate ER (TOPROL-XL) 50 MG 24 hr tablet Take 1 tablet (50 mg) by mouth daily 90 tablet 3     Misc Natural Products (OSTEO BI-FLEX ADV TRIPLE ST) TABS Take 1 capsule by mouth 2 times daily        Multiple Vitamins-Minerals (MULTIVITAMIN ADULT PO) Take one(1) tablet daily.       rivaroxaban ANTICOAGULANT (XARELTO) 20 MG TABS tablet Take 1 tablet (20 mg) by mouth daily (with dinner) 90 tablet 3       {  REVIEW OF SYSTEMS:  Unobtainable secondary to cognitive impairment. Reports feeling ok today    Objective:  /77   Pulse 63   Resp 18   SpO2 93%   Exam:  GENERAL APPEARANCE:  Alert, in no distress  ENT:  Mouth and posterior oropharynx normal, moist mucous membranes, [place]  EYES:  EOM, conjunctivae, lids, pupils and irises normal, PERRL  RESP:  respiratory effort and palpation of chest normal, lungs clear to auscultation , no respiratory distress, diminished breath sounds bibasilar  CV:  Palpation and auscultation of heart done , regular rate and rhythm, no murmur, rub, or gallop, no edema  ABDOMEN:  normal bowel sounds, soft, nontender, no hepatosplenomegaly or other masses, no guarding or rebound  M/S:   gait steady. muscle strength 5/5 all 4 ext.  NEURO:   Cranial nerves 2-12 are normal tested and grossly at patient's baseline, speech clear  PSYCH:  insight and judgement impaired, memory impaired , affect and mood normal    Labs:     Most Recent 3 CBC's:Recent Labs   Lab Test 08/28/22  1728 04/29/22  0940 02/01/22  1420   WBC 8.0 6.5 6.5   HGB 11.6* 12.2* 11.9*   * 104* 95   * 159 193     Most Recent 3 BMP's:Recent Labs   Lab Test 08/28/22  1728 04/29/22  0940 02/01/22  1420    144 140   POTASSIUM 4.5 3.7 3.4*   CHLORIDE 107 108* 109*   CO2 25 24 24   BUN 23.3* 19 11   CR 0.77 0.78 0.74   ANIONGAP 7 12 7   NIEVES 8.8 9.1 8.0*   GLC 99 105 63*       ASSESSMENT/PLAN:  (R31.0) Gross hematuria  (primary encounter diagnosis)  Comment: currently resolved  Plan: 1. Complete omnicef  2.  Discontinue asa  3. Monitor for further hematuria  4. Hgb in next couple weeks  5. PSA in next couple weeks    (I48.91) Atrial fibrillation, unspecified type (H)  Comment: rate stable.   Plan: 1. Cont. Xarelto, metoprolol  2. dcd asa as above  3. Follow HRs    (J44.9) Chronic obstructive pulmonary disease, unspecified COPD type (H)  Comment: resp. Status currently stable. Recent CAP  Plan: 1. Cont. Breo  2. Follow temps, O2 sats  3. Monitor for wheezing-cont. Prn alb MDI    Electronically signed by:  EYAD Diaz CNP                 Sincerely,        EYAD Diaz CNP

## 2022-10-12 PROBLEM — Z86.79 HISTORY OF ABDOMINAL AORTIC ANEURYSM (AAA): Status: ACTIVE | Noted: 2022-01-01

## 2022-10-12 NOTE — PROGRESS NOTES
[place] [facility]  [place] Medical Record Number:  [id number]  [facility] [233]  No chief complaint on file.      HPI:    William Ba  is a 87 year old (1935), who is being seen today for an episodic care visit.  HPI information obtained from: facility chart records, facility staff, patient report, [facility] chart review and family/first contact dtr report. Today's concern is: cerumen, prostate cancer, insomnia. Has recurrent cerumen.  Has had debrox gtts past week. H/p prostate ca. No longer followed by oncology. Recent PSA wnl. Did have hematuria 8/31/22. Sent to ED found to  Have L non-obstructing renal stone, UTI. tx with anbtx. Hematuria resolved. Per dtr, resident reported insomnia. Spouse recent hosp, at rehab, not in apt. With resident.  Started on melatonin.       Past Medical and Surgical History reviewed in Epic today.    MEDICATIONS:    Current Outpatient Medications   Medication Sig Dispense Refill     albuterol (PROAIR HFA/PROVENTIL HFA/VENTOLIN HFA) 108 (90 Base) MCG/ACT inhaler Inhale 2 puffs into the lungs every 4 hours as needed for shortness of breath / dyspnea or wheezing       atorvastatin (LIPITOR) 40 MG tablet Take 1 tablet (40 mg) by mouth At Bedtime 90 tablet 3     diclofenac (VOLTAREN) 1 % topical gel Apply 2 g topically 2 times daily       donepezil (ARICEPT) 5 MG tablet Take 5 mg by mouth At Bedtime       EPINEPHrine (ANY BX GENERIC EQUIV) 0.3 MG/0.3ML injection 2-pack Inject 0.3 mLs (0.3 mg) into the muscle as needed for anaphylaxis 1 each 3     famotidine (PEPCID) 10 MG tablet Take 10 mg by mouth every evening       fluticasone-vilanterol (BREO ELLIPTA) 100-25 MCG/INH inhaler Inhale 1 puff into the lungs daily 1 Inhaler 11     metoprolol succinate ER (TOPROL-XL) 50 MG 24 hr tablet Take 1 tablet (50 mg) by mouth daily 90 tablet 3     Misc Natural Products (OSTEO BI-FLEX ADV TRIPLE ST) TABS Take 1  "capsule by mouth 2 times daily        Multiple Vitamins-Minerals (MULTIVITAMIN ADULT PO) Take one(1) tablet daily.       rivaroxaban ANTICOAGULANT (XARELTO) 20 MG TABS tablet Take 1 tablet (20 mg) by mouth daily (with dinner) 90 tablet 3         REVIEW OF SYSTEMS:  Limited secondary to cognitive impairment but today pt reports feeling ok. No current pain    Objective:  Ht 1.791 m (5' 10.5\")   BMI 23.51 kg/m    Exam:  GENERAL APPEARANCE:  Alert, in no distress  ENT:  Mouth and posterior oropharynx normal, moist mucous membranes, [place], bilat ear canals free of cerumen s/p removal with curette  EYES:  EOM, conjunctivae, lids, pupils and irises normal, PERRL  RESP:  respiratory effort and palpation of chest normal, lungs clear to auscultation , no respiratory distress, diminished breath sounds bibasilar  CV:  Palpation and auscultation of heart done , regular rate and rhythm, no murmur, rub, or gallop, no edema  ABDOMEN:  normal bowel sounds, soft, nontender, no hepatosplenomegaly or other masses, no guarding or rebound  M/S:   Gait and station normal  muscle strength 5/5 all 4 ext, normal tone  NEURO:   Cranial nerves 2-12 are normal tested and grossly at patient's baseline, no tremor  PSYCH:  insight and judgement impaired, memory impaired , affect and mood normal    Labs:     Most Recent 3 CBC's:Recent Labs   Lab Test 09/13/22  0651 08/28/22  1728 04/29/22  0940 02/01/22  1420   WBC  --  8.0 6.5 6.5   HGB 12.4* 11.6* 12.2* 11.9*   MCV  --  102* 104* 95   PLT  --  135* 159 193     Most Recent 3 BMP's:Recent Labs   Lab Test 08/28/22  1728 04/29/22  0940 02/01/22  1420    144 140   POTASSIUM 4.5 3.7 3.4*   CHLORIDE 107 108* 109*   CO2 25 24 24   BUN 23.3* 19 11   CR 0.77 0.78 0.74   ANIONGAP 7 12 7   NIEVES 8.8 9.1 8.0*   GLC 99 105 63*       ASSESSMENT/PLAN:  (H61.21) Impacted cerumen of right ear  (primary encounter diagnosis)  Comment: recurrent. Currently cleared  Plan: 1. Monitor for further cerumen  2. For " above repeat debrox  3. Irrigate prn    (C61) Malignant neoplasm of prostate (H)  Comment: PSA wnl. Recent hematuria, dx with UTI, non-obstructing renal stone  Plan: 1. Per discussion with dtr, no further w/u at this time due to comfort care focus  2. Monitor for hematuria  3. Monitor for abd pain, dysuria    (F51.01) Primary insomnia  Comment: no reports of hs wandering per staff. May be r/t spouse not living with resident in apt. Due to rehab. Melatonin started  Plan: 1.cont melatonin  2. Monitor for reports of increase in s/s  3. Spouse to return to apt with resident once therapy complete    Electronically signed by:  EYAD Diaz CNP

## 2022-10-12 NOTE — LETTER
10/12/2022        RE: William Ba  [address]  [facility] [address]        [place] [facility]  [place] Medical Record Number:  [id number]  Place of Service where encounter took place[facility] ([facility]) [233]  No chief complaint on file.      HPI:    William Ba  is a 87 year old (1935), who is being seen today for an episodic care visit.  HPI information obtained from: facility chart records, facility staff, patient report, [facility] chart review and family/first contact dtr report. Today's concern is: cerumen, prostate cancer, insomnia. Has recurrent cerumen.  Has had debrox gtts past week. H/p prostate ca. No longer followed by oncology. Recent PSA wnl. Did have hematuria 8/31/22. Sent to ED found to  Have L non-obstructing renal stone, UTI. tx with anbtx. Hematuria resolved. Per dtr, resident reported insomnia. Spouse recent hosp, at rehab, not in apt. With resident.  Started on melatonin.       Past Medical and Surgical History reviewed in Epic today.    MEDICATIONS:    Current Outpatient Medications   Medication Sig Dispense Refill     albuterol (PROAIR HFA/PROVENTIL HFA/VENTOLIN HFA) 108 (90 Base) MCG/ACT inhaler Inhale 2 puffs into the lungs every 4 hours as needed for shortness of breath / dyspnea or wheezing       atorvastatin (LIPITOR) 40 MG tablet Take 1 tablet (40 mg) by mouth At Bedtime 90 tablet 3     diclofenac (VOLTAREN) 1 % topical gel Apply 2 g topically 2 times daily       donepezil (ARICEPT) 5 MG tablet Take 5 mg by mouth At Bedtime       EPINEPHrine (ANY BX GENERIC EQUIV) 0.3 MG/0.3ML injection 2-pack Inject 0.3 mLs (0.3 mg) into the muscle as needed for anaphylaxis 1 each 3     famotidine (PEPCID) 10 MG tablet Take 10 mg by mouth every evening       fluticasone-vilanterol (BREO ELLIPTA) 100-25 MCG/INH inhaler Inhale 1 puff into the lungs daily 1 Inhaler 11     metoprolol succinate ER (TOPROL-XL) 50 MG 24 hr tablet Take 1 tablet (50 mg) by  "mouth daily 90 tablet 3     [facility] Natural Products (OSTEO BI-FLEX ADV TRIPLE ST) TABS Take 1 capsule by mouth 2 times daily        Multiple Vitamins-Minerals (MULTIVITAMIN ADULT PO) Take one(1) tablet daily.       rivaroxaban ANTICOAGULANT (XARELTO) 20 MG TABS tablet Take 1 tablet (20 mg) by mouth daily (with dinner) 90 tablet 3         REVIEW OF SYSTEMS:  Limited secondary to cognitive impairment but today pt reports feeling ok. No current pain    Objective:  Ht 1.791 m (5' 10.5\")   BMI 23.51 kg/m    Exam:  GENERAL APPEARANCE:  Alert, in no distress  ENT:  Mouth and posterior oropharynx normal, moist mucous membranes, [place], bilat ear canals free of cerumen s/p removal with curette  EYES:  EOM, conjunctivae, lids, pupils and irises normal, PERRL  RESP:  respiratory effort and palpation of chest normal, lungs clear to auscultation , no respiratory distress, diminished breath sounds bibasilar  CV:  Palpation and auscultation of heart done , regular rate and rhythm, no murmur, rub, or gallop, no edema  ABDOMEN:  normal bowel sounds, soft, nontender, no hepatosplenomegaly or other masses, no guarding or rebound  M/S:   Gait and station normal  muscle strength 5/5 all 4 ext, normal tone  NEURO:   Cranial nerves 2-12 are normal tested and grossly at patient's baseline, no tremor  PSYCH:  insight and judgement impaired, memory impaired , affect and mood normal    Labs:     Most Recent 3 CBC's:Recent Labs   Lab Test 09/13/22  0651 08/28/22  1728 04/29/22  0940 02/01/22  1420   WBC  --  8.0 6.5 6.5   HGB 12.4* 11.6* 12.2* 11.9*   MCV  --  102* 104* 95   PLT  --  135* 159 193     Most Recent 3 BMP's:Recent Labs   Lab Test 08/28/22  1728 04/29/22  0940 02/01/22  1420    144 140   POTASSIUM 4.5 3.7 3.4*   CHLORIDE 107 108* 109*   CO2 25 24 24   BUN 23.3* 19 11   CR 0.77 0.78 0.74   ANIONGAP 7 12 7   NIEVES 8.8 9.1 8.0*   GLC 99 105 63*       ASSESSMENT/PLAN:  (H61.21) Impacted cerumen of right ear  (primary encounter " diagnosis)  Comment: recurrent. Currently cleared  Plan: 1. Monitor for further cerumen  2. For above repeat debrox  3. Irrigate prn    (C61) Malignant neoplasm of prostate (H)  Comment: PSA wnl. Recent hematuria, dx with UTI, non-obstructing renal stone  Plan: 1. Per discussion with dtr, no further w/u at this time due to comfort care focus  2. Monitor for hematuria  3. Monitor for abd pain, dysuria    (F51.01) Primary insomnia  Comment: no reports of hs wandering per staff. May be r/t spouse not living with resident in apt. Due to rehab. Melatonin started  Plan: 1.cont melatonin  2. Monitor for reports of increase in s/s  3. Spouse to return to apt with resident once therapy complete    Electronically signed by:  EYAD Diaz CNP                 Sincerely,        EYAD Diaz CNP

## 2022-11-02 NOTE — LETTER
11/2/2022        RE: [facility] Ba  [address] [facility] [address]        [place] [facility]  [place] Medical Record Number:  [id number]  [facility] of Service where encounter took place[facility] ([facility]) [233]  Chief Complaint   Patient presents with     Cough       HPI:    William Ba  is a 87 year old (1935), who is being seen today for an episodic care visit.  HPI information obtained from: facility chart records, facility staff, patient report, [facility] chart review and family/first contact dtr report. Today's concern is: cough, a-fib, GERD. Has newer cough. Cont. On Breo, alb MDI for copd. O2 sats stable. No fever. Per dtr, resident c/o lower abd pain with coughing at times.  Has h/o inguinal hernia repair.  Bowels moving well. Cont. On pepcid for GERD. No recent nausea. Po intake stable. For a-fib cont. On xarelto, metoprolol. HR stable. No recent reports of dizziness      Past Medical and Surgical History reviewed in Epic today.    MEDICATIONS:    Current Outpatient Medications   Medication Sig Dispense Refill     albuterol (PROAIR HFA/PROVENTIL HFA/VENTOLIN HFA) 108 (90 Base) MCG/ACT inhaler Inhale 2 puffs into the lungs every 4 hours as needed for shortness of breath / dyspnea or wheezing       atorvastatin (LIPITOR) 40 MG tablet Take 1 tablet (40 mg) by mouth At Bedtime 90 tablet 3     diclofenac (VOLTAREN) 1 % topical gel Apply 2 g topically 2 times daily       donepezil (ARICEPT) 5 MG tablet Take 5 mg by mouth At Bedtime       EPINEPHrine (ANY BX GENERIC EQUIV) 0.3 MG/0.3ML injection 2-pack Inject 0.3 mLs (0.3 mg) into the muscle as needed for anaphylaxis 1 each 3     famotidine (PEPCID) 10 MG tablet Take 10 mg by mouth every evening       fluticasone-vilanterol (BREO ELLIPTA) 100-25 MCG/INH inhaler Inhale 1 puff into the lungs daily 1 Inhaler 11     metoprolol succinate ER (TOPROL-XL) 50 MG 24 hr tablet Take 1 tablet (50 mg) by mouth daily 90 tablet 3  "    Misc Natural Products (OSTEO BI-FLEX ADV TRIPLE ST) TABS Take 1 capsule by mouth 2 times daily        Multiple Vitamins-Minerals (MULTIVITAMIN ADULT PO) Take one(1) tablet daily.       rivaroxaban ANTICOAGULANT (XARELTO) 20 MG TABS tablet Take 1 tablet (20 mg) by mouth daily (with dinner) 90 tablet 3         REVIEW OF SYSTEMS:  Limited secondary to cognitive impairment but today pt reports cough. No current abd pain    Objective:  /77   Pulse 89   Temp 98.1  F (36.7  C)   Resp 18   Ht 1.791 m (5' 10.5\")   SpO2 92%   BMI 23.51 kg/m    Exam:  GENERAL APPEARANCE:  Alert, in no distress  ENT:  Mouth and posterior oropharynx normal, moist mucous membranes, [place], no rhinorrhea  EYES:  EOM, conjunctivae, lids, pupils and irises normal, PERRL  RESP:  diminished breath sounds bibasilar, few scattered exp. wheezes. prod. cough. no accessory muscle use  CV:  Palpation and auscultation of heart done , regular rate and rhythm, no murmur, rub, or gallop, no edema  ABDOMEN:  normal bowel sounds, soft, nontender, no hepatosplenomegaly or other masses, no guarding or rebound  M/S:   Gait and station normal  muscle strength 5/5 all 4 ext.  NEURO:   Cranial nerves 2-12 are normal tested and grossly at patient's baseline, no tremor  PSYCH:  insight and judgement impaired, memory impaired , affect and mood normal    Labs:     Most Recent 3 CBC's:Recent Labs   Lab Test 09/13/22  0651 08/28/22  1728 04/29/22  0940 02/01/22  1420   WBC  --  8.0 6.5 6.5   HGB 12.4* 11.6* 12.2* 11.9*   MCV  --  102* 104* 95   PLT  --  135* 159 193     Most Recent 3 BMP's:Recent Labs   Lab Test 08/28/22  1728 04/29/22  0940 02/01/22  1420    144 140   POTASSIUM 4.5 3.7 3.4*   CHLORIDE 107 108* 109*   CO2 25 24 24   BUN 23.3* 19 11   CR 0.77 0.78 0.74   ANIONGAP 7 12 7   NIEVES 8.8 9.1 8.0*   GLC 99 105 63*       ASSESSMENT/PLAN:  (R05.9) Cough, unspecified type  (primary encounter diagnosis)  Comment: newer onset. Current wheezing.   Plan: " 1. Cont. Breo  2. Cont. Alb MDI  3. CXR today  4. Start bid mucinex  5. Follow temps, O2 sats  6. No current abd pain. No current palpable hernia    (I48.91) Atrial fibrillation, unspecified type (H)  Comment: HR stable  Plan: 1. Cont. xarelto  2. Cont. Metoprolol  3. Follow HRs  4. Encourage fluids, monitor for dizziness    (K21.9) Gastroesophageal reflux disease, unspecified whether esophagitis present  Comment: currently stable  Plan: 1. Cont. pepcid  2. Monitor for nausea  3. Hgb, bmp in next 1-3 mos    Electronically signed by:  EYAD Diaz CNP                 Sincerely,        EYAD Diaz CNP

## 2022-11-02 NOTE — TELEPHONE ENCOUNTER
[facility] RN called and wanted to get a message to the primary NP about Ba as her understanding he was to be at [facility] tomorrow.    Wanted him to know of Ba's increase coughing, gray thick chunky phlegm and rhonchi present on lung sounds.    Vitals stable.    Will pass on the message to primary NP.    Electronically signed by Randa Mayberry RN, CNP

## 2022-11-02 NOTE — PROGRESS NOTES
[place] [facility]  [place] Medical Record Number:  [id number]  [id number]  Chief Complaint   Patient presents with     Cough       HPI:    William Ba  is a 87 year old (1935), who is being seen today for an episodic care visit.  HPI information obtained from: facility chart records, facility staff, patient report, [facility] chart review and family/first contact dtr report. Today's concern is: cough, a-fib, GERD. Has newer cough. Cont. On Breo, alb MDI for copd. O2 sats stable. No fever. Per dtr, resident c/o lower abd pain with coughing at times.  Has h/o inguinal hernia repair.  Bowels moving well. Cont. On pepcid for GERD. No recent nausea. Po intake stable. For a-fib cont. On xarelto, metoprolol. HR stable. No recent reports of dizziness      Past Medical and Surgical History reviewed in Epic today.    MEDICATIONS:    Current Outpatient Medications   Medication Sig Dispense Refill     albuterol (PROAIR HFA/PROVENTIL HFA/VENTOLIN HFA) 108 (90 Base) MCG/ACT inhaler Inhale 2 puffs into the lungs every 4 hours as needed for shortness of breath / dyspnea or wheezing       atorvastatin (LIPITOR) 40 MG tablet Take 1 tablet (40 mg) by mouth At Bedtime 90 tablet 3     diclofenac (VOLTAREN) 1 % topical gel Apply 2 g topically 2 times daily       donepezil (ARICEPT) 5 MG tablet Take 5 mg by mouth At Bedtime       EPINEPHrine (ANY BX GENERIC EQUIV) 0.3 MG/0.3ML injection 2-pack Inject 0.3 mLs (0.3 mg) into the muscle as needed for anaphylaxis 1 each 3     famotidine (PEPCID) 10 MG tablet Take 10 mg by mouth every evening       fluticasone-vilanterol (BREO ELLIPTA) 100-25 MCG/INH inhaler Inhale 1 puff into the lungs daily 1 Inhaler 11     metoprolol succinate ER (TOPROL-XL) 50 MG 24 hr tablet Take 1 tablet (50 mg) by mouth daily 90 tablet 3     Misc Natural Products (OSTEO BI-FLEX ADV TRIPLE ST) TABS Take 1 capsule by mouth 2  "times daily        Multiple Vitamins-Minerals (MULTIVITAMIN ADULT PO) Take one(1) tablet daily.       rivaroxaban ANTICOAGULANT (XARELTO) 20 MG TABS tablet Take 1 tablet (20 mg) by mouth daily (with dinner) 90 tablet 3         REVIEW OF SYSTEMS:  Limited secondary to cognitive impairment but today pt reports cough. No current abd pain    Objective:  /77   Pulse 89   Temp 98.1  F (36.7  C)   Resp 18   Ht 1.791 m (5' 10.5\")   SpO2 92%   BMI 23.51 kg/m    Exam:  GENERAL APPEARANCE:  Alert, in no distress  ENT:  Mouth and posterior oropharynx normal, moist mucous membranes, [place], no rhinorrhea  EYES:  EOM, conjunctivae, lids, pupils and irises normal, PERRL  RESP:  diminished breath sounds bibasilar, few scattered exp. wheezes. prod. cough. no accessory muscle use  CV:  Palpation and auscultation of heart done , regular rate and rhythm, no murmur, rub, or gallop, no edema  ABDOMEN:  normal bowel sounds, soft, nontender, no hepatosplenomegaly or other masses, no guarding or rebound  M/S:   Gait and station normal  muscle strength 5/5 all 4 ext.  NEURO:   Cranial nerves 2-12 are normal tested and grossly at patient's baseline, no tremor  PSYCH:  insight and judgement impaired, memory impaired , affect and mood normal    Labs:     Most Recent 3 CBC's:Recent Labs   Lab Test 09/13/22  0651 08/28/22  1728 04/29/22  0940 02/01/22  1420   WBC  --  8.0 6.5 6.5   HGB 12.4* 11.6* 12.2* 11.9*   MCV  --  102* 104* 95   PLT  --  135* 159 193     Most Recent 3 BMP's:Recent Labs   Lab Test 08/28/22  1728 04/29/22  0940 02/01/22  1420    144 140   POTASSIUM 4.5 3.7 3.4*   CHLORIDE 107 108* 109*   CO2 25 24 24   BUN 23.3* 19 11   CR 0.77 0.78 0.74   ANIONGAP 7 12 7   NIEVES 8.8 9.1 8.0*   GLC 99 105 63*       ASSESSMENT/PLAN:  (R05.9) Cough, unspecified type  (primary encounter diagnosis)  Comment: newer onset. Current wheezing.   Plan: 1. Cont. Breo  2. Cont. Alb MDI  3. CXR today  4. Start bid mucinex  5. Follow temps, O2 " sats  6. No current abd pain. No current palpable hernia    (I48.91) Atrial fibrillation, unspecified type (H)  Comment: HR stable  Plan: 1. Cont. xarelto  2. Cont. Metoprolol  3. Follow HRs  4. Encourage fluids, monitor for dizziness    (K21.9) Gastroesophageal reflux disease, unspecified whether esophagitis present  Comment: currently stable  Plan: 1. Cont. pepcid  2. Monitor for nausea  3. Hgb, bmp in next 1-3 mos    Electronically signed by:  EYAD Diaz CNP

## 2022-11-09 NOTE — LETTER
11/9/2022        RE: William Ba  [address] [address]        [place] [facility]  [place] Medical Record Number:  [id number]  [facility] of Service where encounter took place[facility] ([facility] [233]  Chief Complaint   Patient presents with     Cough       HPI:    William Ba  is a 87 year old (1935), who is being seen today for an episodic care visit.  HPI information obtained from: facility chart records, facility staff, patient report and [facility] chart review. Today's concern is: cough, copd, a-fib, 11/2/22 had cough. tx with anbtx for pneumonia. Has ongoing cough at times. For copd cont. On proair, Breo. O2 sats stable O2 sats stable. Act. Level baseline. fro a-fib cont. On eliquis, toprol xl. HRs stable. No reports of dizziness.       Past Medical and Surgical History reviewed in Epic today.    MEDICATIONS:    Current Outpatient Medications   Medication Sig Dispense Refill     albuterol (PROAIR HFA/PROVENTIL HFA/VENTOLIN HFA) 108 (90 Base) MCG/ACT inhaler Inhale 2 puffs into the lungs every 4 hours as needed for shortness of breath / dyspnea or wheezing       atorvastatin (LIPITOR) 40 MG tablet Take 1 tablet (40 mg) by mouth At Bedtime 90 tablet 3     diclofenac (VOLTAREN) 1 % topical gel Apply 2 g topically 2 times daily       donepezil (ARICEPT) 5 MG tablet Take 5 mg by mouth At Bedtime       EPINEPHrine (ANY BX GENERIC EQUIV) 0.3 MG/0.3ML injection 2-pack Inject 0.3 mLs (0.3 mg) into the muscle as needed for anaphylaxis 1 each 3     famotidine (PEPCID) 10 MG tablet Take 10 mg by mouth every evening       fluticasone-vilanterol (BREO ELLIPTA) 100-25 MCG/INH inhaler Inhale 1 puff into the lungs daily 1 Inhaler 11     metoprolol succinate ER (TOPROL-XL) 50 MG 24 hr tablet Take 1 tablet (50 mg) by mouth daily 90 tablet 3     Misc Natural Products (OSTEO BI-FLEX ADV TRIPLE ST) TABS Take 1 capsule by mouth 2 times daily        Multiple Vitamins-Minerals  "(MULTIVITAMIN ADULT PO) Take one(1) tablet daily.       rivaroxaban ANTICOAGULANT (XARELTO) 20 MG TABS tablet Take 1 tablet (20 mg) by mouth daily (with dinner) 90 tablet 3         REVIEW OF SYSTEMS:  Limited secondary to cognitive impairment but today pt reports occ cough    Objective:  /77   Pulse 84   Temp 98  F (36.7  C)   Resp 18   Ht 1.791 m (5' 10.5\")   SpO2 94%   BMI 23.51 kg/m    Exam:  GENERAL APPEARANCE:  Alert, in no distress  ENT:  Mouth and posterior oropharynx normal, moist mucous membranes, [place], no rhinorrhea  EYES:  EOM, conjunctivae, lids, pupils and irises normal, PERRL  RESP:  respiratory effort and palpation of chest normal, diminished breath sounds bibasilar, few scattered exp. wheezes.   CV:  Palpation and auscultation of heart done , regular rate and rhythm, no murmur, rub, or gallop, no edema  ABDOMEN:  normal bowel sounds, soft, nontender, no hepatosplenomegaly or other masses, no guarding or rebound  M/S:   Gait and station normal  muscle strength 5/5 all 4 ext.  NEURO:   Cranial nerves 2-12 are normal tested and grossly at patient's baseline, no tremor  PSYCH:  insight and judgement impaired, memory impaired , affect and mood normal    Labs:     Most Recent 3 BMP's:Recent Labs   Lab Test 08/28/22  1728 04/29/22  0940 02/01/22  1420    144 140   POTASSIUM 4.5 3.7 3.4*   CHLORIDE 107 108* 109*   CO2 25 24 24   BUN 23.3* 19 11   CR 0.77 0.78 0.74   ANIONGAP 7 12 7   NIEVES 8.8 9.1 8.0*   GLC 99 105 63*       ASSESSMENT/PLAN:  (R05.9) Cough, unspecified type  (primary encounter diagnosis)  Comment: improved. S/p anbtx for pneumonia  Plan: 1. Cont. mucinex  2. Encourage to sit up, amb. Throughout the day  3. Follow temps, O2 sats    (J44.9) Chronic obstructive pulmonary disease, unspecified COPD type (H)  Comment: some ongoing exp. Wheezes. O2 sats stable  Plan: 1. Cont. proair  2. Cont. Breo  3. For increased wheezing consider prednisone    (I48.91) Atrial fibrillation, " unspecified type (H)  Comment: HR stable  Plan: 1. Cont. eliquis  2. Cont. toprol xl  3. Follow BPs, HRs  4. Encourage fluids  5. Monitor for dizziness    Electronically signed by:  EYAD Diaz CNP                 Sincerely,        EYAD Diaz CNP

## 2022-11-09 NOTE — PROGRESS NOTES
[facility]  [place] Medical Record Number:  [id number]  [facility] [[facility]  Chief Complaint   Patient presents with     Cough       HPI:    William Ba  is a 87 year old (1935), who is being seen today for an episodic care visit.  HPI information obtained from: facility chart records, facility staff, patient report and [facility] chart review. Today's concern is: cough, copd, a-fib, 11/2/22 had cough. tx with anbtx for pneumonia. Has ongoing cough at times. For copd cont. On proair, Breo. O2 sats stable O2 sats stable. Act. Level baseline. fro a-fib cont. On eliquis, toprol xl. HRs stable. No reports of dizziness.       Past Medical and Surgical History reviewed in Epic today.    MEDICATIONS:    Current Outpatient Medications   Medication Sig Dispense Refill     albuterol (PROAIR HFA/PROVENTIL HFA/VENTOLIN HFA) 108 (90 Base) MCG/ACT inhaler Inhale 2 puffs into the lungs every 4 hours as needed for shortness of breath / dyspnea or wheezing       atorvastatin (LIPITOR) 40 MG tablet Take 1 tablet (40 mg) by mouth At Bedtime 90 tablet 3     diclofenac (VOLTAREN) 1 % topical gel Apply 2 g topically 2 times daily       donepezil (ARICEPT) 5 MG tablet Take 5 mg by mouth At Bedtime       EPINEPHrine (ANY BX GENERIC EQUIV) 0.3 MG/0.3ML injection 2-pack Inject 0.3 mLs (0.3 mg) into the muscle as needed for anaphylaxis 1 each 3     famotidine (PEPCID) 10 MG tablet Take 10 mg by mouth every evening       fluticasone-vilanterol (BREO ELLIPTA) 100-25 MCG/INH inhaler Inhale 1 puff into the lungs daily 1 Inhaler 11     metoprolol succinate ER (TOPROL-XL) 50 MG 24 hr tablet Take 1 tablet (50 mg) by mouth daily 90 tablet 3     Misc Natural Products (OSTEO BI-FLEX ADV TRIPLE ST) TABS Take 1 capsule by mouth 2 times daily        Multiple Vitamins-Minerals (MULTIVITAMIN ADULT PO) Take one(1) tablet daily.       rivaroxaban ANTICOAGULANT  "(XARELTO) 20 MG TABS tablet Take 1 tablet (20 mg) by mouth daily (with dinner) 90 tablet 3         REVIEW OF SYSTEMS:  Limited secondary to cognitive impairment but today pt reports occ cough    Objective:  /77   Pulse 84   Temp 98  F (36.7  C)   Resp 18   Ht 1.791 m (5' 10.5\")   SpO2 94%   BMI 23.51 kg/m    Exam:  GENERAL APPEARANCE:  Alert, in no distress  ENT:  Mouth and posterior oropharynx normal, moist mucous membranes, [place], no rhinorrhea  EYES:  EOM, conjunctivae, lids, pupils and irises normal, PERRL  RESP:  respiratory effort and palpation of chest normal, diminished breath sounds bibasilar, few scattered exp. wheezes.   CV:  Palpation and auscultation of heart done , regular rate and rhythm, no murmur, rub, or gallop, no edema  ABDOMEN:  normal bowel sounds, soft, nontender, no hepatosplenomegaly or other masses, no guarding or rebound  M/S:   Gait and station normal  muscle strength 5/5 all 4 ext.  NEURO:   Cranial nerves 2-12 are normal tested and grossly at patient's baseline, no tremor  PSYCH:  insight and judgement impaired, memory impaired , affect and mood normal    Labs:     Most Recent 3 BMP's:Recent Labs   Lab Test 08/28/22  1728 04/29/22  0940 02/01/22  1420    144 140   POTASSIUM 4.5 3.7 3.4*   CHLORIDE 107 108* 109*   CO2 25 24 24   BUN 23.3* 19 11   CR 0.77 0.78 0.74   ANIONGAP 7 12 7   NIEVES 8.8 9.1 8.0*   GLC 99 105 63*       ASSESSMENT/PLAN:  (R05.9) Cough, unspecified type  (primary encounter diagnosis)  Comment: improved. S/p anbtx for pneumonia  Plan: 1. Cont. mucinex  2. Encourage to sit up, amb. Throughout the day  3. Follow temps, O2 sats    (J44.9) Chronic obstructive pulmonary disease, unspecified COPD type (H)  Comment: some ongoing exp. Wheezes. O2 sats stable  Plan: 1. Cont. proair  2. Cont. Breo  3. For increased wheezing consider prednisone    (I48.91) Atrial fibrillation, unspecified type (H)  Comment: HR stable  Plan: 1. Cont. eliquis  2. Cont. toprol xl  3. " Follow BPs, HRs  4. Encourage fluids  5. Monitor for dizziness    Electronically signed by:  EYAD Diaz CNP

## 2022-11-25 NOTE — LETTER
New orders for William Ba    1. Orders for [employer] nebulizer or like machine and supplies          EYAD Srivastava CNP on 11/25/2022 at 3:56 PM

## 2022-11-25 NOTE — PROGRESS NOTES
Medical Necessity Statement for DME    Demographic Information on William Ba:  Gender: male  : 1935  [address] [facility] [address]  [phone] (home)     Medical Record: [id number]  Social Security Number: [id number]  Primary Care Provider: Abraham Velez  Insurance: Payor: MEDICARE / Plan: MEDICARE / Product Type: Medicare /     HPI:   William Ba is a 87 year old  (1935), who has a medical history of COPD with frequent episodes of recurring flares of pneumonia. Today nursing reports his nebulizer was being used on another resident in his care facility and now will require new equipment.       Plan:  1. Orders for [employer] nebulizer or like machine and supplies    Pt needing above DME with expected length of need of 99 months due to medical necessity associated with following diagnosis:    (J44.9) Chronic obstructive pulmonary disease, unspecified COPD type (H)    PMH   has a past medical history of Abdominal aortic aneurysm without rupture (2016), Acute CVA (cerebrovascular accident) (H) (3/28/2019), Atrial fibrillation/flutter, Atrial fibrillation/flutter (2019), COPD (chronic obstructive pulmonary disease), Coronary atherosclerosis (2015), and Mixed hyperlipidemia (2010).    ASSESSMENT/PLAN:  1. Orders for [employer] nebulizer or like machine and supplies  Orders:  1. Facility staff/TC to contact DME company to get their order: [facility]     ELECTRONICALLY SIGNED BY SUSHANT CERTIFIED PROVIDER:  EYAD Srivastava CNP   NPI: [id number]  [place] [facility]  [address] [place] [address], [address]  [place] [address]

## 2022-11-28 NOTE — TELEPHONE ENCOUNTER
Prior Authorization Retail Medication Request    Medication/Dose: albuterol/ipratropium 3mg/3ml nebulizer solution  ICD code (if different than what is on RX):  J44.9, J18.9  Previously Tried and Failed:  Albuterol nebulizer  Rationale:  Nebulize one vial twice daily and once daily as needed.      Qty:  270ml/30 days   Prescriber NPI:  [id number]    Insurance Name:  Medicare  Insurance ID:  [id number]     Secondary Insurance Name:  [employer] [place]  Secondary Insurance ID:  [id number]       Pharmacy Information (if different than what is on RX)  Name:  [facility] - [address] [place] [address], [address]    Phone: [phone]

## 2022-11-28 NOTE — TELEPHONE ENCOUNTER
Central Prior Authorization Team - Phone: [phone]     Hi, There seems to be no active Rx on patient's chart for this Prior Authorization request?  Unfortunately, Central PA Team would need an Active Rx on file for this medication before can initiate PA request.  Insurance companies require medication SIG, Qty, ICD10 diagnosis code(s) along with prescribing Provider's NPI number.  Please ask provider to add Rx to file, then can route back to Central Prior Authorization Team or myself, and we will initiate PA request for you.  Thank you.

## 2022-12-30 NOTE — TELEPHONE ENCOUNTER
Central Prior Authorization Team  Phone: [phone]    PA Initiation    Medication: albuterol/ipratropium 3mg/3ml nebulizer solution  Insurance Company: [employer] Part D - Phone [phone] Fax [phone]  Pharmacy Filling the Rx:    Filling Pharmacy Phone:    Filling Pharmacy Fax:    Start Date: 12/30/2022

## 2023-01-01 ENCOUNTER — APPOINTMENT (OUTPATIENT)
Dept: CARDIOLOGY | Facility: CLINIC | Age: 88
DRG: 177 | End: 2023-01-01
Attending: INTERNAL MEDICINE
Payer: MEDICARE

## 2023-01-01 ENCOUNTER — ASSISTED LIVING VISIT (OUTPATIENT)
Dept: GERIATRICS | Facility: CLINIC | Age: 88
End: 2023-01-01
Payer: MEDICARE

## 2023-01-01 ENCOUNTER — APPOINTMENT (OUTPATIENT)
Dept: GENERAL RADIOLOGY | Facility: CLINIC | Age: 88
End: 2023-01-01
Attending: EMERGENCY MEDICINE
Payer: MEDICARE

## 2023-01-01 ENCOUNTER — HOSPITAL ENCOUNTER (EMERGENCY)
Facility: CLINIC | Age: 88
Discharge: HOME | End: 2023-04-17
Attending: EMERGENCY MEDICINE | Admitting: EMERGENCY MEDICINE
Payer: MEDICARE

## 2023-01-01 ENCOUNTER — HOSPITAL ENCOUNTER (OUTPATIENT)
Facility: CLINIC | Age: 88
Setting detail: OBSERVATION
Discharge: HOSPICE | DRG: 177 | End: 2023-06-08
Attending: STUDENT IN AN ORGANIZED HEALTH CARE EDUCATION/TRAINING PROGRAM | Admitting: STUDENT IN AN ORGANIZED HEALTH CARE EDUCATION/TRAINING PROGRAM
Payer: MEDICARE

## 2023-01-01 ENCOUNTER — APPOINTMENT (OUTPATIENT)
Dept: SPEECH THERAPY | Facility: CLINIC | Age: 88
DRG: 177 | End: 2023-01-01
Attending: INTERNAL MEDICINE
Payer: MEDICARE

## 2023-01-01 ENCOUNTER — DOCUMENTATION ONLY (OUTPATIENT)
Dept: OTHER | Facility: CLINIC | Age: 88
End: 2023-01-01
Payer: MEDICARE

## 2023-01-01 ENCOUNTER — HOSPITAL ENCOUNTER (INPATIENT)
Facility: CLINIC | Age: 88
LOS: 4 days | Discharge: NURSING, REHAB OR LONG-TERM CARE | DRG: 177 | End: 2023-06-14
Attending: EMERGENCY MEDICINE | Admitting: INTERNAL MEDICINE
Payer: MEDICARE

## 2023-01-01 ENCOUNTER — LAB REQUISITION (OUTPATIENT)
Dept: LAB | Facility: CLINIC | Age: 88
End: 2023-01-01
Payer: MEDICARE

## 2023-01-01 ENCOUNTER — HEALTH MAINTENANCE LETTER (OUTPATIENT)
Age: 88
End: 2023-01-01

## 2023-01-01 ENCOUNTER — APPOINTMENT (OUTPATIENT)
Dept: GENERAL RADIOLOGY | Facility: CLINIC | Age: 88
DRG: 177 | End: 2023-01-01
Attending: STUDENT IN AN ORGANIZED HEALTH CARE EDUCATION/TRAINING PROGRAM
Payer: MEDICARE

## 2023-01-01 ENCOUNTER — APPOINTMENT (OUTPATIENT)
Dept: GENERAL RADIOLOGY | Facility: CLINIC | Age: 88
DRG: 177 | End: 2023-01-01
Attending: EMERGENCY MEDICINE
Payer: MEDICARE

## 2023-01-01 VITALS
TEMPERATURE: 98.2 F | BODY MASS INDEX: 18.73 KG/M2 | RESPIRATION RATE: 20 BRPM | SYSTOLIC BLOOD PRESSURE: 137 MMHG | DIASTOLIC BLOOD PRESSURE: 90 MMHG | HEART RATE: 84 BPM | HEIGHT: 73 IN | WEIGHT: 141.3 LBS | OXYGEN SATURATION: 92 %

## 2023-01-01 VITALS
DIASTOLIC BLOOD PRESSURE: 74 MMHG | BODY MASS INDEX: 21.7 KG/M2 | TEMPERATURE: 97.8 F | HEART RATE: 82 BPM | SYSTOLIC BLOOD PRESSURE: 115 MMHG | WEIGHT: 155 LBS | HEIGHT: 71 IN | RESPIRATION RATE: 20 BRPM

## 2023-01-01 VITALS
HEIGHT: 73 IN | TEMPERATURE: 100 F | WEIGHT: 160 LBS | DIASTOLIC BLOOD PRESSURE: 84 MMHG | SYSTOLIC BLOOD PRESSURE: 128 MMHG | HEART RATE: 104 BPM | BODY MASS INDEX: 21.2 KG/M2 | RESPIRATION RATE: 16 BRPM | OXYGEN SATURATION: 96 %

## 2023-01-01 VITALS
SYSTOLIC BLOOD PRESSURE: 128 MMHG | DIASTOLIC BLOOD PRESSURE: 79 MMHG | RESPIRATION RATE: 20 BRPM | TEMPERATURE: 98 F | HEART RATE: 88 BPM | OXYGEN SATURATION: 92 % | BODY MASS INDEX: 21.11 KG/M2 | WEIGHT: 160 LBS

## 2023-01-01 VITALS
DIASTOLIC BLOOD PRESSURE: 72 MMHG | OXYGEN SATURATION: 91 % | TEMPERATURE: 97.4 F | RESPIRATION RATE: 18 BRPM | SYSTOLIC BLOOD PRESSURE: 119 MMHG | HEART RATE: 83 BPM

## 2023-01-01 VITALS
RESPIRATION RATE: 20 BRPM | DIASTOLIC BLOOD PRESSURE: 94 MMHG | TEMPERATURE: 97.7 F | SYSTOLIC BLOOD PRESSURE: 133 MMHG | HEART RATE: 104 BPM | OXYGEN SATURATION: 98 %

## 2023-01-01 VITALS — RESPIRATION RATE: 24 BRPM | HEART RATE: 90 BPM

## 2023-01-01 VITALS
DIASTOLIC BLOOD PRESSURE: 78 MMHG | RESPIRATION RATE: 16 BRPM | SYSTOLIC BLOOD PRESSURE: 125 MMHG | OXYGEN SATURATION: 96 % | HEART RATE: 81 BPM

## 2023-01-01 DIAGNOSIS — I48.91 ATRIAL FIBRILLATION, UNSPECIFIED TYPE (H): ICD-10-CM

## 2023-01-01 DIAGNOSIS — I27.20 PULMONARY HYPERTENSION (H): ICD-10-CM

## 2023-01-01 DIAGNOSIS — J18.9 PNEUMONIA DUE TO INFECTIOUS ORGANISM, UNSPECIFIED LATERALITY, UNSPECIFIED PART OF LUNG: ICD-10-CM

## 2023-01-01 DIAGNOSIS — R05.9 COUGH, UNSPECIFIED TYPE: Primary | ICD-10-CM

## 2023-01-01 DIAGNOSIS — F03.90 SENILE DEMENTIA (H): ICD-10-CM

## 2023-01-01 DIAGNOSIS — L02.215 ABSCESS OF PERINEUM: Primary | ICD-10-CM

## 2023-01-01 DIAGNOSIS — R41.0 DELIRIUM: Primary | ICD-10-CM

## 2023-01-01 DIAGNOSIS — J44.1 COPD EXACERBATION (H): ICD-10-CM

## 2023-01-01 DIAGNOSIS — R13.10 DYSPHAGIA, UNSPECIFIED TYPE: ICD-10-CM

## 2023-01-01 DIAGNOSIS — J44.9 CHRONIC OBSTRUCTIVE PULMONARY DISEASE, UNSPECIFIED COPD TYPE (H): ICD-10-CM

## 2023-01-01 DIAGNOSIS — I48.91 ATRIAL FIBRILLATION, UNSPECIFIED TYPE (H): Primary | ICD-10-CM

## 2023-01-01 DIAGNOSIS — I48.91 UNSPECIFIED ATRIAL FIBRILLATION (H): ICD-10-CM

## 2023-01-01 DIAGNOSIS — J18.9 PNEUMONIA OF RIGHT LUNG DUE TO INFECTIOUS ORGANISM, UNSPECIFIED PART OF LUNG: ICD-10-CM

## 2023-01-01 DIAGNOSIS — J18.9 PNEUMONIA OF RIGHT LOWER LOBE DUE TO INFECTIOUS ORGANISM: Primary | ICD-10-CM

## 2023-01-01 DIAGNOSIS — R26.81 UNSTEADY GAIT: ICD-10-CM

## 2023-01-01 LAB
ALBUMIN SERPL BCG-MCNC: 3.3 G/DL (ref 3.5–5.2)
ALBUMIN UR-MCNC: 20 MG/DL
ALBUMIN UR-MCNC: 50 MG/DL
ALLEN'S TEST: YES
ALP SERPL-CCNC: 83 U/L (ref 40–129)
ALT SERPL W P-5'-P-CCNC: 29 U/L (ref 10–50)
ANION GAP SERPL CALCULATED.3IONS-SCNC: 11 MMOL/L (ref 7–15)
ANION GAP SERPL CALCULATED.3IONS-SCNC: 12 MMOL/L (ref 7–15)
ANION GAP SERPL CALCULATED.3IONS-SCNC: 12 MMOL/L (ref 7–15)
ANION GAP SERPL CALCULATED.3IONS-SCNC: 13 MMOL/L (ref 7–15)
ANION GAP SERPL CALCULATED.3IONS-SCNC: 13 MMOL/L (ref 7–15)
ANION GAP SERPL CALCULATED.3IONS-SCNC: 14 MMOL/L (ref 7–15)
ANION GAP SERPL CALCULATED.3IONS-SCNC: 14 MMOL/L (ref 7–15)
ANION GAP SERPL CALCULATED.3IONS-SCNC: 9 MMOL/L (ref 7–15)
APPEARANCE UR: ABNORMAL
APPEARANCE UR: CLEAR
AST SERPL W P-5'-P-CCNC: 32 U/L (ref 10–50)
ATRIAL RATE - MUSE: 256 BPM
BACTERIA BLD CULT: NO GROWTH
BACTERIA BLD CULT: NO GROWTH
BASE EXCESS BLDA CALC-SCNC: 4 MMOL/L (ref -9–1.8)
BASE EXCESS BLDV CALC-SCNC: 4.3 MMOL/L (ref -7.7–1.9)
BASE EXCESS BLDV CALC-SCNC: 4.5 MMOL/L (ref -7.7–1.9)
BASOPHILS # BLD AUTO: 0 10E3/UL (ref 0–0.2)
BASOPHILS NFR BLD AUTO: 0 %
BILIRUB SERPL-MCNC: 1 MG/DL
BILIRUB UR QL STRIP: NEGATIVE
BILIRUB UR QL STRIP: NEGATIVE
BUN SERPL-MCNC: 14.6 MG/DL (ref 8–23)
BUN SERPL-MCNC: 15.1 MG/DL (ref 8–23)
BUN SERPL-MCNC: 16 MG/DL (ref 8–23)
BUN SERPL-MCNC: 17.3 MG/DL (ref 8–23)
BUN SERPL-MCNC: 18 MG/DL (ref 8–23)
BUN SERPL-MCNC: 19.3 MG/DL (ref 8–23)
BUN SERPL-MCNC: 19.5 MG/DL (ref 8–23)
BUN SERPL-MCNC: 22.4 MG/DL (ref 8–23)
CALCIUM SERPL-MCNC: 8.2 MG/DL (ref 8.8–10.2)
CALCIUM SERPL-MCNC: 8.5 MG/DL (ref 8.8–10.2)
CALCIUM SERPL-MCNC: 8.5 MG/DL (ref 8.8–10.2)
CALCIUM SERPL-MCNC: 8.6 MG/DL (ref 8.8–10.2)
CALCIUM SERPL-MCNC: 8.7 MG/DL (ref 8.8–10.2)
CALCIUM SERPL-MCNC: 9 MG/DL (ref 8.8–10.2)
CALCIUM SERPL-MCNC: 9.1 MG/DL (ref 8.8–10.2)
CALCIUM SERPL-MCNC: 9.2 MG/DL (ref 8.8–10.2)
CHLORIDE SERPL-SCNC: 102 MMOL/L (ref 98–107)
CHLORIDE SERPL-SCNC: 102 MMOL/L (ref 98–107)
CHLORIDE SERPL-SCNC: 104 MMOL/L (ref 98–107)
CHLORIDE SERPL-SCNC: 105 MMOL/L (ref 98–107)
CHLORIDE SERPL-SCNC: 106 MMOL/L (ref 98–107)
CHLORIDE SERPL-SCNC: 106 MMOL/L (ref 98–107)
CHLORIDE SERPL-SCNC: 107 MMOL/L (ref 98–107)
CHLORIDE SERPL-SCNC: 108 MMOL/L (ref 98–107)
COLOR UR AUTO: ABNORMAL
COLOR UR AUTO: YELLOW
CREAT SERPL-MCNC: 0.72 MG/DL (ref 0.67–1.17)
CREAT SERPL-MCNC: 0.76 MG/DL (ref 0.67–1.17)
CREAT SERPL-MCNC: 0.8 MG/DL (ref 0.67–1.17)
CREAT SERPL-MCNC: 0.8 MG/DL (ref 0.67–1.17)
CREAT SERPL-MCNC: 0.81 MG/DL (ref 0.67–1.17)
CREAT SERPL-MCNC: 0.84 MG/DL (ref 0.67–1.17)
CREAT SERPL-MCNC: 0.84 MG/DL (ref 0.67–1.17)
CREAT SERPL-MCNC: 0.86 MG/DL (ref 0.67–1.17)
DEPRECATED HCO3 PLAS-SCNC: 21 MMOL/L (ref 22–29)
DEPRECATED HCO3 PLAS-SCNC: 23 MMOL/L (ref 22–29)
DEPRECATED HCO3 PLAS-SCNC: 24 MMOL/L (ref 22–29)
DEPRECATED HCO3 PLAS-SCNC: 25 MMOL/L (ref 22–29)
DEPRECATED HCO3 PLAS-SCNC: 25 MMOL/L (ref 22–29)
DEPRECATED HCO3 PLAS-SCNC: 26 MMOL/L (ref 22–29)
DIASTOLIC BLOOD PRESSURE - MUSE: NORMAL MMHG
EOSINOPHIL # BLD AUTO: 0 10E3/UL (ref 0–0.7)
EOSINOPHIL # BLD AUTO: 0 10E3/UL (ref 0–0.7)
EOSINOPHIL # BLD AUTO: 0.1 10E3/UL (ref 0–0.7)
EOSINOPHIL NFR BLD AUTO: 0 %
EOSINOPHIL NFR BLD AUTO: 0 %
EOSINOPHIL NFR BLD AUTO: 1 %
ERYTHROCYTE [DISTWIDTH] IN BLOOD BY AUTOMATED COUNT: 13.6 % (ref 10–15)
ERYTHROCYTE [DISTWIDTH] IN BLOOD BY AUTOMATED COUNT: 13.7 % (ref 10–15)
ERYTHROCYTE [DISTWIDTH] IN BLOOD BY AUTOMATED COUNT: 13.9 % (ref 10–15)
ERYTHROCYTE [DISTWIDTH] IN BLOOD BY AUTOMATED COUNT: 14.6 % (ref 10–15)
ERYTHROCYTE [DISTWIDTH] IN BLOOD BY AUTOMATED COUNT: 15.1 % (ref 10–15)
FLUAV RNA SPEC QL NAA+PROBE: NEGATIVE
FLUAV RNA SPEC QL NAA+PROBE: NEGATIVE
FLUBV RNA RESP QL NAA+PROBE: NEGATIVE
FLUBV RNA RESP QL NAA+PROBE: NEGATIVE
GFR SERPL CREATININE-BSD FRML MDRD: 84 ML/MIN/1.73M2
GFR SERPL CREATININE-BSD FRML MDRD: 84 ML/MIN/1.73M2
GFR SERPL CREATININE-BSD FRML MDRD: 85 ML/MIN/1.73M2
GFR SERPL CREATININE-BSD FRML MDRD: 86 ML/MIN/1.73M2
GFR SERPL CREATININE-BSD FRML MDRD: 87 ML/MIN/1.73M2
GFR SERPL CREATININE-BSD FRML MDRD: 88 ML/MIN/1.73M2
GLUCOSE SERPL-MCNC: 106 MG/DL (ref 70–99)
GLUCOSE SERPL-MCNC: 111 MG/DL (ref 70–99)
GLUCOSE SERPL-MCNC: 117 MG/DL (ref 70–99)
GLUCOSE SERPL-MCNC: 121 MG/DL (ref 70–99)
GLUCOSE SERPL-MCNC: 144 MG/DL (ref 70–99)
GLUCOSE SERPL-MCNC: 77 MG/DL (ref 70–99)
GLUCOSE SERPL-MCNC: 90 MG/DL (ref 70–99)
GLUCOSE SERPL-MCNC: 90 MG/DL (ref 70–99)
GLUCOSE UR STRIP-MCNC: NEGATIVE MG/DL
GLUCOSE UR STRIP-MCNC: NEGATIVE MG/DL
HCO3 BLD-SCNC: 27 MMOL/L (ref 21–28)
HCO3 BLDV-SCNC: 22 MMOL/L (ref 21–28)
HCO3 BLDV-SCNC: 24 MMOL/L (ref 21–28)
HCO3 BLDV-SCNC: 28 MMOL/L (ref 21–28)
HCO3 BLDV-SCNC: 29 MMOL/L (ref 21–28)
HCT VFR BLD AUTO: 34.9 % (ref 40–53)
HCT VFR BLD AUTO: 36.4 % (ref 40–53)
HCT VFR BLD AUTO: 37.1 % (ref 40–53)
HCT VFR BLD AUTO: 38.6 % (ref 40–53)
HCT VFR BLD AUTO: 39 % (ref 40–53)
HCT VFR BLD AUTO: 39.1 % (ref 40–53)
HCT VFR BLD AUTO: 39.9 % (ref 40–53)
HCT VFR BLD AUTO: 40.3 % (ref 40–53)
HGB BLD-MCNC: 11.6 G/DL (ref 13.3–17.7)
HGB BLD-MCNC: 11.7 G/DL (ref 13.3–17.7)
HGB BLD-MCNC: 11.9 G/DL (ref 13.3–17.7)
HGB BLD-MCNC: 12 G/DL (ref 13.3–17.7)
HGB BLD-MCNC: 12.5 G/DL (ref 13.3–17.7)
HGB BLD-MCNC: 12.6 G/DL (ref 13.3–17.7)
HGB BLD-MCNC: 13 G/DL (ref 13.3–17.7)
HGB BLD-MCNC: 13.1 G/DL (ref 13.3–17.7)
HGB UR QL STRIP: ABNORMAL
HGB UR QL STRIP: ABNORMAL
HOLD SPECIMEN: NORMAL
HYALINE CASTS: 2 /LPF
IMM GRANULOCYTES # BLD: 0.1 10E3/UL
IMM GRANULOCYTES NFR BLD: 0 %
IMM GRANULOCYTES NFR BLD: 1 %
IMM GRANULOCYTES NFR BLD: 1 %
INTERPRETATION ECG - MUSE: NORMAL
KETONES UR STRIP-MCNC: NEGATIVE MG/DL
KETONES UR STRIP-MCNC: NEGATIVE MG/DL
LACTATE BLD-SCNC: 1 MMOL/L
LACTATE BLD-SCNC: 3.5 MMOL/L
LACTATE SERPL-SCNC: 1 MMOL/L (ref 0.7–2)
LACTATE SERPL-SCNC: 1.2 MMOL/L (ref 0.7–2)
LACTATE SERPL-SCNC: 1.9 MMOL/L (ref 0.7–2)
LEUKOCYTE ESTERASE UR QL STRIP: NEGATIVE
LEUKOCYTE ESTERASE UR QL STRIP: NEGATIVE
LVEF ECHO: NORMAL
LYMPHOCYTES # BLD AUTO: 0.8 10E3/UL (ref 0.8–5.3)
LYMPHOCYTES # BLD AUTO: 1.1 10E3/UL (ref 0.8–5.3)
LYMPHOCYTES # BLD AUTO: 2.7 10E3/UL (ref 0.8–5.3)
LYMPHOCYTES NFR BLD AUTO: 11 %
LYMPHOCYTES NFR BLD AUTO: 18 %
LYMPHOCYTES NFR BLD AUTO: 7 %
MAGNESIUM SERPL-MCNC: 2 MG/DL (ref 1.7–2.3)
MAGNESIUM SERPL-MCNC: 2.1 MG/DL (ref 1.7–2.3)
MCH RBC QN AUTO: 30.5 PG (ref 26.5–33)
MCH RBC QN AUTO: 30.6 PG (ref 26.5–33)
MCH RBC QN AUTO: 30.7 PG (ref 26.5–33)
MCH RBC QN AUTO: 30.7 PG (ref 26.5–33)
MCH RBC QN AUTO: 31 PG (ref 26.5–33)
MCH RBC QN AUTO: 31.1 PG (ref 26.5–33)
MCH RBC QN AUTO: 31.3 PG (ref 26.5–33)
MCH RBC QN AUTO: 31.7 PG (ref 26.5–33)
MCHC RBC AUTO-ENTMCNC: 30.7 G/DL (ref 31.5–36.5)
MCHC RBC AUTO-ENTMCNC: 31.9 G/DL (ref 31.5–36.5)
MCHC RBC AUTO-ENTMCNC: 32.1 G/DL (ref 31.5–36.5)
MCHC RBC AUTO-ENTMCNC: 32.3 G/DL (ref 31.5–36.5)
MCHC RBC AUTO-ENTMCNC: 32.3 G/DL (ref 31.5–36.5)
MCHC RBC AUTO-ENTMCNC: 32.4 G/DL (ref 31.5–36.5)
MCHC RBC AUTO-ENTMCNC: 32.8 G/DL (ref 31.5–36.5)
MCHC RBC AUTO-ENTMCNC: 33.5 G/DL (ref 31.5–36.5)
MCV RBC AUTO: 102 FL (ref 78–100)
MCV RBC AUTO: 93 FL (ref 78–100)
MCV RBC AUTO: 95 FL (ref 78–100)
MCV RBC AUTO: 96 FL (ref 78–100)
MCV RBC AUTO: 98 FL (ref 78–100)
MONOCYTES # BLD AUTO: 0.8 10E3/UL (ref 0–1.3)
MONOCYTES # BLD AUTO: 0.8 10E3/UL (ref 0–1.3)
MONOCYTES # BLD AUTO: 1.2 10E3/UL (ref 0–1.3)
MONOCYTES NFR BLD AUTO: 7 %
MONOCYTES NFR BLD AUTO: 8 %
MONOCYTES NFR BLD AUTO: 8 %
MUCOUS THREADS #/AREA URNS LPF: PRESENT /LPF
MUCOUS THREADS #/AREA URNS LPF: PRESENT /LPF
NEUTROPHILS # BLD AUTO: 11 10E3/UL (ref 1.6–8.3)
NEUTROPHILS # BLD AUTO: 8 10E3/UL (ref 1.6–8.3)
NEUTROPHILS # BLD AUTO: 9.6 10E3/UL (ref 1.6–8.3)
NEUTROPHILS NFR BLD AUTO: 74 %
NEUTROPHILS NFR BLD AUTO: 79 %
NEUTROPHILS NFR BLD AUTO: 85 %
NITRATE UR QL: NEGATIVE
NITRATE UR QL: NEGATIVE
NRBC # BLD AUTO: 0 10E3/UL
NRBC BLD AUTO-RTO: 0 /100
O2/TOTAL GAS SETTING VFR VENT: 21 %
O2/TOTAL GAS SETTING VFR VENT: 30 %
O2/TOTAL GAS SETTING VFR VENT: 4 %
OXYHGB MFR BLD: 86 % (ref 92–100)
P AXIS - MUSE: NORMAL DEGREES
PCO2 BLD: 36 MM HG (ref 35–45)
PCO2 BLDV: 29 MM HG (ref 40–50)
PCO2 BLDV: 37 MM HG (ref 40–50)
PCO2 BLDV: 41 MM HG (ref 40–50)
PCO2 BLDV: 43 MM HG (ref 40–50)
PH BLD: 7.49 [PH] (ref 7.35–7.45)
PH BLDV: 7.38 [PH] (ref 7.32–7.43)
PH BLDV: 7.44 [PH] (ref 7.32–7.43)
PH BLDV: 7.48 [PH] (ref 7.32–7.43)
PH BLDV: 7.49 [PH] (ref 7.32–7.43)
PH UR STRIP: 5.5 [PH] (ref 5–7)
PH UR STRIP: 7 [PH] (ref 5–7)
PHOSPHATE SERPL-MCNC: 3.6 MG/DL (ref 2.5–4.5)
PHOSPHATE SERPL-MCNC: 3.9 MG/DL (ref 2.5–4.5)
PLATELET # BLD AUTO: 143 10E3/UL (ref 150–450)
PLATELET # BLD AUTO: 151 10E3/UL (ref 150–450)
PLATELET # BLD AUTO: 152 10E3/UL (ref 150–450)
PLATELET # BLD AUTO: 153 10E3/UL (ref 150–450)
PLATELET # BLD AUTO: 173 10E3/UL (ref 150–450)
PLATELET # BLD AUTO: 185 10E3/UL (ref 150–450)
PLATELET # BLD AUTO: 186 10E3/UL (ref 150–450)
PLATELET # BLD AUTO: 247 10E3/UL (ref 150–450)
PO2 BLD: 52 MM HG (ref 80–105)
PO2 BLDV: 30 MM HG (ref 25–47)
PO2 BLDV: 51 MM HG (ref 25–47)
PO2 BLDV: 52 MM HG (ref 25–47)
PO2 BLDV: 57 MM HG (ref 25–47)
POTASSIUM SERPL-SCNC: 3.5 MMOL/L (ref 3.4–5.3)
POTASSIUM SERPL-SCNC: 3.7 MMOL/L (ref 3.4–5.3)
POTASSIUM SERPL-SCNC: 3.8 MMOL/L (ref 3.4–5.3)
POTASSIUM SERPL-SCNC: 4 MMOL/L (ref 3.4–5.3)
POTASSIUM SERPL-SCNC: 4.3 MMOL/L (ref 3.4–5.3)
POTASSIUM SERPL-SCNC: 4.8 MMOL/L (ref 3.4–5.3)
PR INTERVAL - MUSE: NORMAL MS
PROT SERPL-MCNC: 6.3 G/DL (ref 6.4–8.3)
QRS DURATION - MUSE: 92 MS
QT - MUSE: 358 MS
QTC - MUSE: 442 MS
R AXIS - MUSE: 39 DEGREES
RBC # BLD AUTO: 3.77 10E6/UL (ref 4.4–5.9)
RBC # BLD AUTO: 3.8 10E6/UL (ref 4.4–5.9)
RBC # BLD AUTO: 3.83 10E6/UL (ref 4.4–5.9)
RBC # BLD AUTO: 3.89 10E6/UL (ref 4.4–5.9)
RBC # BLD AUTO: 4.07 10E6/UL (ref 4.4–5.9)
RBC # BLD AUTO: 4.1 10E6/UL (ref 4.4–5.9)
RBC # BLD AUTO: 4.11 10E6/UL (ref 4.4–5.9)
RBC # BLD AUTO: 4.21 10E6/UL (ref 4.4–5.9)
RBC URINE: 52 /HPF
RBC URINE: >182 /HPF
RSV RNA SPEC NAA+PROBE: NEGATIVE
RSV RNA SPEC NAA+PROBE: NEGATIVE
SAO2 % BLDV: 55 % (ref 94–100)
SAO2 % BLDV: 90 % (ref 94–100)
SARS-COV-2 RNA RESP QL NAA+PROBE: NEGATIVE
SARS-COV-2 RNA RESP QL NAA+PROBE: NEGATIVE
SODIUM SERPL-SCNC: 136 MMOL/L (ref 136–145)
SODIUM SERPL-SCNC: 138 MMOL/L (ref 136–145)
SODIUM SERPL-SCNC: 139 MMOL/L (ref 136–145)
SODIUM SERPL-SCNC: 140 MMOL/L (ref 136–145)
SODIUM SERPL-SCNC: 142 MMOL/L (ref 136–145)
SODIUM SERPL-SCNC: 142 MMOL/L (ref 136–145)
SODIUM SERPL-SCNC: 143 MMOL/L (ref 136–145)
SODIUM SERPL-SCNC: 144 MMOL/L (ref 136–145)
SP GR UR STRIP: 1.02 (ref 1–1.03)
SP GR UR STRIP: 1.03 (ref 1–1.03)
SQUAMOUS EPITHELIAL: <1 /HPF
SQUAMOUS EPITHELIAL: <1 /HPF
SYSTOLIC BLOOD PRESSURE - MUSE: NORMAL MMHG
T AXIS - MUSE: -7 DEGREES
TROPONIN T SERPL HS-MCNC: 21 NG/L
UROBILINOGEN UR STRIP-MCNC: 8 MG/DL
UROBILINOGEN UR STRIP-MCNC: NORMAL MG/DL
VENTRICULAR RATE- MUSE: 92 BPM
WBC # BLD AUTO: 10 10E3/UL (ref 4–11)
WBC # BLD AUTO: 11.3 10E3/UL (ref 4–11)
WBC # BLD AUTO: 15 10E3/UL (ref 4–11)
WBC # BLD AUTO: 8.1 10E3/UL (ref 4–11)
WBC # BLD AUTO: 8.2 10E3/UL (ref 4–11)
WBC # BLD AUTO: 8.5 10E3/UL (ref 4–11)
WBC # BLD AUTO: 8.9 10E3/UL (ref 4–11)
WBC # BLD AUTO: 9.2 10E3/UL (ref 4–11)
WBC URINE: 1 /HPF
WBC URINE: 2 /HPF

## 2023-01-01 PROCEDURE — 96366 THER/PROPH/DIAG IV INF ADDON: CPT

## 2023-01-01 PROCEDURE — 82803 BLOOD GASES ANY COMBINATION: CPT | Performed by: STUDENT IN AN ORGANIZED HEALTH CARE EDUCATION/TRAINING PROGRAM

## 2023-01-01 PROCEDURE — 250N000011 HC RX IP 250 OP 636: Performed by: HOSPITALIST

## 2023-01-01 PROCEDURE — 92610 EVALUATE SWALLOWING FUNCTION: CPT | Mod: GN | Performed by: SPEECH-LANGUAGE PATHOLOGIST

## 2023-01-01 PROCEDURE — 83605 ASSAY OF LACTIC ACID: CPT

## 2023-01-01 PROCEDURE — 36415 COLL VENOUS BLD VENIPUNCTURE: CPT | Performed by: INTERNAL MEDICINE

## 2023-01-01 PROCEDURE — 250N000011 HC RX IP 250 OP 636: Performed by: PHYSICIAN ASSISTANT

## 2023-01-01 PROCEDURE — 99232 SBSQ HOSP IP/OBS MODERATE 35: CPT | Performed by: INTERNAL MEDICINE

## 2023-01-01 PROCEDURE — 85027 COMPLETE CBC AUTOMATED: CPT | Performed by: INTERNAL MEDICINE

## 2023-01-01 PROCEDURE — 250N000009 HC RX 250: Performed by: PHYSICIAN ASSISTANT

## 2023-01-01 PROCEDURE — 36415 COLL VENOUS BLD VENIPUNCTURE: CPT | Performed by: STUDENT IN AN ORGANIZED HEALTH CARE EDUCATION/TRAINING PROGRAM

## 2023-01-01 PROCEDURE — 250N000011 HC RX IP 250 OP 636: Performed by: INTERNAL MEDICINE

## 2023-01-01 PROCEDURE — 120N000001 HC R&B MED SURG/OB

## 2023-01-01 PROCEDURE — 96365 THER/PROPH/DIAG IV INF INIT: CPT

## 2023-01-01 PROCEDURE — 87040 BLOOD CULTURE FOR BACTERIA: CPT | Performed by: EMERGENCY MEDICINE

## 2023-01-01 PROCEDURE — 71046 X-RAY EXAM CHEST 2 VIEWS: CPT

## 2023-01-01 PROCEDURE — 120N000013 HC R&B IMCU

## 2023-01-01 PROCEDURE — 99349 HOME/RES VST EST MOD MDM 40: CPT | Performed by: INTERNAL MEDICINE

## 2023-01-01 PROCEDURE — 96375 TX/PRO/DX INJ NEW DRUG ADDON: CPT

## 2023-01-01 PROCEDURE — 80053 COMPREHEN METABOLIC PANEL: CPT | Performed by: STUDENT IN AN ORGANIZED HEALTH CARE EDUCATION/TRAINING PROGRAM

## 2023-01-01 PROCEDURE — 83605 ASSAY OF LACTIC ACID: CPT | Performed by: STUDENT IN AN ORGANIZED HEALTH CARE EDUCATION/TRAINING PROGRAM

## 2023-01-01 PROCEDURE — 80048 BASIC METABOLIC PNL TOTAL CA: CPT | Performed by: INTERNAL MEDICINE

## 2023-01-01 PROCEDURE — 83735 ASSAY OF MAGNESIUM: CPT | Performed by: STUDENT IN AN ORGANIZED HEALTH CARE EDUCATION/TRAINING PROGRAM

## 2023-01-01 PROCEDURE — 82803 BLOOD GASES ANY COMBINATION: CPT

## 2023-01-01 PROCEDURE — 99285 EMERGENCY DEPT VISIT HI MDM: CPT | Mod: CS,25

## 2023-01-01 PROCEDURE — 87637 SARSCOV2&INF A&B&RSV AMP PRB: CPT | Performed by: EMERGENCY MEDICINE

## 2023-01-01 PROCEDURE — 36415 COLL VENOUS BLD VENIPUNCTURE: CPT | Performed by: EMERGENCY MEDICINE

## 2023-01-01 PROCEDURE — 96367 TX/PROPH/DG ADDL SEQ IV INF: CPT

## 2023-01-01 PROCEDURE — 99207 PR NO BILLABLE SERVICE THIS VISIT: CPT | Performed by: INTERNAL MEDICINE

## 2023-01-01 PROCEDURE — 87637 SARSCOV2&INF A&B&RSV AMP PRB: CPT | Performed by: STUDENT IN AN ORGANIZED HEALTH CARE EDUCATION/TRAINING PROGRAM

## 2023-01-01 PROCEDURE — 250N000013 HC RX MED GY IP 250 OP 250 PS 637: Performed by: INTERNAL MEDICINE

## 2023-01-01 PROCEDURE — 250N000013 HC RX MED GY IP 250 OP 250 PS 637: Performed by: PHYSICIAN ASSISTANT

## 2023-01-01 PROCEDURE — 83605 ASSAY OF LACTIC ACID: CPT | Performed by: EMERGENCY MEDICINE

## 2023-01-01 PROCEDURE — 94660 CPAP INITIATION&MGMT: CPT

## 2023-01-01 PROCEDURE — 96361 HYDRATE IV INFUSION ADD-ON: CPT

## 2023-01-01 PROCEDURE — G0378 HOSPITAL OBSERVATION PER HR: HCPCS

## 2023-01-01 PROCEDURE — 99223 1ST HOSP IP/OBS HIGH 75: CPT | Mod: AI | Performed by: PHYSICIAN ASSISTANT

## 2023-01-01 PROCEDURE — 36415 COLL VENOUS BLD VENIPUNCTURE: CPT | Mod: ORL | Performed by: NURSE PRACTITIONER

## 2023-01-01 PROCEDURE — 96374 THER/PROPH/DIAG INJ IV PUSH: CPT

## 2023-01-01 PROCEDURE — 80048 BASIC METABOLIC PNL TOTAL CA: CPT | Performed by: EMERGENCY MEDICINE

## 2023-01-01 PROCEDURE — 999N000157 HC STATISTIC RCP TIME EA 10 MIN

## 2023-01-01 PROCEDURE — 258N000003 HC RX IP 258 OP 636: Performed by: EMERGENCY MEDICINE

## 2023-01-01 PROCEDURE — 99232 SBSQ HOSP IP/OBS MODERATE 35: CPT | Performed by: NURSE PRACTITIONER

## 2023-01-01 PROCEDURE — 94640 AIRWAY INHALATION TREATMENT: CPT

## 2023-01-01 PROCEDURE — 99349 HOME/RES VST EST MOD MDM 40: CPT | Performed by: NURSE PRACTITIONER

## 2023-01-01 PROCEDURE — 255N000002 HC RX 255 OP 636: Performed by: INTERNAL MEDICINE

## 2023-01-01 PROCEDURE — 258N000003 HC RX IP 258 OP 636: Performed by: STUDENT IN AN ORGANIZED HEALTH CARE EDUCATION/TRAINING PROGRAM

## 2023-01-01 PROCEDURE — 94640 AIRWAY INHALATION TREATMENT: CPT | Mod: 76

## 2023-01-01 PROCEDURE — 250N000011 HC RX IP 250 OP 636: Performed by: NURSE PRACTITIONER

## 2023-01-01 PROCEDURE — 85027 COMPLETE CBC AUTOMATED: CPT | Performed by: PHYSICIAN ASSISTANT

## 2023-01-01 PROCEDURE — 93005 ELECTROCARDIOGRAM TRACING: CPT

## 2023-01-01 PROCEDURE — 250N000009 HC RX 250: Performed by: INTERNAL MEDICINE

## 2023-01-01 PROCEDURE — 84100 ASSAY OF PHOSPHORUS: CPT | Performed by: STUDENT IN AN ORGANIZED HEALTH CARE EDUCATION/TRAINING PROGRAM

## 2023-01-01 PROCEDURE — 250N000011 HC RX IP 250 OP 636: Performed by: EMERGENCY MEDICINE

## 2023-01-01 PROCEDURE — 96376 TX/PRO/DX INJ SAME DRUG ADON: CPT

## 2023-01-01 PROCEDURE — 99239 HOSP IP/OBS DSCHRG MGMT >30: CPT | Performed by: HOSPITALIST

## 2023-01-01 PROCEDURE — 5A09357 ASSISTANCE WITH RESPIRATORY VENTILATION, LESS THAN 24 CONSECUTIVE HOURS, CONTINUOUS POSITIVE AIRWAY PRESSURE: ICD-10-PCS | Performed by: PHYSICIAN ASSISTANT

## 2023-01-01 PROCEDURE — 250N000012 HC RX MED GY IP 250 OP 636 PS 637: Performed by: STUDENT IN AN ORGANIZED HEALTH CARE EDUCATION/TRAINING PROGRAM

## 2023-01-01 PROCEDURE — 82805 BLOOD GASES W/O2 SATURATION: CPT | Performed by: INTERNAL MEDICINE

## 2023-01-01 PROCEDURE — 99285 EMERGENCY DEPT VISIT HI MDM: CPT | Mod: 25

## 2023-01-01 PROCEDURE — 85025 COMPLETE CBC W/AUTO DIFF WBC: CPT | Performed by: EMERGENCY MEDICINE

## 2023-01-01 PROCEDURE — 250N000013 HC RX MED GY IP 250 OP 250 PS 637: Performed by: STUDENT IN AN ORGANIZED HEALTH CARE EDUCATION/TRAINING PROGRAM

## 2023-01-01 PROCEDURE — 250N000013 HC RX MED GY IP 250 OP 250 PS 637: Performed by: EMERGENCY MEDICINE

## 2023-01-01 PROCEDURE — 250N000012 HC RX MED GY IP 250 OP 636 PS 637: Performed by: INTERNAL MEDICINE

## 2023-01-01 PROCEDURE — 250N000013 HC RX MED GY IP 250 OP 250 PS 637: Performed by: NURSE PRACTITIONER

## 2023-01-01 PROCEDURE — 99233 SBSQ HOSP IP/OBS HIGH 50: CPT | Performed by: INTERNAL MEDICINE

## 2023-01-01 PROCEDURE — 81001 URINALYSIS AUTO W/SCOPE: CPT | Performed by: EMERGENCY MEDICINE

## 2023-01-01 PROCEDURE — 99223 1ST HOSP IP/OBS HIGH 75: CPT | Performed by: NURSE PRACTITIONER

## 2023-01-01 PROCEDURE — 82310 ASSAY OF CALCIUM: CPT | Performed by: INTERNAL MEDICINE

## 2023-01-01 PROCEDURE — 80048 BASIC METABOLIC PNL TOTAL CA: CPT | Performed by: PHYSICIAN ASSISTANT

## 2023-01-01 PROCEDURE — 250N000011 HC RX IP 250 OP 636: Performed by: STUDENT IN AN ORGANIZED HEALTH CARE EDUCATION/TRAINING PROGRAM

## 2023-01-01 PROCEDURE — 99223 1ST HOSP IP/OBS HIGH 75: CPT | Mod: AI | Performed by: INTERNAL MEDICINE

## 2023-01-01 PROCEDURE — 85027 COMPLETE CBC AUTOMATED: CPT | Mod: ORL | Performed by: NURSE PRACTITIONER

## 2023-01-01 PROCEDURE — 999N000147 HC STATISTIC PT IP EVAL DEFER

## 2023-01-01 PROCEDURE — 999N000156 HC STATISTIC RCP CONSULT EA 30 MIN

## 2023-01-01 PROCEDURE — 250N000009 HC RX 250: Performed by: NURSE PRACTITIONER

## 2023-01-01 PROCEDURE — 84484 ASSAY OF TROPONIN QUANT: CPT | Performed by: EMERGENCY MEDICINE

## 2023-01-01 PROCEDURE — 81001 URINALYSIS AUTO W/SCOPE: CPT | Performed by: INTERNAL MEDICINE

## 2023-01-01 PROCEDURE — 80048 BASIC METABOLIC PNL TOTAL CA: CPT | Mod: ORL | Performed by: NURSE PRACTITIONER

## 2023-01-01 PROCEDURE — 36415 COLL VENOUS BLD VENIPUNCTURE: CPT | Performed by: PHYSICIAN ASSISTANT

## 2023-01-01 PROCEDURE — 85027 COMPLETE CBC AUTOMATED: CPT | Performed by: EMERGENCY MEDICINE

## 2023-01-01 PROCEDURE — 99349 HOME/RES VST EST MOD MDM 40: CPT | Mod: GV | Performed by: NURSE PRACTITIONER

## 2023-01-01 PROCEDURE — P9604 ONE-WAY ALLOW PRORATED TRIP: HCPCS | Mod: ORL | Performed by: NURSE PRACTITIONER

## 2023-01-01 PROCEDURE — 36600 WITHDRAWAL OF ARTERIAL BLOOD: CPT

## 2023-01-01 PROCEDURE — 999N000208 ECHOCARDIOGRAM COMPLETE

## 2023-01-01 PROCEDURE — 85025 COMPLETE CBC W/AUTO DIFF WBC: CPT | Performed by: STUDENT IN AN ORGANIZED HEALTH CARE EDUCATION/TRAINING PROGRAM

## 2023-01-01 PROCEDURE — 258N000003 HC RX IP 258 OP 636: Performed by: PHYSICIAN ASSISTANT

## 2023-01-01 PROCEDURE — C9803 HOPD COVID-19 SPEC COLLECT: HCPCS

## 2023-01-01 PROCEDURE — 93306 TTE W/DOPPLER COMPLETE: CPT | Mod: 26 | Performed by: INTERNAL MEDICINE

## 2023-01-01 RX ORDER — SODIUM CHLORIDE 9 MG/ML
INJECTION, SOLUTION INTRAVENOUS CONTINUOUS
Status: DISCONTINUED | OUTPATIENT
Start: 2023-01-01 | End: 2023-01-01

## 2023-01-01 RX ORDER — GUAIFENESIN 600 MG/1
600 TABLET, EXTENDED RELEASE ORAL 2 TIMES DAILY
Status: DISCONTINUED | OUTPATIENT
Start: 2023-01-01 | End: 2023-01-01

## 2023-01-01 RX ORDER — QUETIAPINE FUMARATE 25 MG/1
25 TABLET, FILM COATED ORAL AT BEDTIME
DISCHARGE
Start: 2023-01-01 | End: 2023-01-01

## 2023-01-01 RX ORDER — IPRATROPIUM BROMIDE AND ALBUTEROL SULFATE 2.5; .5 MG/3ML; MG/3ML
3 SOLUTION RESPIRATORY (INHALATION)
Status: DISCONTINUED | OUTPATIENT
Start: 2023-01-01 | End: 2023-01-01

## 2023-01-01 RX ORDER — PREDNISONE 20 MG/1
40 TABLET ORAL DAILY
Status: DISCONTINUED | OUTPATIENT
Start: 2023-01-01 | End: 2023-01-01 | Stop reason: HOSPADM

## 2023-01-01 RX ORDER — MORPHINE SULFATE 10 MG/5ML
10 SOLUTION ORAL
Status: DISCONTINUED | OUTPATIENT
Start: 2023-01-01 | End: 2023-01-01 | Stop reason: HOSPADM

## 2023-01-01 RX ORDER — AZITHROMYCIN 250 MG/1
250 TABLET, FILM COATED ORAL DAILY
Status: DISCONTINUED | OUTPATIENT
Start: 2023-01-01 | End: 2023-01-01

## 2023-01-01 RX ORDER — HALOPERIDOL 0.5 MG/1
.5-1 TABLET ORAL
Status: DISCONTINUED | OUTPATIENT
Start: 2023-01-01 | End: 2023-01-01 | Stop reason: HOSPADM

## 2023-01-01 RX ORDER — ALBUTEROL SULFATE 90 UG/1
2 AEROSOL, METERED RESPIRATORY (INHALATION) ONCE
Status: COMPLETED | OUTPATIENT
Start: 2023-01-01 | End: 2023-01-01

## 2023-01-01 RX ORDER — LORAZEPAM 2 MG/ML
0.5 INJECTION INTRAMUSCULAR ONCE
Status: COMPLETED | OUTPATIENT
Start: 2023-01-01 | End: 2023-01-01

## 2023-01-01 RX ORDER — IPRATROPIUM BROMIDE AND ALBUTEROL SULFATE 2.5; .5 MG/3ML; MG/3ML
1 SOLUTION RESPIRATORY (INHALATION) 3 TIMES DAILY PRN
Qty: 90 ML | Refills: 11 | Status: SHIPPED | OUTPATIENT
Start: 2023-01-01 | End: 2023-01-01

## 2023-01-01 RX ORDER — PREDNISONE 20 MG/1
40 TABLET ORAL ONCE
Status: COMPLETED | OUTPATIENT
Start: 2023-01-01 | End: 2023-01-01

## 2023-01-01 RX ORDER — HALOPERIDOL 1 MG/1
1 TABLET ORAL 3 TIMES DAILY
COMMUNITY

## 2023-01-01 RX ORDER — ONDANSETRON 2 MG/ML
4 INJECTION INTRAMUSCULAR; INTRAVENOUS EVERY 6 HOURS PRN
Status: DISCONTINUED | OUTPATIENT
Start: 2023-01-01 | End: 2023-01-01 | Stop reason: HOSPADM

## 2023-01-01 RX ORDER — MORPHINE SULFATE 30 MG/1
10 TABLET ORAL EVERY 4 HOURS
COMMUNITY

## 2023-01-01 RX ORDER — CARBOXYMETHYLCELLULOSE SODIUM 5 MG/ML
1 SOLUTION/ DROPS OPHTHALMIC
Status: DISCONTINUED | OUTPATIENT
Start: 2023-01-01 | End: 2023-01-01 | Stop reason: HOSPADM

## 2023-01-01 RX ORDER — ACETAMINOPHEN 500 MG
1000 TABLET ORAL ONCE
Status: COMPLETED | OUTPATIENT
Start: 2023-01-01 | End: 2023-01-01

## 2023-01-01 RX ORDER — NITROGLYCERIN 0.4 MG/1
0.4 TABLET SUBLINGUAL EVERY 5 MIN PRN
Status: DISCONTINUED | OUTPATIENT
Start: 2023-01-01 | End: 2023-01-01 | Stop reason: HOSPADM

## 2023-01-01 RX ORDER — MORPHINE SULFATE 20 MG/ML
10 SOLUTION ORAL
Status: DISCONTINUED | OUTPATIENT
Start: 2023-01-01 | End: 2023-01-01 | Stop reason: HOSPADM

## 2023-01-01 RX ORDER — LIDOCAINE 40 MG/G
CREAM TOPICAL
Status: DISCONTINUED | OUTPATIENT
Start: 2023-01-01 | End: 2023-01-01 | Stop reason: HOSPADM

## 2023-01-01 RX ORDER — OLANZAPINE 5 MG/1
5 TABLET, ORALLY DISINTEGRATING ORAL EVERY 6 HOURS PRN
Status: DISCONTINUED | OUTPATIENT
Start: 2023-01-01 | End: 2023-01-01

## 2023-01-01 RX ORDER — GUAIFENESIN 600 MG/1
600 TABLET, EXTENDED RELEASE ORAL 2 TIMES DAILY PRN
Status: DISCONTINUED | OUTPATIENT
Start: 2023-01-01 | End: 2023-01-01 | Stop reason: HOSPADM

## 2023-01-01 RX ORDER — ACETAMINOPHEN 325 MG/1
650 TABLET ORAL EVERY 6 HOURS PRN
Status: DISCONTINUED | OUTPATIENT
Start: 2023-01-01 | End: 2023-01-01

## 2023-01-01 RX ORDER — NALOXONE HYDROCHLORIDE 0.4 MG/ML
0.2 INJECTION, SOLUTION INTRAMUSCULAR; INTRAVENOUS; SUBCUTANEOUS
Status: DISCONTINUED | OUTPATIENT
Start: 2023-01-01 | End: 2023-01-01 | Stop reason: HOSPADM

## 2023-01-01 RX ORDER — HALOPERIDOL 5 MG/ML
2 INJECTION INTRAMUSCULAR
Status: DISCONTINUED | OUTPATIENT
Start: 2023-01-01 | End: 2023-01-01

## 2023-01-01 RX ORDER — LORAZEPAM 1 MG/1
1-2 TABLET ORAL
Status: DISCONTINUED | OUTPATIENT
Start: 2023-01-01 | End: 2023-01-01 | Stop reason: HOSPADM

## 2023-01-01 RX ORDER — CEFTRIAXONE 1 G/1
1 INJECTION, POWDER, FOR SOLUTION INTRAMUSCULAR; INTRAVENOUS EVERY 24 HOURS
Status: DISCONTINUED | OUTPATIENT
Start: 2023-01-01 | End: 2023-01-01

## 2023-01-01 RX ORDER — AMPICILLIN AND SULBACTAM 2; 1 G/1; G/1
3 INJECTION, POWDER, FOR SOLUTION INTRAMUSCULAR; INTRAVENOUS EVERY 6 HOURS
Status: COMPLETED | OUTPATIENT
Start: 2023-01-01 | End: 2023-01-01

## 2023-01-01 RX ORDER — AZITHROMYCIN 250 MG/1
250 TABLET, FILM COATED ORAL DAILY
Status: DISCONTINUED | OUTPATIENT
Start: 2023-01-01 | End: 2023-01-01 | Stop reason: HOSPADM

## 2023-01-01 RX ORDER — AZITHROMYCIN 500 MG/5ML
500 INJECTION, POWDER, LYOPHILIZED, FOR SOLUTION INTRAVENOUS ONCE
Status: COMPLETED | OUTPATIENT
Start: 2023-01-01 | End: 2023-01-01

## 2023-01-01 RX ORDER — AZITHROMYCIN 250 MG/1
500 TABLET, FILM COATED ORAL ONCE
Status: DISCONTINUED | OUTPATIENT
Start: 2023-01-01 | End: 2023-01-01

## 2023-01-01 RX ORDER — DONEPEZIL HYDROCHLORIDE 5 MG/1
5 TABLET, FILM COATED ORAL AT BEDTIME
Status: DISCONTINUED | OUTPATIENT
Start: 2023-01-01 | End: 2023-01-01 | Stop reason: HOSPADM

## 2023-01-01 RX ORDER — LORAZEPAM 1 MG/1
1-2 TABLET ORAL
Status: DISCONTINUED | OUTPATIENT
Start: 2023-01-01 | End: 2023-01-01

## 2023-01-01 RX ORDER — ATORVASTATIN CALCIUM 40 MG/1
40 TABLET, FILM COATED ORAL AT BEDTIME
Status: DISCONTINUED | OUTPATIENT
Start: 2023-01-01 | End: 2023-01-01

## 2023-01-01 RX ORDER — ACETAMINOPHEN 650 MG/1
650 SUPPOSITORY RECTAL EVERY 6 HOURS PRN
Status: DISCONTINUED | OUTPATIENT
Start: 2023-01-01 | End: 2023-01-01

## 2023-01-01 RX ORDER — AMOXICILLIN 250 MG
1 CAPSULE ORAL 2 TIMES DAILY
Status: DISCONTINUED | OUTPATIENT
Start: 2023-01-01 | End: 2023-01-01 | Stop reason: HOSPADM

## 2023-01-01 RX ORDER — CEFTRIAXONE 1 G/1
1 INJECTION, POWDER, FOR SOLUTION INTRAMUSCULAR; INTRAVENOUS ONCE
Status: COMPLETED | OUTPATIENT
Start: 2023-01-01 | End: 2023-01-01

## 2023-01-01 RX ORDER — LORAZEPAM 2 MG/ML
1-2 INJECTION INTRAMUSCULAR
Status: DISCONTINUED | OUTPATIENT
Start: 2023-01-01 | End: 2023-01-01 | Stop reason: HOSPADM

## 2023-01-01 RX ORDER — AMOXICILLIN 250 MG
2 CAPSULE ORAL 2 TIMES DAILY
Status: DISCONTINUED | OUTPATIENT
Start: 2023-01-01 | End: 2023-01-01 | Stop reason: HOSPADM

## 2023-01-01 RX ORDER — METOPROLOL TARTRATE 25 MG/1
25 TABLET, FILM COATED ORAL 2 TIMES DAILY
Status: DISCONTINUED | OUTPATIENT
Start: 2023-01-01 | End: 2023-01-01 | Stop reason: HOSPADM

## 2023-01-01 RX ORDER — METOPROLOL TARTRATE 25 MG/1
25 TABLET, FILM COATED ORAL 2 TIMES DAILY
Qty: 60 TABLET | Refills: 0 | Status: SHIPPED | OUTPATIENT
Start: 2023-01-01 | End: 2023-07-08

## 2023-01-01 RX ORDER — DONEPEZIL HYDROCHLORIDE 5 MG/1
5 TABLET, FILM COATED ORAL EVERY EVENING
Status: DISCONTINUED | OUTPATIENT
Start: 2023-01-01 | End: 2023-01-01 | Stop reason: HOSPADM

## 2023-01-01 RX ORDER — QUETIAPINE FUMARATE 25 MG/1
12.5 TABLET, FILM COATED ORAL 2 TIMES DAILY
DISCHARGE
Start: 2023-01-01 | End: 2023-01-01

## 2023-01-01 RX ORDER — FUROSEMIDE 20 MG
20 TABLET ORAL DAILY
Status: DISCONTINUED | OUTPATIENT
Start: 2023-01-01 | End: 2023-01-01

## 2023-01-01 RX ORDER — EPINEPHRINE 0.3 MG/.3ML
0.3 INJECTION SUBCUTANEOUS
Status: DISCONTINUED | OUTPATIENT
Start: 2023-01-01 | End: 2023-01-01

## 2023-01-01 RX ORDER — FAMOTIDINE 20 MG/1
20 TABLET, FILM COATED ORAL 2 TIMES DAILY
Status: DISCONTINUED | OUTPATIENT
Start: 2023-01-01 | End: 2023-01-01 | Stop reason: HOSPADM

## 2023-01-01 RX ORDER — ATROPINE SULFATE 10 MG/ML
2 SOLUTION/ DROPS OPHTHALMIC
Status: DISCONTINUED | OUTPATIENT
Start: 2023-01-01 | End: 2023-01-01 | Stop reason: HOSPADM

## 2023-01-01 RX ORDER — QUETIAPINE FUMARATE 25 MG/1
25 TABLET, FILM COATED ORAL AT BEDTIME
Status: DISCONTINUED | OUTPATIENT
Start: 2023-01-01 | End: 2023-01-01 | Stop reason: HOSPADM

## 2023-01-01 RX ORDER — HALOPERIDOL 5 MG/ML
2 INJECTION INTRAMUSCULAR EVERY 6 HOURS PRN
Status: DISCONTINUED | OUTPATIENT
Start: 2023-01-01 | End: 2023-01-01

## 2023-01-01 RX ORDER — ALBUTEROL SULFATE 0.83 MG/ML
2.5 SOLUTION RESPIRATORY (INHALATION)
Status: DISCONTINUED | OUTPATIENT
Start: 2023-01-01 | End: 2023-01-01 | Stop reason: HOSPADM

## 2023-01-01 RX ORDER — MORPHINE SULFATE 20 MG/ML
5 SOLUTION ORAL
Status: DISCONTINUED | OUTPATIENT
Start: 2023-01-01 | End: 2023-01-01 | Stop reason: HOSPADM

## 2023-01-01 RX ORDER — LORAZEPAM 1 MG/1
1 TABLET ORAL EVERY 4 HOURS
COMMUNITY

## 2023-01-01 RX ORDER — ATORVASTATIN CALCIUM 40 MG/1
40 TABLET, FILM COATED ORAL AT BEDTIME
Status: DISCONTINUED | OUTPATIENT
Start: 2023-01-01 | End: 2023-01-01 | Stop reason: HOSPADM

## 2023-01-01 RX ORDER — ACETAMINOPHEN 650 MG/1
650 SUPPOSITORY RECTAL EVERY 6 HOURS PRN
Status: DISCONTINUED | OUTPATIENT
Start: 2023-01-01 | End: 2023-01-01 | Stop reason: HOSPADM

## 2023-01-01 RX ORDER — IPRATROPIUM BROMIDE AND ALBUTEROL SULFATE 2.5; .5 MG/3ML; MG/3ML
3 SOLUTION RESPIRATORY (INHALATION) EVERY 4 HOURS PRN
Status: DISCONTINUED | OUTPATIENT
Start: 2023-01-01 | End: 2023-01-01 | Stop reason: HOSPADM

## 2023-01-01 RX ORDER — MORPHINE SULFATE 10 MG/5ML
5 SOLUTION ORAL
Status: DISCONTINUED | OUTPATIENT
Start: 2023-01-01 | End: 2023-01-01 | Stop reason: HOSPADM

## 2023-01-01 RX ORDER — AZITHROMYCIN 500 MG/5ML
500 INJECTION, POWDER, LYOPHILIZED, FOR SOLUTION INTRAVENOUS EVERY 24 HOURS
Status: DISCONTINUED | OUTPATIENT
Start: 2023-01-01 | End: 2023-01-01

## 2023-01-01 RX ORDER — METOPROLOL TARTRATE 25 MG/1
25 TABLET, FILM COATED ORAL 2 TIMES DAILY
Status: DISCONTINUED | OUTPATIENT
Start: 2023-01-01 | End: 2023-01-01

## 2023-01-01 RX ORDER — ONDANSETRON 4 MG/1
4 TABLET, ORALLY DISINTEGRATING ORAL EVERY 6 HOURS PRN
Status: DISCONTINUED | OUTPATIENT
Start: 2023-01-01 | End: 2023-01-01 | Stop reason: HOSPADM

## 2023-01-01 RX ORDER — LORAZEPAM 2 MG/ML
1 INJECTION INTRAMUSCULAR
Status: DISCONTINUED | OUTPATIENT
Start: 2023-01-01 | End: 2023-01-01

## 2023-01-01 RX ORDER — AMPICILLIN AND SULBACTAM 2; 1 G/1; G/1
3 INJECTION, POWDER, FOR SOLUTION INTRAMUSCULAR; INTRAVENOUS EVERY 6 HOURS
Status: DISCONTINUED | OUTPATIENT
Start: 2023-01-01 | End: 2023-01-01 | Stop reason: HOSPADM

## 2023-01-01 RX ORDER — NALOXONE HYDROCHLORIDE 0.4 MG/ML
0.1 INJECTION, SOLUTION INTRAMUSCULAR; INTRAVENOUS; SUBCUTANEOUS
Status: DISCONTINUED | OUTPATIENT
Start: 2023-01-01 | End: 2023-01-01 | Stop reason: HOSPADM

## 2023-01-01 RX ORDER — LORAZEPAM 2 MG/ML
2 INJECTION INTRAMUSCULAR
Status: DISCONTINUED | OUTPATIENT
Start: 2023-01-01 | End: 2023-01-01

## 2023-01-01 RX ORDER — METOPROLOL SUCCINATE 50 MG/1
50 TABLET, EXTENDED RELEASE ORAL DAILY
Status: DISCONTINUED | OUTPATIENT
Start: 2023-01-01 | End: 2023-01-01

## 2023-01-01 RX ORDER — MICONAZOLE NITRATE 20 MG/G
CREAM TOPICAL 2 TIMES DAILY
COMMUNITY

## 2023-01-01 RX ORDER — ACETAMINOPHEN 500 MG
1000 TABLET ORAL EVERY 8 HOURS PRN
Status: DISCONTINUED | OUTPATIENT
Start: 2023-01-01 | End: 2023-01-01

## 2023-01-01 RX ORDER — FAMOTIDINE 10 MG
10 TABLET ORAL EVERY MORNING
Status: DISCONTINUED | OUTPATIENT
Start: 2023-01-01 | End: 2023-01-01 | Stop reason: HOSPADM

## 2023-01-01 RX ORDER — ACETAMINOPHEN 325 MG/10.15ML
650 LIQUID ORAL EVERY 6 HOURS PRN
Status: DISCONTINUED | OUTPATIENT
Start: 2023-01-01 | End: 2023-01-01 | Stop reason: HOSPADM

## 2023-01-01 RX ORDER — LANOLIN ALCOHOL/MO/W.PET/CERES
3 CREAM (GRAM) TOPICAL EVERY EVENING
COMMUNITY
End: 2023-01-01

## 2023-01-01 RX ORDER — CEFTRIAXONE 2 G/1
2 INJECTION, POWDER, FOR SOLUTION INTRAMUSCULAR; INTRAVENOUS ONCE
Status: COMPLETED | OUTPATIENT
Start: 2023-01-01 | End: 2023-01-01

## 2023-01-01 RX ORDER — HALOPERIDOL 5 MG/ML
2 INJECTION INTRAMUSCULAR
Status: DISCONTINUED | OUTPATIENT
Start: 2023-01-01 | End: 2023-01-01 | Stop reason: HOSPADM

## 2023-01-01 RX ORDER — ACETAMINOPHEN 500 MG
1000 TABLET ORAL EVERY 8 HOURS PRN
COMMUNITY
End: 2023-01-01

## 2023-01-01 RX ORDER — GUAIFENESIN 600 MG/1
600 TABLET, EXTENDED RELEASE ORAL 2 TIMES DAILY PRN
COMMUNITY
End: 2023-01-01

## 2023-01-01 RX ORDER — LORAZEPAM 1 MG/1
1 TABLET ORAL
Status: DISCONTINUED | OUTPATIENT
Start: 2023-01-01 | End: 2023-01-01

## 2023-01-01 RX ORDER — GUAIFENESIN 200 MG/10ML
200 LIQUID ORAL EVERY 4 HOURS
Status: DISCONTINUED | OUTPATIENT
Start: 2023-01-01 | End: 2023-01-01 | Stop reason: HOSPADM

## 2023-01-01 RX ORDER — FUROSEMIDE 10 MG/ML
40 INJECTION INTRAMUSCULAR; INTRAVENOUS ONCE
Status: COMPLETED | OUTPATIENT
Start: 2023-01-01 | End: 2023-01-01

## 2023-01-01 RX ORDER — IPRATROPIUM BROMIDE AND ALBUTEROL 20; 100 UG/1; UG/1
1 SPRAY, METERED RESPIRATORY (INHALATION) 2 TIMES DAILY
COMMUNITY
End: 2023-01-01

## 2023-01-01 RX ORDER — IPRATROPIUM BROMIDE AND ALBUTEROL SULFATE 2.5; .5 MG/3ML; MG/3ML
3 SOLUTION RESPIRATORY (INHALATION)
Status: DISCONTINUED | OUTPATIENT
Start: 2023-01-01 | End: 2023-01-01 | Stop reason: HOSPADM

## 2023-01-01 RX ADMIN — GUAIFENESIN 200 MG: 200 SOLUTION ORAL at 23:07

## 2023-01-01 RX ADMIN — PREDNISONE 40 MG: 20 TABLET ORAL at 19:14

## 2023-01-01 RX ADMIN — GUAIFENESIN 200 MG: 200 SOLUTION ORAL at 09:12

## 2023-01-01 RX ADMIN — FUROSEMIDE 40 MG: 10 INJECTION, SOLUTION INTRAMUSCULAR; INTRAVENOUS at 16:49

## 2023-01-01 RX ADMIN — HALOPERIDOL LACTATE 2 MG: 5 INJECTION, SOLUTION INTRAMUSCULAR at 23:05

## 2023-01-01 RX ADMIN — LORAZEPAM 0.5 MG: 2 INJECTION INTRAMUSCULAR; INTRAVENOUS at 16:51

## 2023-01-01 RX ADMIN — SENNOSIDES AND DOCUSATE SODIUM 1 TABLET: 8.6; 5 TABLET ORAL at 19:50

## 2023-01-01 RX ADMIN — GUAIFENESIN 200 MG: 200 SOLUTION ORAL at 19:30

## 2023-01-01 RX ADMIN — QUETIAPINE FUMARATE 12.5 MG: 25 TABLET ORAL at 09:45

## 2023-01-01 RX ADMIN — GUAIFENESIN 200 MG: 200 SOLUTION ORAL at 21:36

## 2023-01-01 RX ADMIN — IPRATROPIUM BROMIDE AND ALBUTEROL SULFATE 3 ML: 2.5; .5 SOLUTION RESPIRATORY (INHALATION) at 19:58

## 2023-01-01 RX ADMIN — ATROPINE SULFATE 2 DROP: 10 SOLUTION OPHTHALMIC at 23:06

## 2023-01-01 RX ADMIN — HUMAN ALBUMIN MICROSPHERES AND PERFLUTREN 3 ML: 10; .22 INJECTION, SOLUTION INTRAVENOUS at 14:43

## 2023-01-01 RX ADMIN — IPRATROPIUM BROMIDE AND ALBUTEROL SULFATE 3 ML: 2.5; .5 SOLUTION RESPIRATORY (INHALATION) at 08:20

## 2023-01-01 RX ADMIN — AMPICILLIN SODIUM AND SULBACTAM SODIUM 3 G: 2; 1 INJECTION, POWDER, FOR SOLUTION INTRAMUSCULAR; INTRAVENOUS at 22:55

## 2023-01-01 RX ADMIN — ATORVASTATIN CALCIUM 40 MG: 40 TABLET, FILM COATED ORAL at 21:56

## 2023-01-01 RX ADMIN — FAMOTIDINE 10 MG: 10 TABLET ORAL at 09:47

## 2023-01-01 RX ADMIN — PREDNISONE 40 MG: 20 TABLET ORAL at 08:48

## 2023-01-01 RX ADMIN — IPRATROPIUM BROMIDE AND ALBUTEROL SULFATE 3 ML: 2.5; .5 SOLUTION RESPIRATORY (INHALATION) at 11:57

## 2023-01-01 RX ADMIN — AMPICILLIN SODIUM AND SULBACTAM SODIUM 3 G: 2; 1 INJECTION, POWDER, FOR SOLUTION INTRAMUSCULAR; INTRAVENOUS at 11:34

## 2023-01-01 RX ADMIN — HALOPERIDOL LACTATE 2 MG: 5 INJECTION, SOLUTION INTRAMUSCULAR at 14:42

## 2023-01-01 RX ADMIN — AMPICILLIN SODIUM AND SULBACTAM SODIUM 3 G: 2; 1 INJECTION, POWDER, FOR SOLUTION INTRAMUSCULAR; INTRAVENOUS at 16:58

## 2023-01-01 RX ADMIN — IPRATROPIUM BROMIDE AND ALBUTEROL SULFATE 3 ML: 2.5; .5 SOLUTION RESPIRATORY (INHALATION) at 15:37

## 2023-01-01 RX ADMIN — RIVAROXABAN 20 MG: 20 TABLET, FILM COATED ORAL at 17:00

## 2023-01-01 RX ADMIN — FUROSEMIDE 20 MG: 20 TABLET ORAL at 09:25

## 2023-01-01 RX ADMIN — HALOPERIDOL LACTATE 2 MG: 5 INJECTION, SOLUTION INTRAMUSCULAR at 05:12

## 2023-01-01 RX ADMIN — MORPHINE SULFATE 5 MG: 10 SOLUTION ORAL at 14:03

## 2023-01-01 RX ADMIN — IPRATROPIUM BROMIDE AND ALBUTEROL SULFATE 3 ML: 2.5; .5 SOLUTION RESPIRATORY (INHALATION) at 12:10

## 2023-01-01 RX ADMIN — HALOPERIDOL LACTATE 2 MG: 5 INJECTION, SOLUTION INTRAMUSCULAR at 07:04

## 2023-01-01 RX ADMIN — AMPICILLIN SODIUM AND SULBACTAM SODIUM 3 G: 2; 1 INJECTION, POWDER, FOR SOLUTION INTRAMUSCULAR; INTRAVENOUS at 23:04

## 2023-01-01 RX ADMIN — IPRATROPIUM BROMIDE AND ALBUTEROL SULFATE 3 ML: 2.5; .5 SOLUTION RESPIRATORY (INHALATION) at 11:14

## 2023-01-01 RX ADMIN — RIVAROXABAN 20 MG: 20 TABLET, FILM COATED ORAL at 19:32

## 2023-01-01 RX ADMIN — GUAIFENESIN 200 MG: 200 SOLUTION ORAL at 15:18

## 2023-01-01 RX ADMIN — DICLOFENAC SODIUM 2 G: 10 GEL TOPICAL at 21:00

## 2023-01-01 RX ADMIN — ATORVASTATIN CALCIUM 40 MG: 40 TABLET, FILM COATED ORAL at 21:34

## 2023-01-01 RX ADMIN — METOPROLOL TARTRATE 25 MG: 25 TABLET, FILM COATED ORAL at 10:06

## 2023-01-01 RX ADMIN — AMPICILLIN SODIUM AND SULBACTAM SODIUM 3 G: 2; 1 INJECTION, POWDER, FOR SOLUTION INTRAMUSCULAR; INTRAVENOUS at 11:51

## 2023-01-01 RX ADMIN — MORPHINE SULFATE 10 MG: 10 SOLUTION ORAL at 16:19

## 2023-01-01 RX ADMIN — AMPICILLIN SODIUM AND SULBACTAM SODIUM 3 G: 2; 1 INJECTION, POWDER, FOR SOLUTION INTRAMUSCULAR; INTRAVENOUS at 00:26

## 2023-01-01 RX ADMIN — FAMOTIDINE 10 MG: 10 TABLET ORAL at 09:25

## 2023-01-01 RX ADMIN — SODIUM CHLORIDE: 9 INJECTION, SOLUTION INTRAVENOUS at 10:29

## 2023-01-01 RX ADMIN — GUAIFENESIN 200 MG: 200 SOLUTION ORAL at 22:07

## 2023-01-01 RX ADMIN — IPRATROPIUM BROMIDE AND ALBUTEROL SULFATE 3 ML: 2.5; .5 SOLUTION RESPIRATORY (INHALATION) at 07:10

## 2023-01-01 RX ADMIN — CEFTRIAXONE 2 G: 2 INJECTION, POWDER, FOR SOLUTION INTRAMUSCULAR; INTRAVENOUS at 22:10

## 2023-01-01 RX ADMIN — AMPICILLIN SODIUM AND SULBACTAM SODIUM 3 G: 2; 1 INJECTION, POWDER, FOR SOLUTION INTRAMUSCULAR; INTRAVENOUS at 23:18

## 2023-01-01 RX ADMIN — DONEPEZIL HYDROCHLORIDE 5 MG: 5 TABLET ORAL at 19:50

## 2023-01-01 RX ADMIN — METOPROLOL SUCCINATE 50 MG: 50 TABLET, EXTENDED RELEASE ORAL at 08:38

## 2023-01-01 RX ADMIN — AMPICILLIN SODIUM AND SULBACTAM SODIUM 3 G: 2; 1 INJECTION, POWDER, FOR SOLUTION INTRAMUSCULAR; INTRAVENOUS at 17:00

## 2023-01-01 RX ADMIN — IPRATROPIUM BROMIDE AND ALBUTEROL SULFATE 3 ML: 2.5; .5 SOLUTION RESPIRATORY (INHALATION) at 15:48

## 2023-01-01 RX ADMIN — GUAIFENESIN 200 MG: 200 SOLUTION ORAL at 09:50

## 2023-01-01 RX ADMIN — IPRATROPIUM BROMIDE AND ALBUTEROL SULFATE 3 ML: 2.5; .5 SOLUTION RESPIRATORY (INHALATION) at 08:39

## 2023-01-01 RX ADMIN — AMPICILLIN SODIUM AND SULBACTAM SODIUM 3 G: 2; 1 INJECTION, POWDER, FOR SOLUTION INTRAMUSCULAR; INTRAVENOUS at 09:03

## 2023-01-01 RX ADMIN — AMPICILLIN SODIUM AND SULBACTAM SODIUM 3 G: 2; 1 INJECTION, POWDER, FOR SOLUTION INTRAMUSCULAR; INTRAVENOUS at 04:20

## 2023-01-01 RX ADMIN — IPRATROPIUM BROMIDE AND ALBUTEROL SULFATE 3 ML: 2.5; .5 SOLUTION RESPIRATORY (INHALATION) at 12:12

## 2023-01-01 RX ADMIN — HALOPERIDOL LACTATE 2 MG: 5 INJECTION, SOLUTION INTRAMUSCULAR at 19:04

## 2023-01-01 RX ADMIN — GUAIFENESIN 200 MG: 200 SOLUTION ORAL at 12:42

## 2023-01-01 RX ADMIN — METOPROLOL TARTRATE 25 MG: 25 TABLET, FILM COATED ORAL at 09:06

## 2023-01-01 RX ADMIN — AMPICILLIN SODIUM AND SULBACTAM SODIUM 3 G: 2; 1 INJECTION, POWDER, FOR SOLUTION INTRAMUSCULAR; INTRAVENOUS at 04:45

## 2023-01-01 RX ADMIN — FAMOTIDINE 10 MG: 10 TABLET ORAL at 10:06

## 2023-01-01 RX ADMIN — IPRATROPIUM BROMIDE AND ALBUTEROL SULFATE 3 ML: 2.5; .5 SOLUTION RESPIRATORY (INHALATION) at 07:31

## 2023-01-01 RX ADMIN — IPRATROPIUM BROMIDE AND ALBUTEROL SULFATE 3 ML: 2.5; .5 SOLUTION RESPIRATORY (INHALATION) at 09:04

## 2023-01-01 RX ADMIN — AMPICILLIN SODIUM AND SULBACTAM SODIUM 3 G: 2; 1 INJECTION, POWDER, FOR SOLUTION INTRAMUSCULAR; INTRAVENOUS at 08:40

## 2023-01-01 RX ADMIN — QUETIAPINE FUMARATE 12.5 MG: 25 TABLET ORAL at 16:03

## 2023-01-01 RX ADMIN — GUAIFENESIN 200 MG: 200 SOLUTION ORAL at 04:40

## 2023-01-01 RX ADMIN — GUAIFENESIN 200 MG: 200 SOLUTION ORAL at 10:06

## 2023-01-01 RX ADMIN — GUAIFENESIN 200 MG: 200 SOLUTION ORAL at 13:36

## 2023-01-01 RX ADMIN — ACETAMINOPHEN 1000 MG: 500 TABLET, FILM COATED ORAL at 19:15

## 2023-01-01 RX ADMIN — HALOPERIDOL LACTATE 2 MG: 5 INJECTION, SOLUTION INTRAMUSCULAR at 04:01

## 2023-01-01 RX ADMIN — AMPICILLIN SODIUM AND SULBACTAM SODIUM 3 G: 2; 1 INJECTION, POWDER, FOR SOLUTION INTRAMUSCULAR; INTRAVENOUS at 23:09

## 2023-01-01 RX ADMIN — AMPICILLIN SODIUM AND SULBACTAM SODIUM 3 G: 2; 1 INJECTION, POWDER, FOR SOLUTION INTRAMUSCULAR; INTRAVENOUS at 05:06

## 2023-01-01 RX ADMIN — AMPICILLIN SODIUM AND SULBACTAM SODIUM 3 G: 2; 1 INJECTION, POWDER, FOR SOLUTION INTRAMUSCULAR; INTRAVENOUS at 12:19

## 2023-01-01 RX ADMIN — SENNOSIDES AND DOCUSATE SODIUM 1 TABLET: 8.6; 5 TABLET ORAL at 09:06

## 2023-01-01 RX ADMIN — RIVAROXABAN 20 MG: 20 TABLET, FILM COATED ORAL at 16:48

## 2023-01-01 RX ADMIN — AZITHROMYCIN MONOHYDRATE 500 MG: 500 INJECTION, POWDER, LYOPHILIZED, FOR SOLUTION INTRAVENOUS at 20:13

## 2023-01-01 RX ADMIN — GUAIFENESIN 200 MG: 200 SOLUTION ORAL at 09:26

## 2023-01-01 RX ADMIN — SODIUM CHLORIDE 500 ML: 9 INJECTION, SOLUTION INTRAVENOUS at 18:52

## 2023-01-01 RX ADMIN — SENNOSIDES AND DOCUSATE SODIUM 1 TABLET: 8.6; 5 TABLET ORAL at 22:05

## 2023-01-01 RX ADMIN — CEFTRIAXONE 1 G: 1 INJECTION, POWDER, FOR SOLUTION INTRAMUSCULAR; INTRAVENOUS at 19:14

## 2023-01-01 RX ADMIN — AMPICILLIN SODIUM AND SULBACTAM SODIUM 3 G: 2; 1 INJECTION, POWDER, FOR SOLUTION INTRAMUSCULAR; INTRAVENOUS at 11:16

## 2023-01-01 RX ADMIN — FAMOTIDINE 20 MG: 20 TABLET ORAL at 08:38

## 2023-01-01 RX ADMIN — ACETAMINOPHEN 1000 MG: 500 TABLET ORAL at 14:39

## 2023-01-01 RX ADMIN — AZITHROMYCIN MONOHYDRATE 500 MG: 500 INJECTION, POWDER, LYOPHILIZED, FOR SOLUTION INTRAVENOUS at 22:43

## 2023-01-01 RX ADMIN — AMPICILLIN SODIUM AND SULBACTAM SODIUM 3 G: 2; 1 INJECTION, POWDER, FOR SOLUTION INTRAMUSCULAR; INTRAVENOUS at 05:13

## 2023-01-01 RX ADMIN — FAMOTIDINE 10 MG: 10 TABLET ORAL at 09:06

## 2023-01-01 RX ADMIN — METOPROLOL TARTRATE 25 MG: 25 TABLET, FILM COATED ORAL at 09:25

## 2023-01-01 RX ADMIN — AMPICILLIN SODIUM AND SULBACTAM SODIUM 3 G: 2; 1 INJECTION, POWDER, FOR SOLUTION INTRAMUSCULAR; INTRAVENOUS at 17:30

## 2023-01-01 RX ADMIN — ATROPINE SULFATE 2 DROP: 10 SOLUTION OPHTHALMIC at 17:19

## 2023-01-01 RX ADMIN — DONEPEZIL HYDROCHLORIDE 5 MG: 5 TABLET ORAL at 19:32

## 2023-01-01 RX ADMIN — AMPICILLIN SODIUM AND SULBACTAM SODIUM 3 G: 2; 1 INJECTION, POWDER, FOR SOLUTION INTRAMUSCULAR; INTRAVENOUS at 16:56

## 2023-01-01 RX ADMIN — QUETIAPINE FUMARATE 12.5 MG: 25 TABLET ORAL at 17:08

## 2023-01-01 RX ADMIN — ALBUTEROL SULFATE 2 PUFF: 90 AEROSOL, METERED RESPIRATORY (INHALATION) at 19:13

## 2023-01-01 RX ADMIN — IPRATROPIUM BROMIDE AND ALBUTEROL SULFATE 3 ML: 2.5; .5 SOLUTION RESPIRATORY (INHALATION) at 16:54

## 2023-01-01 RX ADMIN — FUROSEMIDE 20 MG: 20 TABLET ORAL at 13:36

## 2023-01-01 RX ADMIN — AMPICILLIN SODIUM AND SULBACTAM SODIUM 3 G: 2; 1 INJECTION, POWDER, FOR SOLUTION INTRAMUSCULAR; INTRAVENOUS at 19:08

## 2023-01-01 RX ADMIN — SENNOSIDES AND DOCUSATE SODIUM 1 TABLET: 8.6; 5 TABLET ORAL at 19:33

## 2023-01-01 RX ADMIN — IPRATROPIUM BROMIDE AND ALBUTEROL SULFATE 3 ML: 2.5; .5 SOLUTION RESPIRATORY (INHALATION) at 21:05

## 2023-01-01 RX ADMIN — METOPROLOL TARTRATE 25 MG: 25 TABLET, FILM COATED ORAL at 20:39

## 2023-01-01 RX ADMIN — MORPHINE SULFATE 5 MG: 10 SOLUTION ORAL at 20:02

## 2023-01-01 RX ADMIN — METOPROLOL TARTRATE 25 MG: 25 TABLET, FILM COATED ORAL at 19:33

## 2023-01-01 RX ADMIN — DONEPEZIL HYDROCHLORIDE 5 MG: 5 TABLET ORAL at 20:39

## 2023-01-01 RX ADMIN — DONEPEZIL HYDROCHLORIDE 5 MG: 5 TABLET ORAL at 22:06

## 2023-01-01 RX ADMIN — SODIUM CHLORIDE 1000 ML: 9 INJECTION, SOLUTION INTRAVENOUS at 20:13

## 2023-01-01 RX ADMIN — METOPROLOL TARTRATE 25 MG: 25 TABLET, FILM COATED ORAL at 19:50

## 2023-01-01 RX ADMIN — SENNOSIDES AND DOCUSATE SODIUM 1 TABLET: 8.6; 5 TABLET ORAL at 20:39

## 2023-01-01 RX ADMIN — HALOPERIDOL LACTATE 2 MG: 5 INJECTION, SOLUTION INTRAMUSCULAR at 11:03

## 2023-01-01 RX ADMIN — LORAZEPAM 1 MG: 2 INJECTION INTRAMUSCULAR; INTRAVENOUS at 14:42

## 2023-01-01 RX ADMIN — IPRATROPIUM BROMIDE AND ALBUTEROL SULFATE 3 ML: 2.5; .5 SOLUTION RESPIRATORY (INHALATION) at 14:03

## 2023-01-01 RX ADMIN — IPRATROPIUM BROMIDE AND ALBUTEROL SULFATE 3 ML: 2.5; .5 SOLUTION RESPIRATORY (INHALATION) at 19:31

## 2023-01-01 RX ADMIN — AMPICILLIN SODIUM AND SULBACTAM SODIUM 3 G: 2; 1 INJECTION, POWDER, FOR SOLUTION INTRAMUSCULAR; INTRAVENOUS at 14:04

## 2023-01-01 RX ADMIN — HALOPERIDOL LACTATE 2 MG: 5 INJECTION, SOLUTION INTRAMUSCULAR at 01:44

## 2023-01-01 ASSESSMENT — ACTIVITIES OF DAILY LIVING (ADL)
ADLS_ACUITY_SCORE: 32.25
ADLS_ACUITY_SCORE: 31.25
ADLS_ACUITY_SCORE: 31.25
ADLS_ACUITY_SCORE: 32.25
ADLS_ACUITY_SCORE: 32.25
ADLS_ACUITY_SCORE: 30.25
ADLS_ACUITY_SCORE: 19.25
ADLS_ACUITY_SCORE: 31.25
ADLS_ACUITY_SCORE: 20.25
ADLS_ACUITY_SCORE: 17.25
ADLS_ACUITY_SCORE: 31.25
ADLS_ACUITY_SCORE: 32.25
ADLS_ACUITY_SCORE: 30.25
ADLS_ACUITY_SCORE: 17.25
ADLS_ACUITY_SCORE: 17.25
ADLS_ACUITY_SCORE: 19.25
ADLS_ACUITY_SCORE: 32.25
ADLS_ACUITY_SCORE: 30.25
ADLS_ACUITY_SCORE: 31.25
ADLS_ACUITY_SCORE: 31.25
ADLS_ACUITY_SCORE: 30.25
ADLS_ACUITY_SCORE: 17.25
ADLS_ACUITY_SCORE: 31.25
ADLS_ACUITY_SCORE: 17.25
ADLS_ACUITY_SCORE: 31.25
ADLS_ACUITY_SCORE: 20.25
ADLS_ACUITY_SCORE: 17.25
ADLS_ACUITY_SCORE: 30.25
ADLS_ACUITY_SCORE: 19.25
ADLS_ACUITY_SCORE: 31.25
ADLS_ACUITY_SCORE: 30.25
ADLS_ACUITY_SCORE: 31.25
ADLS_ACUITY_SCORE: 30.25
ADLS_ACUITY_SCORE: 31.25
ADLS_ACUITY_SCORE: 17.25
ADLS_ACUITY_SCORE: 31.25
ADLS_ACUITY_SCORE: 17.25
ADLS_ACUITY_SCORE: 31.25
ADLS_ACUITY_SCORE: 30.25
ADLS_ACUITY_SCORE: 30.25
ADLS_ACUITY_SCORE: 29.25
ADLS_ACUITY_SCORE: 32.25
ADLS_ACUITY_SCORE: 31.25
ADLS_ACUITY_SCORE: 19.25
ADLS_ACUITY_SCORE: 30.25
ADLS_ACUITY_SCORE: 17.25
ADLS_ACUITY_SCORE: 19.25
ADLS_ACUITY_SCORE: 30.25
ADLS_ACUITY_SCORE: 19.25
ADLS_ACUITY_SCORE: 32.25
ADLS_ACUITY_SCORE: 31.25
ADLS_ACUITY_SCORE: 17.25
ADLS_ACUITY_SCORE: 31.25
ADLS_ACUITY_SCORE: 31.25
ADLS_ACUITY_SCORE: 30.25
ADLS_ACUITY_SCORE: 19.25
ADLS_ACUITY_SCORE: 31.25
ADLS_ACUITY_SCORE: 31.25
ADLS_ACUITY_SCORE: 32.25
ADLS_ACUITY_SCORE: 17.25
ADLS_ACUITY_SCORE: 31.25
ADLS_ACUITY_SCORE: 30.25
ADLS_ACUITY_SCORE: 17.25
ADLS_ACUITY_SCORE: 31.25
ADLS_ACUITY_SCORE: 17.25
ADLS_ACUITY_SCORE: 32.25
ADLS_ACUITY_SCORE: 31.25
ADLS_ACUITY_SCORE: 17.25
ADLS_ACUITY_SCORE: 30.25
ADLS_ACUITY_SCORE: 26.25
ADLS_ACUITY_SCORE: 17.25
ADLS_ACUITY_SCORE: 31.25

## 2023-01-01 ASSESSMENT — ENCOUNTER SYMPTOMS
CONFUSION: 1
APPETITE CHANGE: 1
COUGH: 1
FEVER: 0
ABDOMINAL PAIN: 0

## 2023-01-02 NOTE — TELEPHONE ENCOUNTER
Central Prior Authorization Team  Phone: [phone]    PRIOR AUTHORIZATION DENIED    Medication: albuterol/ipratropium 3mg/3ml nebulizer solution    Denial Date: 12/31/2022    Denial Rational:     There is no pharmacy listed for this Rx, so I am unable to call the pharmacy to inform of decision.

## 2023-01-04 NOTE — LETTER
1/4/2023        RE: William Ba  [address] [facility] [facility] [address]        [place] [facility]  [place] Medical Record Number:  [id number]  Place of Service where encounter took place:  [facility] ([facility]) [233]  Chief Complaint   Patient presents with     Cough       HPI:    William Ba  is a 87 year old (1935), who is being seen today for an episodic care visit.  HPI information obtained from: facility chart records, facility staff, patient report, [facility] chart review and family/first contact dtr report. Today's concern is: cough, copd, a-fib. Has had newer cough. Per staff, fever 3 days. Ago. Afebrile since. Has copd. Cont. On Breo, combivent. O2 sats stable. Noted to have hemoptysis yesterday. xarelto dose held.  No further hemoptysis today. Cont. On xarelto for a-fib.       Past Medical and Surgical History reviewed in Epic today.    MEDICATIONS:    Current Outpatient Medications   Medication Sig Dispense Refill     amoxicillin-clavulanate (AUGMENTIN) 875-125 MG tablet Take 1 tablet by mouth 2 times daily       albuterol (PROAIR HFA/PROVENTIL HFA/VENTOLIN HFA) 108 (90 Base) MCG/ACT inhaler Inhale 2 puffs into the lungs every 4 hours as needed for shortness of breath / dyspnea or wheezing       atorvastatin (LIPITOR) 40 MG tablet Take 1 tablet (40 mg) by mouth At Bedtime 90 tablet 3     diclofenac (VOLTAREN) 1 % topical gel Apply 2 g topically 2 times daily       donepezil (ARICEPT) 5 MG tablet Take 5 mg by mouth At Bedtime       EPINEPHrine (ANY BX GENERIC EQUIV) 0.3 MG/0.3ML injection 2-pack Inject 0.3 mLs (0.3 mg) into the muscle as needed for anaphylaxis 1 each 3     famotidine (PEPCID) 10 MG tablet Take 10 mg by mouth every evening       fluticasone-vilanterol (BREO ELLIPTA) 100-25 MCG/INH inhaler Inhale 1 puff into the lungs daily 1 Inhaler 11     ipratropium - albuterol 0.5 mg/2.5 mg/3 mL (DUONEB) 0.5-2.5 (3) MG/3ML neb solution Take 1 vial (3 mLs) by  nebulization 2 times daily. May also take 1 vial (3 mLs) daily as needed for shortness of breath, wheezing or cough. BID and daily PRN       metoprolol succinate ER (TOPROL-XL) 50 MG 24 hr tablet Take 1 tablet (50 mg) by mouth daily 90 tablet 3     Misc Natural Products (OSTEO BI-FLEX ADV TRIPLE ST) TABS Take 1 capsule by mouth 2 times daily        Multiple Vitamins-Minerals (MULTIVITAMIN ADULT PO) Take one(1) tablet daily.       rivaroxaban ANTICOAGULANT (XARELTO) 20 MG TABS tablet Take 1 tablet (20 mg) by mouth daily (with dinner) 90 tablet 3         REVIEW OF SYSTEMS:  Limited secondary to cognitive impairment but today pt reports cough.    Objective:  /72   Pulse 83   Temp 97.4  F (36.3  C)   Resp 18   SpO2 91%   Exam:  GENERAL APPEARANCE:  Alert, cooperative  ENT:  Mouth and posterior oropharynx normal, moist mucous membranes, [place]  EYES:  EOM, conjunctivae, lids, pupils and irises normal, PERRL  RESP:  crackles R base. cough with deep inspiration.  CV:  Palpation and auscultation of heart done , regular rate and rhythm, no murmur, rub, or gallop, no edema  ABDOMEN:  normal bowel sounds, soft, nontender, no hepatosplenomegaly or other masses, no guarding or rebound  M/S:   Gait and station normal  muscle strength 5/5 all 4 ext.  NEURO:   Cranial nerves 2-12 are normal tested and grossly at patient's baseline, no tremor  PSYCH:  insight and judgement impaired, memory impaired , affect and mood normal    Labs:     Most Recent 3 CBC's:Recent Labs   Lab Test 09/13/22  0651 08/28/22  1728 04/29/22  0940 02/01/22  1420   WBC  --  8.0 6.5 6.5   HGB 12.4* 11.6* 12.2* 11.9*   MCV  --  102* 104* 95   PLT  --  135* 159 193     Most Recent 3 BMP's:Recent Labs   Lab Test 08/28/22  1728 04/29/22  0940 02/01/22  1420    144 140   POTASSIUM 4.5 3.7 3.4*   CHLORIDE 107 108* 109*   CO2 25 24 24   BUN 23.3* 19 11   CR 0.77 0.78 0.74   ANIONGAP 7 12 7   NIEVES 8.8 9.1 8.0*   GLC 99 105 63*        ASSESSMENT/PLAN:  (R05.9) Cough, unspecified type  (primary encounter diagnosis)  Comment: newer onset. Recent fever. H/o pneumonia  Plan: 1. Start augmentin  2. Start mucinex  3. Monitor for fever  4. CXR today    (J44.9) Chronic obstructive pulmonary disease, unspecified COPD type (H)  Comment: O2 sats stable. No recent reports of wheezing  Plan: 1. Cont. Breo  2.cont. combivent  3. Follow O2 sats    (I48.91) Atrial fibrillation, unspecified type (H)  Comment: rate stable. Hemoptysis yesterday, likely current resp. Tract infection.  No hemoptysis today  Plan:1. Restart xarelto  2. Monitor for further hemoptysis   3. Cbc, bmp tomorrow    Electronically signed by:  EYAD Diaz CNP               Sincerely,        EYAD Diaz CNP

## 2023-01-04 NOTE — PROGRESS NOTES
[place] [facility]  [place] Medical Record Number:  [id number]  [facility] [233[facility]  Chief Complaint   Patient presents with     Cough       HPI:    William Ba  is a 87 year old (1935), who is being seen today for an episodic care visit.  HPI information obtained from: facility chart records, facility staff, patient report, [facility] chart review and family/first contact dtr report. Today's concern is: cough, copd, a-fib. Has had newer cough. Per staff, fever 3 days. Ago. Afebrile since. Has copd. Cont. On Breo, combivent. O2 sats stable. Noted to have hemoptysis yesterday. xarelto dose held.  No further hemoptysis today. Cont. On xarelto for a-fib.       Past Medical and Surgical History reviewed in Epic today.    MEDICATIONS:    Current Outpatient Medications   Medication Sig Dispense Refill     amoxicillin-clavulanate (AUGMENTIN) 875-125 MG tablet Take 1 tablet by mouth 2 times daily       albuterol (PROAIR HFA/PROVENTIL HFA/VENTOLIN HFA) 108 (90 Base) MCG/ACT inhaler Inhale 2 puffs into the lungs every 4 hours as needed for shortness of breath / dyspnea or wheezing       atorvastatin (LIPITOR) 40 MG tablet Take 1 tablet (40 mg) by mouth At Bedtime 90 tablet 3     diclofenac (VOLTAREN) 1 % topical gel Apply 2 g topically 2 times daily       donepezil (ARICEPT) 5 MG tablet Take 5 mg by mouth At Bedtime       EPINEPHrine (ANY BX GENERIC EQUIV) 0.3 MG/0.3ML injection 2-pack Inject 0.3 mLs (0.3 mg) into the muscle as needed for anaphylaxis 1 each 3     famotidine (PEPCID) 10 MG tablet Take 10 mg by mouth every evening       fluticasone-vilanterol (BREO ELLIPTA) 100-25 MCG/INH inhaler Inhale 1 puff into the lungs daily 1 Inhaler 11     ipratropium - albuterol 0.5 mg/2.5 mg/3 mL (DUONEB) 0.5-2.5 (3) MG/3ML neb solution Take 1 vial (3 mLs) by nebulization 2 times daily. May also take 1 vial (3 mLs) daily as needed for shortness of  abdominal pain/fever breath, wheezing or cough. BID and daily PRN       metoprolol succinate ER (TOPROL-XL) 50 MG 24 hr tablet Take 1 tablet (50 mg) by mouth daily 90 tablet 3     Misc Natural Products (OSTEO BI-FLEX ADV TRIPLE ST) TABS Take 1 capsule by mouth 2 times daily        Multiple Vitamins-Minerals (MULTIVITAMIN ADULT PO) Take one(1) tablet daily.       rivaroxaban ANTICOAGULANT (XARELTO) 20 MG TABS tablet Take 1 tablet (20 mg) by mouth daily (with dinner) 90 tablet 3         REVIEW OF SYSTEMS:  Limited secondary to cognitive impairment but today pt reports cough.    Objective:  /72   Pulse 83   Temp 97.4  F (36.3  C)   Resp 18   SpO2 91%   Exam:  GENERAL APPEARANCE:  Alert, cooperative  ENT:  Mouth and posterior oropharynx normal, moist mucous membranes, [place]  EYES:  EOM, conjunctivae, lids, pupils and irises normal, PERRL  RESP:  crackles R base. cough with deep inspiration.  CV:  Palpation and auscultation of heart done , regular rate and rhythm, no murmur, rub, or gallop, no edema  ABDOMEN:  normal bowel sounds, soft, nontender, no hepatosplenomegaly or other masses, no guarding or rebound  M/S:   Gait and station normal  muscle strength 5/5 all 4 ext.  NEURO:   Cranial nerves 2-12 are normal tested and grossly at patient's baseline, no tremor  PSYCH:  insight and judgement impaired, memory impaired , affect and mood normal    Labs:     Most Recent 3 CBC's:Recent Labs   Lab Test 09/13/22  0651 08/28/22  1728 04/29/22  0940 02/01/22  1420   WBC  --  8.0 6.5 6.5   HGB 12.4* 11.6* 12.2* 11.9*   MCV  --  102* 104* 95   PLT  --  135* 159 193     Most Recent 3 BMP's:Recent Labs   Lab Test 08/28/22  1728 04/29/22  0940 02/01/22  1420    144 140   POTASSIUM 4.5 3.7 3.4*   CHLORIDE 107 108* 109*   CO2 25 24 24   BUN 23.3* 19 11   CR 0.77 0.78 0.74   ANIONGAP 7 12 7   NIEVES 8.8 9.1 8.0*   GLC 99 105 63*       ASSESSMENT/PLAN:  (R05.9) Cough, unspecified type  (primary encounter diagnosis)  Comment: newer onset. Recent  fever. H/o pneumonia  Plan: 1. Start augmentin  2. Start mucinex  3. Monitor for fever  4. CXR today    (J44.9) Chronic obstructive pulmonary disease, unspecified COPD type (H)  Comment: O2 sats stable. No recent reports of wheezing  Plan: 1. Cont. Breo  2.cont. combivent  3. Follow O2 sats    (I48.91) Atrial fibrillation, unspecified type (H)  Comment: rate stable. Hemoptysis yesterday, likely current resp. Tract infection.  No hemoptysis today  Plan:1. Restart xarelto  2. Monitor for further hemoptysis   3. Cbc, bmp tomorrow    Electronically signed by:  EYAD Diaz CNP

## 2023-02-08 NOTE — LETTER
2/8/2023        RE: [facility] Ba  [address] [facility] [address]        William Ba is a 87 year old male seen February 8, 2023 at [facility] where he has resided for one and a half years (admit 8/2021) seen for new skin lesion with infection.   He is seen in his room with Estrella BRENNER and pt's daughter Minerva present.   Three days ago pt had perineal pain and noted to have swelling, warmth and erythema of his scrotum and groin area.   Started on cephalexin and the swelling and inflammation has resolved.   Now a painful mass, with new open areas.   Daughter has brought in a donut for pt to sit on, but has considerable pain.   He is usually continent but needs assist with pericares.       Pt has a h/o HTN, COPD, AAA, atrial fib/flutter anticoagulated with rivaroxaban and dementia.    He was hospitalized in June 2020 following a fall in which he suffered an 8th left rib fracture with small pneumothorax, then rehospitalized a short while later with RLL pneumonia.   He also had a June 2021 ED visit for chest pain thought to be pill esophagitis.    He has been followed in Cardiology clinic for CAD, s/p IMI and per angiogram with chronic mid-RCA obstruction, 50% LAD stenosis and moderate disease elsewhere.   Has had PAF and anticoagulated past 7 years.   AAA on abd CT with diameter 3.3 cm when last checked.    A 48 hour Holter monitor in 2019 showed principle rhythm of atrial flutter, no pauses, few ventricular ectopic beats.   Average HR 99 bpm and metoprolol dose increased at that time.     Pt seen in ED in August 2022 for hematuria, falls and worsened confusion.   Imaging as below  Small renal stone seen.   Treated for possible UTI, but UC negative.  ASA was discontinued at that time secondary to hematuria.     Past Medical History:   Diagnosis Date     Abdominal aortic aneurysm without rupture (H) 2/25/2016     Acute CVA (cerebrovascular accident) (H) 3/28/2019      "Atrial fibrillation/flutter      Atrial fibrillation/flutter (H) 11/13/2019     COPD (chronic obstructive pulmonary disease)      Coronary atherosclerosis 1/29/2015     Mixed hyperlipidemia 11/8/2010   Late onset dementia, Alzheimer's type    Past Surgical History:   Procedure Laterality Date     COLONOSCOPY       HERNIA REPAIR       REPLACEMENT TOTAL KNEE       SH:   Lives with his wife Myriam, [facility].   They previously lived in [place]/ [place] then in [facility] April-August 2021   Daughter Minerva lives in [place] and is first contact  They also have 2 sons   Past smoker, 75 pack year smoking history      ROS:  ACL 4.0    Wt Readings from Last 5 Encounters:   02/08/23 70.3 kg (155 lb)   08/28/22 75.4 kg (166 lb 3.2 oz)   11/10/21 73.5 kg (162 lb)   09/23/20 77.2 kg (170 lb 3.2 oz)   08/31/20 77.1 kg (170 lb)      EXAM: NAD  /74   Pulse 82   Temp 97.8  F (36.6  C)   Resp 20   Ht 1.791 m (5' 10.5\")   Wt 70.3 kg (155 lb)   BMI 21.93 kg/m     [place]  Abd soft, NT, no distention or guarding, +BS  4-5 cm firm mass between rectum and scrotum, with 3 small open areas and white material present  Very tender, fluctuant  Ext without edema  Neuro: limited history, ambulatory without device, needs repetitive cuing     Psych: affect okay, pleasant  Cooperative with exam      Last Comprehensive Metabolic Panel:  Sodium   Date Value Ref Range Status   01/10/2023 140 136 - 145 mmol/L Final     Potassium   Date Value Ref Range Status   01/10/2023 4.0 3.4 - 5.3 mmol/L Final     Carbon Dioxide (CO2)   Date Value Ref Range Status   01/10/2023 21 (L) 22 - 29 mmol/L Final     Glucose   Date Value Ref Range Status   01/10/2023 77 70 - 99 mg/dL Final     Urea Nitrogen   Date Value Ref Range Status   01/10/2023 14.6 8.0 - 23.0 mg/dL Final     Creatinine   Date Value Ref Range Status   01/10/2023 0.81 0.67 - 1.17 mg/dL Final     GFR Estimate   Date Value Ref Range Status   01/10/2023 86 >60 mL/min/1.73m2 Final "     Calcium   Date Value Ref Range Status   01/10/2023 9.2 8.8 - 10.2 mg/dL Final     Lab Results   Component Value Date    WBC 8.5 01/10/2023      HGB 12.0 01/10/2023       01/10/2023       01/10/2023     ECHO 2019:     Left Ventricle: Biplane Ejection Fraction visually appears to be borderline low normal around 50%. Normal cavity size and wall thickness.   Normal left ventricular diastolic function.     Left Atrium: Normal cavity size. Negative bubble study     Aortic Valve: Aortic valve not well visualized. The valve is mildly sclerotic. No stenosis. Trace regurgitation is present.     Right Ventricle: Normal cavity size and right ventricular function.     Pericardium: Pericardium is normal. There is no pericardial effusion.    CT ABDOMEN PELVIS W/O and W CONTRAST 8/28/2022   INDICATION: Hematuria, hx of AAA, hx of stones  LOWER CHEST: Severe emphysematous changes in the lung bases are again noted. There are coronary artery aortic atherosclerotic calcifications. Visualized portions of the lower chest are otherwise unremarkable.  KIDNEYS/BLADDER: There is a nonobstructive left renal pelvic calculus measuring approximately 0.5 x 0.3 x 0.4 cm. Kidneys otherwise enhance normally. No hydronephrosis, hydroureter, or ureteral calculus is identified. Urinary bladder is grossly normal in appearance.  BOWEL: There are scattered diverticula in the colon without pericolonic inflammatory change to suggest acute diverticulitis. Colon is otherwise of normal caliber and appearance. Cecum is flipped anteriorly and medially and superiorly as compared to the expected location with a hypermobile cecum/cecal bascule. No evidence for obstruction is seen. The appendix is not definitely identified. No pericecal inflammatory change to suggest acute appendicitis. Small bowel is grossly of normal caliber and   appearance. The stomach contains a small amount of air and fluid, but is otherwise unremarkable. Gastric wall  thickening is likely due to incomplete distention. There is a small hiatal hernia containing portion of the gastric cardia.  LYMPH NODES: No lymphadenopathy is identified.  VASCULATURE: There is atherosclerosis. There is aneurysmal dilatation of the infrarenal abdominal aorta measuring up to 3.3 x 3.1 cm in cross-section, similar to prior study. There is mild aneurysmal dilatation of the left iliac artery measuring up to 2.0 cm in greatest cross-sectional dimension and the right common iliac artery measuring up to 2.2 cm in greatest cross-sectional dimension. No dissection is seen.  PELVIC ORGANS: Multiple radiation treatment seeds are seen in the prostate gland. Seminal vesicles are unremarkable   MUSCULOSKELETAL: There are degenerative changes in the hips and SI joints, and spine. There is suggestion of demineralization. No aggressive osseous lesions or acute osseous fractures. Central superior endplate compression of the L3 vertebral body is stable since at least 6/10/2020. Anterior superior endplate compression of the T12 vertebral body is also stable since at least 6/10/2020.                                                        IMPRESSION:   1.  Nonobstructive left renal pelvic calculus could be causing intermittent obstruction and could cause hematuria.  2.  No obstructive urinary system process is identified. No ureteral calculus is seen.  3.  Atherosclerosis including the coronary arteries as well as aneurysmal dilatation of the infrarenal abdominal aorta measures up to 3.3 x 3.1 cm in maximum cross-sectional dimension. There is mild aneurysmal dilatation of the common iliac arteries measuring up to greatest cross-sectional dimension of 2.0 cm on the left and 2.2 cm on the right.  4.  Mild colonic diverticulosis without evidence for acute diverticulitis.  5.  Severe emphysematous changes of the lung bases.    CT HEAD W/O CONTRAST 8/28/2022   INTRACRANIAL CONTENTS: No intracranial hemorrhage, extraaxial  collection, or mass effect.  No CT evidence of acute infarct. There is scattered diffuse low attenuation within the periventricular and subcortical white matter consistent with diffuse small vessel ischemic disease. The ventricular system, basal cisterns and the cortical sulci are consistent with diffuse volume loss. This is more prominent involving the anterior temporal lobes.                                                               IMPRESSION:  1.  No CT finding of a mass, hemorrhage or focal area suggestive of infarct.  2.  Stable diffuse age related changes.      IMP/PLAN:   (L02.215) Abscess of perineum   Comment: cellulitis has improved with cephalexin, but still localized infection and starting to drain    Plan: warm packs 4-6x/ day, will need to drain    Ceftriaxone 1gm IM today and tomorrow, then resume cephalexin on Saturday    [facility] nursing to help with wound care.      (J44.9) Chronic obstructive pulmonary disease, unspecified COPD type (H)  Comment: significant smoking history   Plan: Breo Ellipta daily, prn albuterol MDI for rescue     Duonebs have been helpful, but not able to get secondary to shortage.    Start Combivent MDI qid          (I25.10) Coronary artery disease involving native coronary artery of native heart without angina pectoris    Comment: h/o IMI     Plan: atorvastatin 40 mg/day for secondary prevention       (I48.11) Longstanding persistent atrial fibrillation (H)  Comment:   Pulse Readings from Last 4 Encounters:   02/08/23 82   01/04/23 83   11/09/22 84   11/02/22 89      Plan: metoprolol ER 50 mg/day for VR control   Rivaroxaban 20 mg/day for stroke prophylaxis>>>given weight loss, will decrease dose to 15 mg/day at next refill.      (F03.90) Dementia without behavioral disturbance, unspecified dementia type (H)  Comment: GI side effects of diarrhea with urgency on higher dose of donepezil   Plan: [facility] for assist with med admin, meals, activity   Donepezil 5 mg/day        (I71.4) Abdominal aortic aneurysm, without rupture (H)  Comment: 3.3 cm by CT in August 2022  Plan: no further surveillance as long as he is asx     (C61) Malignant neoplasm of prostate (H)  Comment: had brachytherapy in 2010     PSA <0.01 in Sept 2022    Plan: follow     (K21.9) Gastroesophageal reflux disease, unspecified whether esophagitis present  Comment: h/o Schatzki's ring    Plan: famotidine 10 mg/day      Sarah Suresh MD         Documentation of Face-to-Face and Certification for Home Health Services     Patient: William Ba   YOB: 1935  MR Number: [id number]  Today's Date: 2/8/2023    I certify that patient: William Ba is under my care and that I, or a nurse practitioner or physician's assistant working with me, had a face-to-face encounter that meets the physician face-to-face encounter requirements with this patient on: February 8, 2023.    This encounter with the patient was in whole, or in part, for the following medical condition, which is the primary reason for home health care: abscess of perineum.    I certify that, based on my findings, the following services are medically necessary home health services: Nursing.    My clinical findings support the need for the above services because: Nurse is needed: To provide caregiver training to assist with: wound care. and close monitoring of abscess.    Further, I certify that my clinical findings support that this patient is homebound (i.e. absences from home require considerable and taxing effort and are for medical reasons or [employer] services or infrequently or of short duration when for other reasons) because: Leaving home is medically contraindicated for the following reason(s): Dyspnea on exertion that makes it so they cannot leave their home for needed services without clinical deterioration. and Pressure on open wounds during transport impairs healing process...    Based on the above findings. I certify that this patient  is confined to the home and needs intermittent skilled nursing care, physical therapy and/or speech therapy.  The patient is under my care, and I have initiated the establishment of the plan of care.  This patient will be followed by a physician who will periodically review the plan of care.  Physician/Provider to provide follow up care: Abraham Velez    Responsible Medicare certified P[employer] Physician: Sarah Suresh MD   Physician Signature: See electronic signature associated with these discharge orders.  Date: 2/8/2023          Sincerely,        Sarah Suresh MD

## 2023-02-08 NOTE — PROGRESS NOTES
William Ba is a 87 year old male seen February 8, 2023 at [facility] where he has resided for one and a half years (admit 8/2021) seen for new skin lesion with infection.   He is seen in his room with Estrella BRENNER and pt's daughter Minerva present.   Three days ago pt had perineal pain and noted to have swelling, warmth and erythema of his scrotum and groin area.   Started on cephalexin and the swelling and inflammation has resolved.   Now a painful mass, with new open areas.   Daughter has brought in a donut for pt to sit on, but has considerable pain.   He is usually continent but needs assist with pericares.       Pt has a h/o HTN, COPD, AAA, atrial fib/flutter anticoagulated with rivaroxaban and dementia.    He was hospitalized in June 2020 following a fall in which he suffered an 8th left rib fracture with small pneumothorax, then rehospitalized a short while later with RLL pneumonia.   He also had a June 2021 ED visit for chest pain thought to be pill esophagitis.    He has been followed in Cardiology clinic for CAD, s/p IMI and per angiogram with chronic mid-RCA obstruction, 50% LAD stenosis and moderate disease elsewhere.   Has had PAF and anticoagulated past 7 years.   AAA on abd CT with diameter 3.3 cm when last checked.    A 48 hour Holter monitor in 2019 showed principle rhythm of atrial flutter, no pauses, few ventricular ectopic beats.   Average HR 99 bpm and metoprolol dose increased at that time.     Pt seen in ED in August 2022 for hematuria, falls and worsened confusion.   Imaging as below  Small renal stone seen.   Treated for possible UTI, but UC negative.  ASA was discontinued at that time secondary to hematuria.     Past Medical History:   Diagnosis Date     Abdominal aortic aneurysm without rupture (H) 2/25/2016     Acute CVA (cerebrovascular accident) (H) 3/28/2019     Atrial fibrillation/flutter      Atrial fibrillation/flutter (H) 11/13/2019     COPD (chronic  "obstructive pulmonary disease)      Coronary atherosclerosis 1/29/2015     Mixed hyperlipidemia 11/8/2010   Late onset dementia, Alzheimer's type    Past Surgical History:   Procedure Laterality Date     COLONOSCOPY       HERNIA REPAIR       REPLACEMENT TOTAL KNEE       SH:   Lives with his wife Myriam, [facility].   They previously lived in [place]/ [place] then in [facility] April-August 2021   Daughter Minerva lives in [place] and is first contact  They also have 2 sons   Past smoker, 75 pack year smoking history      ROS:  ACL 4.0    Wt Readings from Last 5 Encounters:   02/08/23 70.3 kg (155 lb)   08/28/22 75.4 kg (166 lb 3.2 oz)   11/10/21 73.5 kg (162 lb)   09/23/20 77.2 kg (170 lb 3.2 oz)   08/31/20 77.1 kg (170 lb)      EXAM: NAD  /74   Pulse 82   Temp 97.8  F (36.6  C)   Resp 20   Ht 1.791 m (5' 10.5\")   Wt 70.3 kg (155 lb)   BMI 21.93 kg/m     [place]  Abd soft, NT, no distention or guarding, +BS  4-5 cm firm mass between rectum and scrotum, with 3 small open areas and white material present  Very tender, fluctuant  Ext without edema  Neuro: limited history, ambulatory without device, needs repetitive cuing     Psych: affect okay, pleasant  Cooperative with exam      Last Comprehensive Metabolic Panel:  Sodium   Date Value Ref Range Status   01/10/2023 140 136 - 145 mmol/L Final     Potassium   Date Value Ref Range Status   01/10/2023 4.0 3.4 - 5.3 mmol/L Final     Carbon Dioxide (CO2)   Date Value Ref Range Status   01/10/2023 21 (L) 22 - 29 mmol/L Final     Glucose   Date Value Ref Range Status   01/10/2023 77 70 - 99 mg/dL Final     Urea Nitrogen   Date Value Ref Range Status   01/10/2023 14.6 8.0 - 23.0 mg/dL Final     Creatinine   Date Value Ref Range Status   01/10/2023 0.81 0.67 - 1.17 mg/dL Final     GFR Estimate   Date Value Ref Range Status   01/10/2023 86 >60 mL/min/1.73m2 Final     Calcium   Date Value Ref Range Status   01/10/2023 9.2 8.8 - 10.2 mg/dL Final     Lab Results "   Component Value Date    WBC 8.5 01/10/2023      HGB 12.0 01/10/2023       01/10/2023       01/10/2023     ECHO 2019:     Left Ventricle: Biplane Ejection Fraction visually appears to be borderline low normal around 50%. Normal cavity size and wall thickness.   Normal left ventricular diastolic function.     Left Atrium: Normal cavity size. Negative bubble study     Aortic Valve: Aortic valve not well visualized. The valve is mildly sclerotic. No stenosis. Trace regurgitation is present.     Right Ventricle: Normal cavity size and right ventricular function.     Pericardium: Pericardium is normal. There is no pericardial effusion.    CT ABDOMEN PELVIS W/O and W CONTRAST 8/28/2022   INDICATION: Hematuria, hx of AAA, hx of stones  LOWER CHEST: Severe emphysematous changes in the lung bases are again noted. There are coronary artery aortic atherosclerotic calcifications. Visualized portions of the lower chest are otherwise unremarkable.  KIDNEYS/BLADDER: There is a nonobstructive left renal pelvic calculus measuring approximately 0.5 x 0.3 x 0.4 cm. Kidneys otherwise enhance normally. No hydronephrosis, hydroureter, or ureteral calculus is identified. Urinary bladder is grossly normal in appearance.  BOWEL: There are scattered diverticula in the colon without pericolonic inflammatory change to suggest acute diverticulitis. Colon is otherwise of normal caliber and appearance. Cecum is flipped anteriorly and medially and superiorly as compared to the expected location with a hypermobile cecum/cecal bascule. No evidence for obstruction is seen. The appendix is not definitely identified. No pericecal inflammatory change to suggest acute appendicitis. Small bowel is grossly of normal caliber and   appearance. The stomach contains a small amount of air and fluid, but is otherwise unremarkable. Gastric wall thickening is likely due to incomplete distention. There is a small hiatal hernia containing portion of  the gastric cardia.  LYMPH NODES: No lymphadenopathy is identified.  VASCULATURE: There is atherosclerosis. There is aneurysmal dilatation of the infrarenal abdominal aorta measuring up to 3.3 x 3.1 cm in cross-section, similar to prior study. There is mild aneurysmal dilatation of the left iliac artery measuring up to 2.0 cm in greatest cross-sectional dimension and the right common iliac artery measuring up to 2.2 cm in greatest cross-sectional dimension. No dissection is seen.  PELVIC ORGANS: Multiple radiation treatment seeds are seen in the prostate gland. Seminal vesicles are unremarkable   MUSCULOSKELETAL: There are degenerative changes in the hips and SI joints, and spine. There is suggestion of demineralization. No aggressive osseous lesions or acute osseous fractures. Central superior endplate compression of the L3 vertebral body is stable since at least 6/10/2020. Anterior superior endplate compression of the T12 vertebral body is also stable since at least 6/10/2020.                                                        IMPRESSION:   1.  Nonobstructive left renal pelvic calculus could be causing intermittent obstruction and could cause hematuria.  2.  No obstructive urinary system process is identified. No ureteral calculus is seen.  3.  Atherosclerosis including the coronary arteries as well as aneurysmal dilatation of the infrarenal abdominal aorta measures up to 3.3 x 3.1 cm in maximum cross-sectional dimension. There is mild aneurysmal dilatation of the common iliac arteries measuring up to greatest cross-sectional dimension of 2.0 cm on the left and 2.2 cm on the right.  4.  Mild colonic diverticulosis without evidence for acute diverticulitis.  5.  Severe emphysematous changes of the lung bases.    CT HEAD W/O CONTRAST 8/28/2022   INTRACRANIAL CONTENTS: No intracranial hemorrhage, extraaxial collection, or mass effect.  No CT evidence of acute infarct. There is scattered diffuse low attenuation  within the periventricular and subcortical white matter consistent with diffuse small vessel ischemic disease. The ventricular system, basal cisterns and the cortical sulci are consistent with diffuse volume loss. This is more prominent involving the anterior temporal lobes.                                                               IMPRESSION:  1.  No CT finding of a mass, hemorrhage or focal area suggestive of infarct.  2.  Stable diffuse age related changes.      IMP/PLAN:   (L02.215) Abscess of perineum   Comment: cellulitis has improved with cephalexin, but still localized infection and starting to drain    Plan: warm packs 4-6x/ day, will need to drain    Ceftriaxone 1gm IM today and tomorrow, then resume cephalexin on Saturday    [facility] nursing to help with wound care.      (J44.9) Chronic obstructive pulmonary disease, unspecified COPD type (H)  Comment: significant smoking history   Plan: Breo Ellipta daily, prn albuterol MDI for rescue     Duonebs have been helpful, but not able to get secondary to shortage.    Start Combivent MDI qid          (I25.10) Coronary artery disease involving native coronary artery of native heart without angina pectoris    Comment: h/o IMI     Plan: atorvastatin 40 mg/day for secondary prevention       (I48.11) Longstanding persistent atrial fibrillation (H)  Comment:   Pulse Readings from Last 4 Encounters:   02/08/23 82   01/04/23 83   11/09/22 84   11/02/22 89      Plan: metoprolol ER 50 mg/day for VR control   Rivaroxaban 20 mg/day for stroke prophylaxis>>>given weight loss, will decrease dose to 15 mg/day at next refill.      (F03.90) Dementia without behavioral disturbance, unspecified dementia type (H)  Comment: GI side effects of diarrhea with urgency on higher dose of donepezil   Plan: [facility] for assist with med admin, meals, activity   Donepezil 5 mg/day       (I71.4) Abdominal aortic aneurysm, without rupture (H)  Comment: 3.3 cm by CT in August 2022  Plan: no  further surveillance as long as he is asx     (C61) Malignant neoplasm of prostate (H)  Comment: had brachytherapy in 2010     PSA <0.01 in Sept 2022    Plan: follow     (K21.9) Gastroesophageal reflux disease, unspecified whether esophagitis present  Comment: h/o Schatzki's ring    Plan: famotidine 10 mg/day      Sarah Suresh MD         Documentation of Face-to-Face and Certification for Home Health Services     Patient: William Ba   YOB: 1935  MR Number: [id number]  Today's Date: 2/8/2023    I certify that patient: William Ba is under my care and that I, or a nurse practitioner or physician's assistant working with me, had a face-to-face encounter that meets the physician face-to-face encounter requirements with this patient on: February 8, 2023.    This encounter with the patient was in whole, or in part, for the following medical condition, which is the primary reason for home health care: abscess of perineum.    I certify that, based on my findings, the following services are medically necessary home health services: Nursing.    My clinical findings support the need for the above services because: Nurse is needed: To provide caregiver training to assist with: wound care. and close monitoring of abscess.    Further, I certify that my clinical findings support that this patient is homebound (i.e. absences from home require considerable and taxing effort and are for medical reasons or [facility] services or infrequently or of short duration when for other reasons) because: Leaving home is medically contraindicated for the following reason(s): Dyspnea on exertion that makes it so they cannot leave their home for needed services without clinical deterioration. and Pressure on open wounds during transport impairs healing process...    Based on the above findings. I certify that this patient is confined to the home and needs intermittent skilled nursing care, physical therapy and/or speech  therapy.  The patient is under my care, and I have initiated the establishment of the plan of care.  This patient will be followed by a physician who will periodically review the plan of care.  Physician/Provider to provide follow up care: Abraham Velez    Responsible Medicare certified PECOS Physician: Sarah Suresh MD   Physician Signature: See electronic signature associated with these discharge orders.  Date: 2/8/2023

## 2023-04-17 NOTE — ED PROVIDER NOTES
History   Chief Complaint:  Altered Mental Status and Cough     The history is provided by a relative.      William Ba is a 87 year old male DNR on Xarelto with a history of dementia, CVA, Afib and COPD who presents with altered mentation and cough. Patient's daughter reports that the patient has had a tooth ache over the past week and she was going to bring the patient to a dental appointment, but when she arrived to his memory care facility, staff told her that this morning the patient had a change in mentation, as well as a new cough. She notes that he did not have any fevers. She states that the patient has chest pain when he coughs, but not at rest. She adds that his mental status is improving and he seems to be at baseline now. She reports that he frequently gets pneumonia and that his O2 sats were high 80s this morning. She saw him yesterday and notes that the patient was congested, but otherwise his baseline. Patient denies chest pain, shortness of breath and abdominal pain.  He has been receiving his medications as prescribed.    Independent Historian:   Daughter - They report above history.    Review of External Notes: Outpatient note 1/4/2023 reviewed.  A new cough was reported at that time as well.  Augmentin prescribed.    ROS:  Review of Systems   Constitutional: Positive for appetite change. Negative for fever.   HENT: Positive for dental problem.    Respiratory: Positive for cough.    Cardiovascular: Negative for chest pain.   Gastrointestinal: Negative for abdominal pain.   Psychiatric/Behavioral: Positive for confusion (improving).   All other systems reviewed and are negative.    Allergies:  Bee  Flu Virus Vaccine  Methylprednisolone     Medications:    Albuterol inhaler  Atorvastatin  Donepezil   Epipen  Famotidine  Breo ellipta  Duoneb  Metoprolol succinate ER  Xarelto    Past Medical History:    AAA without rupture  CVA  Afib  Aflutter  COPD  Coronary atherosclerosis  Hyperlipidemia  "  Pneumothorax   Hiatal hernia  Hepatitis  Prostate cancer  Schatzki's ring  Inguinal hernia  Vitamin D deficiency     Past Surgical History:    Hernia repair   Total knee replacement     Family History:    Father - AAA  Mother - heart disease    Social History:  Presents with daughter, Minerva  PCP: Abraham Velez     Physical Exam     Patient Vitals for the past 24 hrs:   BP Temp Temp src Pulse Resp SpO2 Height Weight   04/17/23 1024 (!) 117/94 97.6  F (36.4  C) Oral -- 18 96 % -- --   04/17/23 0852 114/66 98  F (36.7  C) Temporal 90 -- 94 % 1.854 m (6' 1\") 72.6 kg (160 lb)        Physical Exam  General: Elderly male sitting upright  Eyes: PERRL, Conjunctive within normal limits  ENT: Moist mucous membranes, oropharynx clear.   CV: Normal S1S2, no murmur, rub or gallop. Regular rate and rhythm  Resp: Good air movement bilaterally.  No wheezes currently appreciated.  Diminished at the bases bilaterally.  Normal respiratory effort.  GI: Abdomen is soft, nontender and nondistended.   MSK: Nontender. Normal active range of motion.  Skin: Warm and dry. No rashes or lesions or ecchymoses on visible skin.  Neuro: Alert to person.  Responds appropriately to all questions and commands but unable to give details of history. No focal findings appreciated.   Psych: Flat affect.    Emergency Department Course   ECG  ECG results from 04/17/23 @ 0919   EKG 12 lead     Value    Systolic Blood Pressure     Diastolic Blood Pressure     Ventricular Rate 92    Atrial Rate 256    NV Interval     QRS Duration 92        QTc 442    P Axis     R AXIS 39    T Axis -7    Interpretation ECG      Atrial flutter with variable A-V block  Nonspecific ST abnormality  Read by me @ 1002       Imaging:  Chest XR,  PA & LAT   Final Result   IMPRESSION: Since June 14, 2021, heart size is normal. Patchy   opacities in the right lung, more so than left lung, nonspecific and   subtle. Greatest degree of opacities in the right lower lung, " somewhat   concerning for pneumonia given appearance, clinical correlation   necessary. Otherwise, atypical pneumonia with faint bilateral   ground-glass opacities more apparent than previous exam. No obvious   pleural effusion and no pneumothorax.      DUONG LEW MD            SYSTEM ID:  [id number]         Report per radiology    Laboratory:  Labs Ordered and Resulted from Time of ED Arrival to Time of ED Departure   BASIC METABOLIC PANEL - Abnormal       Result Value    Sodium 138      Potassium 4.3      Chloride 102      Carbon Dioxide (CO2) 24      Anion Gap 12      Urea Nitrogen 18.0      Creatinine 0.84      Calcium 9.1      Glucose 117 (*)     GFR Estimate 85     CBC WITH PLATELETS AND DIFFERENTIAL - Abnormal    WBC Count 15.0 (*)     RBC Count 4.10 (*)     Hemoglobin 13.0 (*)     Hematocrit 40.3      MCV 98      MCH 31.7      MCHC 32.3      RDW 14.6      Platelet Count 143 (*)     % Neutrophils 74      % Lymphocytes 18      % Monocytes 8      % Eosinophils 0      % Basophils 0      % Immature Granulocytes 0      NRBCs per 100 WBC 0      Absolute Neutrophils 11.0 (*)     Absolute Lymphocytes 2.7      Absolute Monocytes 1.2      Absolute Eosinophils 0.0      Absolute Basophils 0.0      Absolute Immature Granulocytes 0.1      Absolute NRBCs 0.0     URINE MACROSCOPIC WITH REFLEX TO MICRO - Abnormal    Color Urine Yellow      Appearance Urine Clear      Glucose Urine Negative      Bilirubin Urine Negative      Ketones Urine Negative      Specific Gravity Urine 1.025      Blood Urine Moderate (*)     pH Urine 7.0      Protein Albumin Urine 20 (*)     Urobilinogen Urine 8.0 (*)     Nitrite Urine Negative      Leukocyte Esterase Urine Negative      RBC Urine 52 (*)     WBC Urine 2      Squamous Epithelials Urine <1      Mucus Urine Present (*)     Hyaline Casts Urine 2     TROPONIN T, HIGH SENSITIVITY - Normal    Troponin T, High Sensitivity 21     INFLUENZA A/B, RSV, & SARS-COV2 PCR - Normal    Influenza A PCR  Negative      Influenza B PCR Negative      RSV PCR Negative      SARS CoV2 PCR Negative        Emergency Department Course & Assessments:     Assessments:  0954 I obtained history and examined the patient, as above.  1235 I rechecked the patient and updated them on findings. Per daughter, patient's urine has been darker and there is more leakage. She is concerned for a UTI. She adds that the patient has been mentating appropriately durine than his baseling his time in the ED with some intermittent coughing.  1321 I rechecked the patient and updated them on findings. Amenable and appropriate for discharge.     Independent Interpretation (X-rays, CTs, rhythm strip):  I reviewed patient's chest xray, which shows right lower lung infiltrate.    Consultations/Discussion of Management or Tests:  None     Social Determinants of Health affecting care:   None    Disposition:  The patient was discharged to home.     Impression & Plan    Medical Decision Making:  William Ba is a 87 year old male who presents for evaluation of confusion and cough.  Confusion seems to have returned to baseline in the setting of dementia.  This is per his daughter's report who knows him well.  Assessment here is reassuring overall.  He does have evidence of possible infiltrate in his right lower lung and I suspect may be having nighttime aspiration versus aspiration with eating.  The former may explain waking up with a cough.  There are no signs of complications of pneumonia at this point such as septic shock, bacteremia, empyema, hypoxia, respiratory failure or compromise.  It appears as though on chart review he has had pneumonia suspected in the past.  He may benefit from swallow study or sleep study.  Given that he is not hypoxic or any respiratory distress and has mild findings on x-ray, supportive outpatient management is  indicated.  I will not therefore hospitalize for further cares or IV antibiotics.    I doubt PE, dissection, acute  coronary syndrome, or other worrisome etiology for patients symptoms. Patient will follow-up with primary care physician regardless of disease course within 2 days.  Augmentin prescribed.  Signs for return visit to ED were discussed with patient and pneumonia precautions given for home.  Daughter felt comfortable with the plan.  All questions were answered.    Diagnosis:    ICD-10-CM    1. Pneumonia due to infectious organism, unspecified laterality, unspecified part of lung  J18.9     possible aspiration           Discharge Medications:  New Prescriptions    AMOXICILLIN-CLAVULANATE (AUGMENTIN) 875-125 MG TABLET    Take 1 tablet by mouth 2 times daily for 7 days      Scribe Disclosure:  Damaris LUGO, am serving as a scribe at 9:55 AM on 4/17/2023 to document services personally performed by Trina Mark MD based on my observations and the provider's statements to me.        Trina Mark MD  04/18/23 [id number]

## 2023-04-17 NOTE — ED TRIAGE NOTES
Pt arrives with daughter for cough and shortness of breath. Pt has history of COPD, dementia, a-fib (on xarelto). Was at a dentist appt when staff told daughter that pt woke up disoriented and coughing. Gave a neb treatment prior to having pt transported to ED.

## 2023-04-17 NOTE — DISCHARGE INSTRUCTIONS
Clinically there is some concern for possible recurrent aspiration (swallow or sleep related). A swallow or sleep study may be helpful if thought clinically indicated.

## 2023-04-17 NOTE — ED NOTES
Discharge to facility with daughter transporting. Script for discharge medications sent to pharmacy of choice. Pt and family did not have any questions or concerns noted upon departure.

## 2023-04-19 NOTE — LETTER
4/19/2023        RE: William Felipe  [address] [facility] [address]        [place] [facility]  PRIMARY CARE PROVIDER AND CLINIC:  EYAD Diaz CNP, [address] [facility] / [facility] [address]  Chief Complaint   Patient presents with     Hospital F/U     [place] Medical Record Number:  [id number]  Place of Service where encounter took place:  [facility] (shelter) [233]    William Ba  is a 87 year old  (1935), re-admitted to the above facility from  [facility]. Hospital stay 4/17/23 - 4 hours.. .  Admitted to this facility for  medical management and nursing care.    HPI:    HPI information obtained from: facility chart records, facility staff, patient report and [facility] chart review.   Brief Summary of Hospital Course:   Pneumonia: 4/17/23 had O2 sats upper 80s, cough, increased confusion. Has h/o pneumonia. CXR showed possible RLL pneumonia. dc'd on augmentin. O2 sats stable.     COPD: cough, no current wheezing. Cont. On Breo, combivent, alb. MDI. Per staff, had duonebs which were more effective, however duonebs not currently available at pharmacy facility uses. dtr would like to restart dounebs-refill at alternative pharm.    Dementia. Some recent increased confusion. Likely r/t#1. UA at ED neg. Cont. On aricept. Cog status baseline today.       Updates on Status Since Skilled nursing Admission:    CODE STATUS/ADVANCE DIRECTIVES DISCUSSION:   DNR / DNI  Patient's living condition: lives in an assisted living facility  ALLERGIES: Bee, Flu virus vaccine, and Methylprednisolone  PAST MEDICAL HISTORY:  has a past medical history of Abdominal aortic aneurysm without rupture (H) (2/25/2016), Acute CVA (cerebrovascular accident) (H) (3/28/2019), Atrial fibrillation/flutter, Atrial fibrillation/flutter (11/13/2019), COPD (chronic obstructive pulmonary disease), Coronary atherosclerosis (1/29/2015), and Mixed  hyperlipidemia (11/8/2010).  PAST SURGICAL HISTORY:   has a past surgical history that includes Replacement Total Knee; hernia repair; and colonoscopy.  FAMILY HISTORY: family history includes Abdominal Aortic Aneurysm in his father; Aneurysm in his father; Colon Cancer in his brother; Heart Disease in his mother.  SOCIAL HISTORY:   reports that he quit smoking about 21 years ago. He smoked an average of 1 pack per day. He has never used smokeless tobacco. He reports current alcohol use.    Post Discharge Medication Reconciliation Status: discharge medications reconciled and changed, per note/orders    Current Outpatient Medications   Medication Sig Dispense Refill     ipratropium - albuterol 0.5 mg/2.5 mg/3 mL (DUONEB) 0.5-2.5 (3) MG/3ML neb solution Take 1 vial (3 mLs) by nebulization 3 times daily as needed for shortness of breath, wheezing or cough 90 mL 11     albuterol (PROAIR HFA/PROVENTIL HFA/VENTOLIN HFA) 108 (90 Base) MCG/ACT inhaler Inhale 2 puffs into the lungs every 4 hours as needed for shortness of breath / dyspnea or wheezing       amoxicillin-clavulanate (AUGMENTIN) 875-125 MG tablet Take 1 tablet by mouth 2 times daily 14 tablet 0     atorvastatin (LIPITOR) 40 MG tablet Take 1 tablet (40 mg) by mouth At Bedtime 90 tablet 3     diclofenac (VOLTAREN) 1 % topical gel Apply 2 g topically 2 times daily       donepezil (ARICEPT) 5 MG tablet Take 5 mg by mouth At Bedtime       EPINEPHrine (ANY BX GENERIC EQUIV) 0.3 MG/0.3ML injection 2-pack Inject 0.3 mLs (0.3 mg) into the muscle as needed for anaphylaxis 1 each 3     famotidine (PEPCID) 10 MG tablet Take 10 mg by mouth every evening       fluticasone-vilanterol (BREO ELLIPTA) 100-25 MCG/INH inhaler Inhale 1 puff into the lungs daily 1 Inhaler 11     metoprolol succinate ER (TOPROL-XL) 50 MG 24 hr tablet Take 1 tablet (50 mg) by mouth daily 90 tablet 3     Misc Natural Products (OSTEO BI-FLEX ADV TRIPLE ST) TABS Take 1 capsule by mouth 2 times daily         Multiple Vitamins-Minerals (MULTIVITAMIN ADULT PO) Take one(1) tablet daily.       rivaroxaban ANTICOAGULANT (XARELTO) 20 MG TABS tablet Take 1 tablet (20 mg) by mouth daily (with dinner) 90 tablet 3         ROS:  Limited secondary to cognitive impairment but today pt reports ongoing cough    Vitals:  /79   Pulse 88   Temp 98  F (36.7  C)   Resp 20   Wt 72.6 kg (160 lb)   SpO2 92%   BMI 21.11 kg/m    Exam:  GENERAL APPEARANCE:  Alert, in no distress  ENT:  Mouth and posterior oropharynx normal, moist mucous membranes, [place], no rhinorrhea  EYES:  EOM, conjunctivae, lids, pupils and irises normal, PERRL  RESP:  diminished breath sounds bibasilar. no wheezing. dry cough. breathing non-labored  CV:  Palpation and auscultation of heart done , no edema, irregular rhythm sl irreg, rate-normal  ABDOMEN:  normal bowel sounds, soft, nontender, no hepatosplenomegaly or other masses, no guarding or rebound  M/S:   Gait and station normal  muscle strength 5/5 all 4 ext, normal tone  NEURO:   Cranial nerves 2-12 are normal tested and grossly at patient's baseline, no tremor  PSYCH:  insight and judgement impaired, memory impaired , affect and mood normal    Lab/Diagnostic data:    Most Recent 3 CBC's:Recent Labs   Lab Test 04/17/23  0903 01/10/23  1527 09/13/22  0651 08/28/22  1728   WBC 15.0* 8.5  --  8.0   HGB 13.0* 12.0* 12.4* 11.6*   MCV 98 102*  --  102*   * 247  --  135*     Most Recent 3 BMP's:Recent Labs   Lab Test 04/17/23  0903 01/10/23  1527 08/28/22  1728    140 139   POTASSIUM 4.3 4.0 4.5   CHLORIDE 102 105 107   CO2 24 21* 25   BUN 18.0 14.6 23.3*   CR 0.84 0.81 0.77   ANIONGAP 12 14 7   NIEVES 9.1 9.2 8.8   * 77 99       ASSESSMENT/PLAN:  (J18.9) Pneumonia of right lower lobe due to infectious organism  (primary encounter diagnosis)  Comment: afebrile. Wbc elevated.  Plan: 1. Complete augmentin course  2. Encourage act. Level as yola  3. Follow O2 sats, temp    (J44.9) Chronic  obstructive pulmonary disease, unspecified COPD type (H)  Comment: ongoing cough.  Plan: ipratropium - albuterol 0.5 mg/2.5 mg/3 mL         (DUONEB) 0.5-2.5 (3) MG/3ML neb solution        2. Cont. Breo, combivent  3. Cont. Prn alb MDI for now  4. For wheezing, sob, may sched. 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 Status baseline today  Plan: 1. Cont. aricept  2. Cont. Hs melatonin, monitor for insomnia  3. Monitor for anxiety      Electronically signed by:  EYAD Diaz CNP                         Sincerely,        EYAD Diaz CNP

## 2023-04-19 NOTE — PROGRESS NOTES
[place] [facility]  PRIMARY CARE PROVIDER AND CLINIC:  Abraham Velez, APRN CNP, [address] [facility] / [facility] [address]  Chief Complaint   Patient presents with     Hospital F/U     [place] Medical Record Number:  [id number]  Place of Service where encounter took place:  [facility] ([facility]) [233]    William Ba  is a 87 year old  (1935), re-admitted to the above facility from  [facility]. Hospital stay 4/17/23 - 4 hours.. .  Admitted to this facility for  medical management and nursing care.    HPI:    HPI information obtained from: facility chart records, facility staff, patient report and [facility] chart review.   Brief Summary of Hospital Course:   Pneumonia: 4/17/23 had O2 sats upper 80s, cough, increased confusion. Has h/o pneumonia. CXR showed possible RLL pneumonia. dc'd on augmentin. O2 sats stable.     COPD: cough, no current wheezing. Cont. On Breo, combivent, alb. MDI. Per staff, had duonebs which were more effective, however duonebs not currently available at pharmacy facility uses. dtr would like to restart dounebs-refill at alternative pharm.    Dementia. Some recent increased confusion. Likely r/t#1. UA at ED neg. Cont. On aricept. Cog status baseline today.       Updates on Status Since Skilled nursing Admission:    CODE STATUS/ADVANCE DIRECTIVES DISCUSSION:   DNR / DNI  Patient's living condition: lives in an assisted living facility  ALLERGIES: Bee, Flu virus vaccine, and Methylprednisolone  PAST MEDICAL HISTORY:  has a past medical history of Abdominal aortic aneurysm without rupture (H) (2/25/2016), Acute CVA (cerebrovascular accident) (H) (3/28/2019), Atrial fibrillation/flutter, Atrial fibrillation/flutter (11/13/2019), COPD (chronic obstructive pulmonary disease), Coronary atherosclerosis (1/29/2015), and Mixed hyperlipidemia (11/8/2010).  PAST SURGICAL HISTORY:   has a past surgical history that includes Replacement Total  Knee; hernia repair; and colonoscopy.  FAMILY HISTORY: family history includes Abdominal Aortic Aneurysm in his father; Aneurysm in his father; Colon Cancer in his brother; Heart Disease in his mother.  SOCIAL HISTORY:   reports that he quit smoking about 21 years ago. He smoked an average of 1 pack per day. He has never used smokeless tobacco. He reports current alcohol use.    Post Discharge Medication Reconciliation Status: discharge medications reconciled and changed, per note/orders    Current Outpatient Medications   Medication Sig Dispense Refill     ipratropium - albuterol 0.5 mg/2.5 mg/3 mL (DUONEB) 0.5-2.5 (3) MG/3ML neb solution Take 1 vial (3 mLs) by nebulization 3 times daily as needed for shortness of breath, wheezing or cough 90 mL 11     albuterol (PROAIR HFA/PROVENTIL HFA/VENTOLIN HFA) 108 (90 Base) MCG/ACT inhaler Inhale 2 puffs into the lungs every 4 hours as needed for shortness of breath / dyspnea or wheezing       amoxicillin-clavulanate (AUGMENTIN) 875-125 MG tablet Take 1 tablet by mouth 2 times daily 14 tablet 0     atorvastatin (LIPITOR) 40 MG tablet Take 1 tablet (40 mg) by mouth At Bedtime 90 tablet 3     diclofenac (VOLTAREN) 1 % topical gel Apply 2 g topically 2 times daily       donepezil (ARICEPT) 5 MG tablet Take 5 mg by mouth At Bedtime       EPINEPHrine (ANY BX GENERIC EQUIV) 0.3 MG/0.3ML injection 2-pack Inject 0.3 mLs (0.3 mg) into the muscle as needed for anaphylaxis 1 each 3     famotidine (PEPCID) 10 MG tablet Take 10 mg by mouth every evening       fluticasone-vilanterol (BREO ELLIPTA) 100-25 MCG/INH inhaler Inhale 1 puff into the lungs daily 1 Inhaler 11     metoprolol succinate ER (TOPROL-XL) 50 MG 24 hr tablet Take 1 tablet (50 mg) by mouth daily 90 tablet 3     Misc Natural Products (OSTEO BI-FLEX ADV TRIPLE ST) TABS Take 1 capsule by mouth 2 times daily        Multiple Vitamins-Minerals (MULTIVITAMIN ADULT PO) Take one(1) tablet daily.       rivaroxaban ANTICOAGULANT  (XARELTO) 20 MG TABS tablet Take 1 tablet (20 mg) by mouth daily (with dinner) 90 tablet 3         ROS:  Limited secondary to cognitive impairment but today pt reports ongoing cough    Vitals:  /79   Pulse 88   Temp 98  F (36.7  C)   Resp 20   Wt 72.6 kg (160 lb)   SpO2 92%   BMI 21.11 kg/m    Exam:  GENERAL APPEARANCE:  Alert, in no distress  ENT:  Mouth and posterior oropharynx normal, moist mucous membranes, [place], no rhinorrhea  EYES:  EOM, conjunctivae, lids, pupils and irises normal, PERRL  RESP:  diminished breath sounds bibasilar. no wheezing. dry cough. breathing non-labored  CV:  Palpation and auscultation of heart done , no edema, irregular rhythm sl irreg, rate-normal  ABDOMEN:  normal bowel sounds, soft, nontender, no hepatosplenomegaly or other masses, no guarding or rebound  M/S:   Gait and station normal  muscle strength 5/5 all 4 ext, normal tone  NEURO:   Cranial nerves 2-12 are normal tested and grossly at patient's baseline, no tremor  PSYCH:  insight and judgement impaired, memory impaired , affect and mood normal    Lab/Diagnostic data:    Most Recent 3 CBC's:Recent Labs   Lab Test 04/17/23  0903 01/10/23  1527 09/13/22  0651 08/28/22  1728   WBC 15.0* 8.5  --  8.0   HGB 13.0* 12.0* 12.4* 11.6*   MCV 98 102*  --  102*   * 247  --  135*     Most Recent 3 BMP's:Recent Labs   Lab Test 04/17/23  0903 01/10/23  1527 08/28/22  1728    140 139   POTASSIUM 4.3 4.0 4.5   CHLORIDE 102 105 107   CO2 24 21* 25   BUN 18.0 14.6 23.3*   CR 0.84 0.81 0.77   ANIONGAP 12 14 7   NIEVES 9.1 9.2 8.8   * 77 99       ASSESSMENT/PLAN:  (J18.9) Pneumonia of right lower lobe due to infectious organism  (primary encounter diagnosis)  Comment: afebrile. Wbc elevated.  Plan: 1. Complete augmentin course  2. Encourage act. Level as yola  3. Follow O2 sats, temp    (J44.9) Chronic obstructive pulmonary disease, unspecified COPD type (H)  Comment: ongoing cough.  Plan: ipratropium - albuterol 0.5  mg/2.5 mg/3 mL         (DUONEB) 0.5-2.5 (3) MG/3ML neb solution        2. Cont. Breo, combivent  3. Cont. Prn alb MDI for now  4. For wheezing, sob, may sched. 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 Status baseline today  Plan: 1. Cont. aricept  2. Cont. Hs melatonin, monitor for insomnia  3. Monitor for anxiety      Electronically signed by:  EYAD Diaz CNP

## 2023-05-10 NOTE — LETTER
5/10/2023        RE: William Felipe  [address] [facility] [address]        [place] [facility]  [place] Medical Record Number:  [id number]  Place of Service where encounter took place:  [facility] ([facility]) [233]  Chief Complaint   Patient presents with     Cough       HPI:    William Ba  is a 87 year old (1935), who is being seen today for an episodic care visit.  HPI information obtained from: facility chart records, facility staff, patient report, [facility] chart review and family/first contact dtr report. Today's concern is: cough copd, a-fib. 4/17/23 hosp. Visit for RLL pneumonia. tx with augmentin. Cough has improved. Afebrile. Act. Level back to baseline.  For copd cont. On Breo, combivent, alb. No recent wheezing. No recent reports of SOB. O2 sats stable. For a-fib cont. On xarelto, metoprolol. Had h/o CVA listed on problem list from episode of Neuos changes in 2019, however was not CVA per note-dtr requests dx be removed from problem list.       Past Medical and Surgical History reviewed in Epic today.    MEDICATIONS:    Current Outpatient Medications   Medication Sig Dispense Refill     albuterol (PROAIR HFA/PROVENTIL HFA/VENTOLIN HFA) 108 (90 Base) MCG/ACT inhaler Inhale 2 puffs into the lungs every 4 hours as needed for shortness of breath / dyspnea or wheezing       atorvastatin (LIPITOR) 40 MG tablet Take 1 tablet (40 mg) by mouth At Bedtime 90 tablet 3     diclofenac (VOLTAREN) 1 % topical gel Apply 2 g topically 2 times daily       donepezil (ARICEPT) 5 MG tablet Take 5 mg by mouth At Bedtime       EPINEPHrine (ANY BX GENERIC EQUIV) 0.3 MG/0.3ML injection 2-pack Inject 0.3 mLs (0.3 mg) into the muscle as needed for anaphylaxis 1 each 3     famotidine (PEPCID) 10 MG tablet Take 10 mg by mouth every evening       fluticasone-vilanterol (BREO ELLIPTA) 100-25 MCG/INH inhaler Inhale 1 puff into the lungs daily 1 Inhaler 11     ipratropium -  albuterol 0.5 mg/2.5 mg/3 mL (DUONEB) 0.5-2.5 (3) MG/3ML neb solution Take 1 vial (3 mLs) by nebulization 3 times daily as needed for shortness of breath, wheezing or cough 90 mL 11     metoprolol succinate ER (TOPROL-XL) 50 MG 24 hr tablet Take 1 tablet (50 mg) by mouth daily 90 tablet 3     Misc Natural Products (OSTEO BI-FLEX ADV TRIPLE ST) TABS Take 1 capsule by mouth 2 times daily        Multiple Vitamins-Minerals (MULTIVITAMIN ADULT PO) Take one(1) tablet daily.       rivaroxaban ANTICOAGULANT (XARELTO) 20 MG TABS tablet Take 1 tablet (20 mg) by mouth daily (with dinner) 90 tablet 3         REVIEW OF SYSTEMS:  Limited secondary to cognitive impairment but today pt reports feeling fine, occ cough, no sob.     Objective:  /78   Pulse 81   Resp 16   SpO2 96%   Exam:  GENERAL APPEARANCE:  Alert, in no distress  ENT:  Mouth and posterior oropharynx normal, moist mucous membranes, [place], no rhinorrhea  EYES:  EOM, conjunctivae, lids, pupils and irises normal, PERRL  RESP:  respiratory effort and palpation of chest normal, lungs clear to auscultation , no respiratory distress, no cough during exam no wheezing.  CV:  Palpation and auscultation of heart done , no edema, irregular rhythm : sl irreg, rate-normal  ABDOMEN:  normal bowel sounds, soft, nontender, no hepatosplenomegaly or other masses, no guarding or rebound  M/S:   Gait and station normal  muscle strength 5/5 all 4 ext.  NEURO:   Cranial nerves 2-12 are normal tested and grossly at patient's baseline, no tremor  PSYCH:  insight and judgement impaired, memory impaired , affect and mood normal    Labs:     Most Recent 3 CBC's:Recent Labs   Lab Test 04/17/23  0903 01/10/23  1527 09/13/22  0651 08/28/22  1728   WBC 15.0* 8.5  --  8.0   HGB 13.0* 12.0* 12.4* 11.6*   MCV 98 102*  --  102*   * 247  --  135*     Most Recent 3 BMP's:Recent Labs   Lab Test 04/17/23  0903 01/10/23  1527 08/28/22  1728    140 139   POTASSIUM 4.3 4.0 4.5   CHLORIDE  102 105 107   CO2 24 21* 25   BUN 18.0 14.6 23.3*   CR 0.84 0.81 0.77   ANIONGAP 12 14 7   NIEVES 9.1 9.2 8.8   * 77 99       ASSESSMENT/PLAN:  (R05.9) Cough, unspecified type  (primary encounter diagnosis)  Comment: mostly resolved. occ cough. No reports of fever, sob. S/p hosp. Visit, augmentin course for RLL pneumonia  Plan: 1. Follow temps, O2 sats  2. Cont. Prn mucinex  3. For increased coughing, may sched. mucinex    (J44.9) Chronic obstructive pulmonary disease, unspecified COPD type (H)  Comment: resp. Status stable. No current wheezing, act. Level baseline  Plan: 1. Cont. Breo  2. Cont. Combivent, prn alb. MDI  3. Monitor for wheezing, decrease in act. Level, reports of ACUNA    (I48.91) Atrial fibrillation, unspecified type (H)  Comment: HR stable  Plan: 1. Cont. xarelto  2. Cont. Metoprolol  3. Follow BPs, HRs  4. Bmp, cbc in next 1-3 mos    Electronically signed by:  EYAD Diaz CNP                 Sincerely,        EYAD Diaz CNP

## 2023-05-10 NOTE — PROGRESS NOTES
[place] [facility]  [place] Medical Record Number:  [id number]  [facility] [233[facility]  Chief Complaint   Patient presents with     Cough       HPI:    William Ba  is a 87 year old (1935), who is being seen today for an episodic care visit.  HPI information obtained from: facility chart records, facility staff, patient report, [facility] chart review and family/first contact dtr report. Today's concern is: cough copd, a-fib. 4/17/23 hosp. Visit for RLL pneumonia. tx with augmentin. Cough has improved. Afebrile. Act. Level back to baseline.  For copd cont. On Breo, combivent, alb. No recent wheezing. No recent reports of SOB. O2 sats stable. For a-fib cont. On xarelto, metoprolol. Had h/o CVA listed on problem list from episode of Neuos changes in 2019, however was not CVA per note-dtr requests dx be removed from problem list.       Past Medical and Surgical History reviewed in Epic today.    MEDICATIONS:    Current Outpatient Medications   Medication Sig Dispense Refill     albuterol (PROAIR HFA/PROVENTIL HFA/VENTOLIN HFA) 108 (90 Base) MCG/ACT inhaler Inhale 2 puffs into the lungs every 4 hours as needed for shortness of breath / dyspnea or wheezing       atorvastatin (LIPITOR) 40 MG tablet Take 1 tablet (40 mg) by mouth At Bedtime 90 tablet 3     diclofenac (VOLTAREN) 1 % topical gel Apply 2 g topically 2 times daily       donepezil (ARICEPT) 5 MG tablet Take 5 mg by mouth At Bedtime       EPINEPHrine (ANY BX GENERIC EQUIV) 0.3 MG/0.3ML injection 2-pack Inject 0.3 mLs (0.3 mg) into the muscle as needed for anaphylaxis 1 each 3     famotidine (PEPCID) 10 MG tablet Take 10 mg by mouth every evening       fluticasone-vilanterol (BREO ELLIPTA) 100-25 MCG/INH inhaler Inhale 1 puff into the lungs daily 1 Inhaler 11     ipratropium - albuterol 0.5 mg/2.5 mg/3 mL (DUONEB) 0.5-2.5 (3) MG/3ML neb solution Take 1 vial (3 mLs) by nebulization  3 times daily as needed for shortness of breath, wheezing or cough 90 mL 11     metoprolol succinate ER (TOPROL-XL) 50 MG 24 hr tablet Take 1 tablet (50 mg) by mouth daily 90 tablet 3     Misc Natural Products (OSTEO BI-FLEX ADV TRIPLE ST) TABS Take 1 capsule by mouth 2 times daily        Multiple Vitamins-Minerals (MULTIVITAMIN ADULT PO) Take one(1) tablet daily.       rivaroxaban ANTICOAGULANT (XARELTO) 20 MG TABS tablet Take 1 tablet (20 mg) by mouth daily (with dinner) 90 tablet 3         REVIEW OF SYSTEMS:  Limited secondary to cognitive impairment but today pt reports feeling fine, occ cough, no sob.     Objective:  /78   Pulse 81   Resp 16   SpO2 96%   Exam:  GENERAL APPEARANCE:  Alert, in no distress  ENT:  Mouth and posterior oropharynx normal, moist mucous membranes, [place], no rhinorrhea  EYES:  EOM, conjunctivae, lids, pupils and irises normal, PERRL  RESP:  respiratory effort and palpation of chest normal, lungs clear to auscultation , no respiratory distress, no cough during exam no wheezing.  CV:  Palpation and auscultation of heart done , no edema, irregular rhythm : sl irreg, rate-normal  ABDOMEN:  normal bowel sounds, soft, nontender, no hepatosplenomegaly or other masses, no guarding or rebound  M/S:   Gait and station normal  muscle strength 5/5 all 4 ext.  NEURO:   Cranial nerves 2-12 are normal tested and grossly at patient's baseline, no tremor  PSYCH:  insight and judgement impaired, memory impaired , affect and mood normal    Labs:     Most Recent 3 CBC's:Recent Labs   Lab Test 04/17/23  0903 01/10/23  1527 09/13/22  0651 08/28/22  1728   WBC 15.0* 8.5  --  8.0   HGB 13.0* 12.0* 12.4* 11.6*   MCV 98 102*  --  102*   * 247  --  135*     Most Recent 3 BMP's:Recent Labs   Lab Test 04/17/23  0903 01/10/23  1527 08/28/22  1728    140 139   POTASSIUM 4.3 4.0 4.5   CHLORIDE 102 105 107   CO2 24 21* 25   BUN 18.0 14.6 23.3*   CR 0.84 0.81 0.77   ANIONGAP 12 14 7   NIEVES 9.1 9.2 8.8    * 77 99       ASSESSMENT/PLAN:  (R05.9) Cough, unspecified type  (primary encounter diagnosis)  Comment: mostly resolved. occ cough. No reports of fever, sob. S/p hosp. Visit, augmentin course for RLL pneumonia  Plan: 1. Follow temps, O2 sats  2. Cont. Prn mucinex  3. For increased coughing, may sched. mucinex    (J44.9) Chronic obstructive pulmonary disease, unspecified COPD type (H)  Comment: resp. Status stable. No current wheezing, act. Level baseline  Plan: 1. Cont. Breo  2. Cont. Combivent, prn alb. MDI  3. Monitor for wheezing, decrease in act. Level, reports of ACUNA    (I48.91) Atrial fibrillation, unspecified type (H)  Comment: HR stable  Plan: 1. Cont. xarelto  2. Cont. Metoprolol  3. Follow BPs, HRs  4. Bmp, cbc in next 1-3 mos    Electronically signed by:  EYAD Diaz CNP

## 2023-06-07 PROBLEM — J18.9 PNEUMONIA: Status: ACTIVE | Noted: 2023-01-01

## 2023-06-07 NOTE — ED TRIAGE NOTES
BIBA, had pneumonia recently, today he has been feeling more tired and coughing more at his memory care facility, he comes in for further evaluation     Triage Assessment     Row Name 06/07/23 [id number]       Triage Assessment (Adult)    Airway WDL WDL       Respiratory WDL    Respiratory WDL WDL       Skin Circulation/Temperature WDL    Skin Circulation/Temperature WDL WDL       Cardiac WDL    Cardiac WDL WDL       Peripheral/Neurovascular WDL    Peripheral Neurovascular WDL WDL       Cognitive/Neuro/Behavioral WDL    Cognitive/Neuro/Behavioral WDL WDL

## 2023-06-07 NOTE — ED PROVIDER NOTES
History     Chief Complaint:  Cough and Fatigue       The history is limited by the condition of the patient.      William Ba is a 88 year old male with history of COPD, mixed hyperlipidemia, coronary atherosclerosis, and atrial fibrillation anticoagulated on Xarelto who presents to the ED via EMS from [facility] with a cough, shortness of breath, and hypoxia beginning suddenly around 3 pm today. Patient was feeling well today until he acutely declined this afternoon. Staff at his facility found that he was satting around 85% at this time. Upon EMS arrival he was satting at 92%. He currently denies any pains or other associated symptoms. No recent falls, per EMS. It is not known what his oxygen saturations are at baseline. Of note, patient typically gets pneumonia once a year.       Independent Historian:   EMS - They report additional history as noted above.  Daughter assists with history.  Review of External Notes:   Assisted living facility note May 10 for cough.      Medications:    Pro-air  Lipitor  Aricept  Pepcid   Breo Ellipta  Duoneb   Toprol   Xarelto     Past Medical History:    AAA without rupture   Atrial fibrillation/flutter  COPD  Coronary atherosclerosis   Mixed hyperlipidemia   Pneumothorax, left  CAP  Cataract of both eyes  Contracture of palmar fascia  Hiatal hernia  Unilateral inguinal hernia with obstruction, right   Impotence of organic origin   Prostate cancer  Hepatitis   Schatzki's ring  Presbylarynx  Vitamin D deficiency   SVT  Generalized osteoarthrosis   Herpes zoster   Hyperplasia of prostate  Renal stones   Acute CVA    Past Surgical History:    Colonoscopy, multiple  Inguinal hernia repair, right  TKA, right  EGD, multiple    Physical Exam     Patient Vitals for the past 24 hrs:   BP Temp Temp src Pulse Resp SpO2   06/07/23 1850 -- -- -- -- -- 95 %   06/07/23 1838 107/78 100.3  F (37.9  C) Rectal (!) 123 24 (!) 88 %        Physical Exam  GENERAL: Nontoxic, sitting with eyes  closed but follows all commands.   HEAD: Atraumatic.  Neck: No rigidity  CV: RRR, no murmurs rubs or gallops  PULM: Inspiratory and expiratory wheezing bilaterally; no retractions, rales, or rhonchi. Hacking cough, nonproductive.   ABD: Soft, nontender, nondistended, no guarding  DERM: Skin feels warm but no rash.   EXTREMITY: Moving all extremities without difficulty. No calf tenderness or peripheral edema  VASCULAR: Symmetric pulses bilaterally    Emergency Department Course     Imaging:  XR Chest 2 Views   Final Result   IMPRESSION: Since 04/17/2023, there has been a slight increase in infiltrate involving the right lung base. Otherwise the chest is stable to include hyperinflation of both lungs is stability of the mild scattered areas of infiltrate in both lungs.    Partially calcified thoracic aorta. No pneumothorax or active CHF seen.         Report per radiology    Laboratory:  Labs Ordered and Resulted from Time of ED Arrival to Time of ED Departure   ISTAT GASES LACTATE VENOUS POCT - Abnormal       Result Value    Lactic Acid POCT 1.0      Bicarbonate Venous POCT 22      O2 Sat, Venous POCT 90 (*)     pCO2 Venous POCT 29 (*)     pH Venous POCT 7.48 (*)     pO2 Venous POCT 52 (*)    COMPREHENSIVE METABOLIC PANEL - Abnormal    Sodium 136      Potassium 4.3      Chloride 102      Carbon Dioxide (CO2) 21 (*)     Anion Gap 13      Urea Nitrogen 19.3      Creatinine 0.84      Calcium 8.2 (*)     Glucose 111 (*)     Alkaline Phosphatase 83      AST 32      ALT 29      Protein Total 6.3 (*)     Albumin 3.3 (*)     Bilirubin Total 1.0      GFR Estimate 84     CBC WITH PLATELETS AND DIFFERENTIAL - Abnormal    WBC Count 10.0      RBC Count 3.77 (*)     Hemoglobin 11.7 (*)     Hematocrit 34.9 (*)     MCV 93      MCH 31.0      MCHC 33.5      RDW 13.6      Platelet Count 151      % Neutrophils 79      % Lymphocytes 11      % Monocytes 8      % Eosinophils 1      % Basophils 0      % Immature Granulocytes 1      NRBCs per  100 WBC 0      Absolute Neutrophils 8.0      Absolute Lymphocytes 1.1      Absolute Monocytes 0.8      Absolute Eosinophils 0.1      Absolute Basophils 0.0      Absolute Immature Granulocytes 0.1      Absolute NRBCs 0.0     LACTIC ACID WHOLE BLOOD - Normal    Lactic Acid 1.2     INFLUENZA A/B, RSV, & SARS-COV2 PCR - Normal    Influenza A PCR Negative      Influenza B PCR Negative      RSV PCR Negative      SARS CoV2 PCR Negative         Emergency Department Course & Assessments:    Interventions:  Medications   cefTRIAXone (ROCEPHIN) 1 g vial to attach to  mL bag for ADULTS or NS 50 mL bag for PEDS (0 g Intravenous Stopped 6/7/23 2014)   azithromycin 500 mg (ZITHROMAX) in 0.9% NaCl 250 mL intermittent infusion 500 mg (500 mg Intravenous $New Bag 6/7/23 2013)   albuterol (PROVENTIL HFA/VENTOLIN HFA) inhaler (2 puffs Inhalation $Given 6/7/23 1913)   predniSONE (DELTASONE) tablet 40 mg (40 mg Oral $Given 6/7/23 1914)   acetaminophen (TYLENOL) tablet 1,000 mg (1,000 mg Oral $Given 6/7/23 1915)   0.9% sodium chloride BOLUS (0 mLs Intravenous Stopped 6/7/23 2014)        Assessments:  1835 I obtained history and examined the patient as noted above.  2000 I rechecked the patient and explained findings.    Independent Interpretation (X-rays, CTs, rhythm strip):  I independently reviewed patient's chest XR and appreciate a hazy opacity in the RLL.     Consultations/Discussion of Management or Tests:  2020 I spoke with Dr. Mccloud, hospitalist, who accepts the patient.     Social Determinants of Health affecting care:   None    Disposition:  The patient was admitted to the hospital under the care of Dr. Mccloud.     Impression & Plan        Medical Decision Making:  Patient presenting for shortness of breath cough and hypoxia.  Chronic conditions complicating-dementia, recurrent pneumonia, COPD.  Differential diagnosis-consider pneumonia, COPD exacerbation, PE.  Clinical picture does not appear consistent with PE.  Labs only  showing mild anemia with hemoglobin 0.7 which is close to patient's baseline   Over the last few months.  After albuterol treatment, wheezing improved and patient coughing less.  Given p.o. steroids as part of COPD treatment.  Chest x-ray suggestive of pneumonia empirically given ceftriaxone and azithromycin IV.  Patient resting comfortably on repeat assessment.  Discussed with hospitalist who will admit patient.      Diagnosis:    ICD-10-CM    1. Pneumonia of right lung due to infectious organism, unspecified part of lung  J18.9       2. COPD exacerbation (H)  J44.1           Scribe Disclosure:  IKatelynn, am serving as a scribe at 6:35 PM on 6/7/2023 to document services personally performed by Mane Avalos MD based on my observations and the provider's statements to me.   6/7/2023   Mane Avalos MD Foss, Kevin, MD  06/07/23 5628

## 2023-06-08 PROBLEM — J18.9 PNEUMONIA DUE TO INFECTIOUS ORGANISM, UNSPECIFIED LATERALITY, UNSPECIFIED PART OF LUNG: Status: ACTIVE | Noted: 2023-01-01

## 2023-06-08 PROBLEM — R26.81 UNSTEADY GAIT: Status: ACTIVE | Noted: 2023-01-01

## 2023-06-08 NOTE — H&P
[facility]    History and Physical - Hospitalist Service       Date of Admission:  6/7/2023    Assessment & Plan      William Ba is a 88 year old male admitted on 6/7/2023.       Acute hypoxic respiratory failure  Bilateral pneumonia  Acute COPD exacerbation  Swallowing difficulty  Patient lives in the memory care unit had swallowing difficulty for many years which has progressively gotten worse  He was hypoxic with oxygen saturation decreasing to 78% in the group home where he lives with his wife along with a temperature of 100  F patient was diagnosed with pneumonia on chest x-ray  Oxygen is being given to keep saturations greater than 92%  Started on ceftriaxone and azithromycin antibiotics  We will check MRSA  Give prednisone 40 mg daily  Give nebulizations  Swallowing difficulty will be evaluated with speech pathology    Atrial fibrillation/flutter  Continue metoprolol succinate 50 mg daily and rivaroxaban anticoagulation    Coronary artery disease  We will continue metoprolol succinate, atorvastatin    Dementia  Lives in memory care unit  We will continue Aricept    Prostate cancer  We will continue Xarelto for DVT prophylaxis      Diet:   cardiac diet  DVT Prophylaxis: DOAC  [place]ackay Catheter: Not present  Lines: None     Cardiac Monitoring: None  Code Status:  DNR/DNI as confirmed from patient's RN POA daughter    Clinically Significant Risk Factors Present on Admission          # Hypocalcemia: Lowest Ca = 8.2 mg/dL in last 2 days, will monitor and replace as appropriate     # Hypoalbuminemia: Lowest albumin = 3.3 g/dL at 6/7/2023  6:47 PM, will monitor as appropriate  # Drug Induced Coagulation Defect: home medication list includes an anticoagulant medication                  Disposition Plan       Levon Mccloud MD  Hospitalist Service  [facility]  Securely message with [employer] (more info)  Text page via [employer] Paging/Directory      ______________________________________________________________________    Chief Complaint   Hypoxia    History is obtained from the patient's daughter Minerva who is his POA and a Retired RN, medical records and my discussion with emergency department physician      History of Present Illness   William Ba is a 88 year old gentleman with hypercholesteremia, CAD, atrial fibrillation/flutter, abdominal aortic aneurysm, prostate cancer, COPD, dementia who lives in a memory care group home     Patient daughter says that patient started having shortness of breath with a fever of 100  F around 11 AM and when she reached to see him around 5 PM he was coughing significantly and was significantly confused much more than his baseline confusion.  He was unsteady on his feet and was wheezing.  Oxygen saturations were found to be between 78 and 82% on room air.    In the [facility] emergency department his temperature was found to be 100.3  F pulse 123 respiration 24 blood pressure 107/78 and oxygen saturation 95% on 2 L nasal cannula O2  WBC 10,000 hemoglobin 11.7 platelet count 151,000        Past Medical History    Past Medical History:   Diagnosis Date     Abdominal aortic aneurysm without rupture (H) 02/25/2016     Atrial fibrillation/flutter      Atrial fibrillation/flutter 11/13/2019     COPD (chronic obstructive pulmonary disease)      Coronary atherosclerosis 01/29/2015     Mixed hyperlipidemia 11/08/2010       Past Surgical History   Past Surgical History:   Procedure Laterality Date     COLONOSCOPY       HERNIA REPAIR       REPLACEMENT TOTAL KNEE         Prior to Admission Medications   Prior to Admission Medications   Prescriptions Last Dose Informant Patient Reported? Taking?   EPINEPHrine (ANY BX GENERIC EQUIV) 0.3 MG/0.3ML injection 2-pack   No No   Sig: Inject 0.3 mLs (0.3 mg) into the muscle as needed for anaphylaxis   Misc Natural Products (OSTEO BI-FLEX ADV TRIPLE ST) TABS   Yes No   Sig:  Take 1 capsule by mouth 2 times daily    Multiple Vitamins-Minerals (MULTIVITAMIN ADULT PO)   Yes No   Sig: Take one(1) tablet daily.   albuterol (PROAIR HFA/PROVENTIL HFA/VENTOLIN HFA) 108 (90 Base) MCG/ACT inhaler   Yes No   Sig: Inhale 2 puffs into the lungs every 4 hours as needed for shortness of breath / dyspnea or wheezing   atorvastatin (LIPITOR) 40 MG tablet   No No   Sig: Take 1 tablet (40 mg) by mouth At Bedtime   diclofenac (VOLTAREN) 1 % topical gel   Yes No   Sig: Apply 2 g topically 2 times daily   donepezil (ARICEPT) 5 MG tablet   Yes No   Sig: Take 5 mg by mouth At Bedtime   famotidine (PEPCID) 10 MG tablet   Yes No   Sig: Take 10 mg by mouth every evening   fluticasone-vilanterol (BREO ELLIPTA) 100-25 MCG/INH inhaler   No No   Sig: Inhale 1 puff into the lungs daily   ipratropium - albuterol 0.5 mg/2.5 mg/3 mL (DUONEB) 0.5-2.5 (3) MG/3ML neb solution   No No   Sig: Take 1 vial (3 mLs) by nebulization 3 times daily as needed for shortness of breath, wheezing or cough   metoprolol succinate ER (TOPROL-XL) 50 MG 24 hr tablet   No No   Sig: Take 1 tablet (50 mg) by mouth daily   rivaroxaban ANTICOAGULANT (XARELTO) 20 MG TABS tablet   No No   Sig: Take 1 tablet (20 mg) by mouth daily (with dinner)      Facility-Administered Medications: None        Review of Systems    Patient is confused and has dementia therefore history was obtained from the daughter as mentioned above    Social History   He along with his wife lives at [facility]  Social History     Tobacco Use     Smoking status: Former     Packs/day: 1.00     Types: Cigarettes     Quit date:      Years since quittin.4     Smokeless tobacco: Never   Substance Use Topics     Alcohol use: Yes       Family History   I have reviewed this patient's family history and updated it with pertinent information if needed.  Family History   Problem Relation Age of Onset     Heart Disease Mother       Abdominal Aortic Aneurysm Father      Aneurysm Father      Colon Cancer Brother        Allergies   Allergies   Allergen Reactions     Bee Swelling     Influenza Virus Vaccine      Methylprednisolone Other (See Comments)     IV steroids causes confusion. PO steroids he tolerates with no issues        Physical Exam   Vital Signs: Temp: 100.3  F (37.9  C) Temp src: Rectal BP: 107/78 Pulse: (!) 123   Resp: 24 SpO2: 95 % O2 Device: Nasal cannula Oxygen Delivery: 2 LPM  Weight: 0 lbs 0 oz      GENERAL:  Patient does not look in any acute distress  HEENT:  Conjunctiva is clear   NECK: , thre is Jugular Venous distention  HEART: S1 S2 irregular tachycardia is present     LUNGS: Respirations are not laboured, Lungs are have decreased breath sounds in the lung bases to auscultation without any crepitations or Wheezing that I could hear at the present time  ABDOMEN: Soft , there is no tenderness , Bowel Sounds are  Positive   LOWER LIMBS: no Pedal Edema  Bilaterally   CNS: Patient was sleepy        Data   Imaging results reviewed over the past 24 hrs:   Recent Results (from the past 24 hour(s))   XR Chest 2 Views    Narrative    EXAM: XR CHEST 2 VIEWS  LOCATION: [facility]  DATE/TIME: 6/7/2023 7:40 PM CDT    INDICATION: Evaluate.   COMPARISON: 04/17/2023      Impression    IMPRESSION: Since 04/17/2023, there has been a slight increase in infiltrate involving the right lung base. Otherwise the chest is stable to include hyperinflation of both lungs is stability of the mild scattered areas of infiltrate in both lungs.   Partially calcified thoracic aorta. No pneumothorax or active CHF seen.     Most Recent 3 CBC's:Recent Labs   Lab Test 06/07/23 1847 04/17/23  0903 01/10/23  1527   WBC 10.0 15.0* 8.5   HGB 11.7* 13.0* 12.0*   MCV 93 98 102*    143* 247     Most Recent 3 BMP's:Recent Labs   Lab Test 06/07/23 1847 04/17/23  0903 01/10/23  1527    138 140   POTASSIUM 4.3 4.3 4.0   CHLORIDE  102 102 105   CO2 21* 24 21*   BUN 19.3 18.0 14.6   CR 0.84 0.84 0.81   ANIONGAP 13 12 14   NIEVES 8.2* 9.1 9.2   * 117* 77     Most Recent 2 LFT's:Recent Labs   Lab Test 06/07/23  1847 06/14/21  1005   AST 32 18   ALT 29 31   ALKPHOS 83 82   BILITOTAL 1.0 1.0

## 2023-06-08 NOTE — PHARMACY-ADMISSION MEDICATION HISTORY
Pharmacist Admission Medication History    Admission medication history is complete. The information provided in this note is only as accurate as the sources available at the time of the update.    Medication reconciliation/reorder completed by provider prior to medication history? No    Information Source(s): Facility ([place] [facility]/) medication list/MAR via patient Room on ED 11    Pertinent Information:  Beti Thacker of [place]    Changes made to PTA medication list:    Added: anay cream, combivent respimat, melatonin, APAP, mucinex    Deleted: duoneb    Changed: None    Medication Affordability:  Not including over the counter (OTC) medications, was there a time in the past 3 months when you did not take your medications as prescribed because of cost?: No    Allergies reviewed with patient and updates made in EHR: yes    Medication History Completed By: Yayo Serna RPH 6/7/2023 10:31 PM    Prior to Admission medications    Medication Sig Last Dose Taking? Auth Provider Long Term End Date   acetaminophen (TYLENOL) 500 MG tablet Take 1,000 mg by mouth every 8 hours as needed for mild pain  Yes Unknown, Entered By History     albuterol (PROAIR HFA/PROVENTIL HFA/VENTOLIN HFA) 108 (90 Base) MCG/ACT inhaler Inhale 2 puffs into the lungs every 4 hours as needed for shortness of breath / dyspnea or wheezing  Yes Reported, Patient     atorvastatin (LIPITOR) 40 MG tablet Take 1 tablet (40 mg) by mouth At Bedtime  Yes Adryan James MD Yes    diclofenac (VOLTAREN) 1 % topical gel Apply 2 g topically 2 times daily  Yes Reported, Patient     donepezil (ARICEPT) 5 MG tablet Take 5 mg by mouth every evening  Yes Reported, Patient     EPINEPHrine (ANY BX GENERIC EQUIV) 0.3 MG/0.3ML injection 2-pack Inject 0.3 mLs (0.3 mg) into the muscle as needed for anaphylaxis  Yes Antoni Schmidt MD     famotidine (PEPCID) 10 MG tablet Take 10 mg by mouth every morning  Yes Unknown, Entered By History     fluticasone-vilanterol  (BREO ELLIPTA) 100-25 MCG/INH inhaler Inhale 1 puff into the lungs daily  Yes Adryan James MD     guaiFENesin (MUCINEX) 600 MG 12 hr tablet Take 600 mg by mouth 2 times daily as needed for congestion  Yes Unknown, Entered By History     ipratropium-albuterol (COMBIVENT RESPIMAT)  MCG/ACT inhaler Inhale 1 puff into the lungs 2 times daily  Yes Unknown, Entered By History Yes    melatonin 3 MG tablet Take 3 mg by mouth every evening  Yes Unknown, Entered By History     metoprolol succinate ER (TOPROL-XL) 50 MG 24 hr tablet Take 1 tablet (50 mg) by mouth daily  Yes Adryan James MD Yes    miconazole with skin protectant (NETTE ANTIFUNGAL) 2 % CREA cream Apply topically 2 times daily And TID prn  Yes Unknown, Entered By History     Misc Natural Products (OSTEO BI-FLEX ADV TRIPLE ST) TABS Take 1 capsule by mouth 2 times daily   Yes Reported, Patient     Multiple Vitamins-Minerals (MULTIVITAMIN ADULT PO) Take one(1) tablet daily.  Yes Reported, Patient     rivaroxaban ANTICOAGULANT (XARELTO) 20 MG TABS tablet Take 1 tablet (20 mg) by mouth daily (with dinner)  Yes Adryan James MD Yes         Abraham Velez APRN CNP Yes

## 2023-06-08 NOTE — PROGRESS NOTES
"   06/08/23 1106   Appointment Info   Signing Clinician's Name / Credentials (SLP) Paola Lane MS CCC-SLP   General Information   Onset of Illness/Injury or Date of Surgery 06/07/23   Referring Physician Levon Mccloud MD   Patient/Family Therapy Goal Statement (SLP) unable   Pertinent History of Current Problem \"William Ba is a 88 year old gentleman with hypercholesteremia, CAD, atrial fibrillation/flutter, abdominal aortic aneurysm, prostate cancer, COPD, dementia who lives in a memory care group home. Patient daughter says that patient started having shortness of breath with a fever of 100  F around 11 AM and when she reached to see him around 5 PM he was coughing significantly and was significantly confused much more than his baseline confusion.  He was unsteady on his feet and was wheezing.  Oxygen saturations were found to be between 78 and 82% on room air.Patient lives in the memory care unit had swallowing difficulty for many years which has progressively gotten worse. He was hypoxic with oxygen saturation decreasing to 78% in the group home where he lives with his wife along with a temperature of 100  F patient was diagnosed with pneumonia on chest x-ray.\"   General Observations Pt alert in ED room. Unable to follow conversation, commands or review of case history. Pt appeared easily frustrated. Notable tremor. Sitter present and reported ate breakfast well with no dysphagia symptoms. Assist needed for feeding. Baseline, wet cough observed and x2 during evaluation.   Type of Evaluation   Type of Evaluation Swallow Evaluation   Oral Motor   Oral Musculature unable to assess due to poor participation/comprehension   Vocal Quality/Secretion Management (Oral Motor)   Secretion Management (Oral Motor) wet/gurgly vocal quality   General Swallowing Observations   Past History of Dysphagia No documented hx; family not present; pt unable to provide   Respiratory Support (General Swallowing Observations) none "   Current Diet/Method of Nutritional Intake (General Swallowing Observations, NIS) regular diet;thin liquids (level 0)   Swallowing Evaluation Clinical swallow evaluation   Clinical Swallow Evaluation   Feeding Assistance frequent cues/help required   Esophageal Phase of Swallow   Patient reports or presents with symptoms of esophageal dysphagia Yes   Swallowing Recommendations   Diet Consistency Recommendations regular diet;thin liquids (level 0)   Supervision Level for Intake 1:1 supervision needed   Mode of Delivery Recommendations bolus size, small;slow rate of intake   Swallowing Maneuver Recommendations alternate food and liquid intake   Monitoring/Assistance Required (Eating/Swallowing) stop eating activities when fatigue is present;check mouth frequently for oral residue/pocketing;monitor for cough or change in vocal quality with intake   Recommended Feeding/Eating Techniques (Swallow Eval) maintain upright sitting position for eating;maintain upright posture during/after eating for 30 minutes;minimize distractions during oral intake;provide 6 smaller meals throughout day;provide assist with feeding   General Therapy Interventions   Planned Therapy Interventions Dysphagia Treatment   Clinical Impression   Criteria for Skilled Therapeutic Interventions Met (SLP Eval) Yes, treatment indicated   SLP Diagnosis Mild dysphagia   Clinical Impression Comments SLP: Pt presents with mild oral dysphagia and suspected chronic esophageal dysphagia. Unable to find notes indicating hx of dysphagia, however, did not hx of hernia repair and pt grabbing at his chest after eating, indicating possible esophageal dysphagia. Trials of thin water and sandra cracker bites with 1:1 assist required. Pt highly distracted and required cues to attend to task of chewing and clearing oral cavity. With cues, pt cleared oral cavity well. No immediate s/sx of aspiration. Okay to continue regular diet and thin liquids with increased use of  reflux strategies: fully upright, small bites and sips, slow rate, consider smaller/more frequent snacks instead of large meals and stay upright for 60 minutes after eating. Continue assist as needed. Hold if overt s/sx of aspiration.   SLP Total Evaluation Time   Eval: oral/pharyngeal swallow function, clinical swallow Minutes (10078) 20   SLP Goals   Therapy Frequency (SLP Eval) 3 times/wk   SLP Predicted Duration/Target Date for Goal Attainment 06/29/23   SLP Goals Swallow   SLP: Safely tolerate diet without signs/symptoms of aspiration Regular diet;Thin liquids;With use of swallow precautions   SLP Discharge Planning   SLP Plan Meal follow up   SLP Discharge Recommendation Long term care facility;home with home care speech therapy   SLP Rationale for DC Rec Plan 1x follow up at a meal; could consider  SLP for follow up at pt's facility   SLP Brief overview of current status  Reduce distractions while eating; Okay to continue regular diet and thin liquids with increased use of reflux strategies: fully upright, small bites and sips, slow rate, consider smaller/more frequent snacks instead of large meals and stay upright for 60 minutes after eating. Continue assist as needed. Hold if overt s/sx of aspiration.   Total Session Time   Total Session Time (sum of timed and untimed services) 20

## 2023-06-08 NOTE — ED NOTES
Pt confused. Keeps trying to get out of bed. Daughter in room with him. Daughter tried to intervene to prevent him from getting out of bed. Pt swung at daughter. Pt did not make contact with daughter however. Sitter requested for pt. Charge nurse aware.

## 2023-06-08 NOTE — ED NOTES
[facility]  ED Nurse Handoff Report    ED Chief complaint: Cough and Fatigue  . ED Diagnosis:   Final diagnoses:   Pneumonia of right lung due to infectious organism, unspecified part of lung   COPD exacerbation (H)       Allergies:   Allergies   Allergen Reactions     Bee Swelling     Influenza Virus Vaccine      Methylprednisolone Other (See Comments)     IV steroids causes confusion. PO steroids he tolerates with no issues       Code Status: Full Code    Activity level - Baseline/Home:  assist of 1.  Activity Level - Current:   assist of 1.   Lift room needed: No.   Bariatric: No   Needed: No   Isolation: No.   Infection: Not Applicable.     Respiratory status: Room air    Vital Signs (within 30 minutes):   Vitals:    06/07/23 1838 06/07/23 1850   BP: 107/78    Pulse: (!) 123    Resp: 24    Temp: 100.3  F (37.9  C)    TempSrc: Rectal    SpO2: (!) 88% 95%       Cardiac Rhythm:  ,      Pain level:    Patient confused: Yes.   Patient Falls Risk: bed/chair alarm on, nonskid shoes/slippers when out of bed, activity supervised and room door open.   Elimination Status: Has voided     Patient Report - Initial Complaint: Cough, Fatigue.   Focused Assessment: William Ba is a 88 year old male on Xarelto with history of COPD who presents to the ED via EMS from [facility] with a cough, shortness of breath, and hypoxia beginning suddenly around 3 pm today. Patient was feeling well today until he acutely declined this afternoon. Staff at his facility found that he was satting around 85% at this time. Upon EMS arrival he was satting at 92%. No recent falls per EMS./ Of note, patient typically gets pneumonia once a year.      Abnormal Results:   Labs Ordered and Resulted from Time of ED Arrival to Time of ED Departure   ISTAT GASES LACTATE VENOUS POCT - Abnormal       Result Value    Lactic Acid POCT 1.0      Bicarbonate Venous POCT 22      O2 Sat, Venous POCT 90 (*)     pCO2 Venous POCT 29  (*)     pH Venous POCT 7.48 (*)     pO2 Venous POCT 52 (*)    COMPREHENSIVE METABOLIC PANEL - Abnormal    Sodium 136      Potassium 4.3      Chloride 102      Carbon Dioxide (CO2) 21 (*)     Anion Gap 13      Urea Nitrogen 19.3      Creatinine 0.84      Calcium 8.2 (*)     Glucose 111 (*)     Alkaline Phosphatase 83      AST 32      ALT 29      Protein Total 6.3 (*)     Albumin 3.3 (*)     Bilirubin Total 1.0      GFR Estimate 84     CBC WITH PLATELETS AND DIFFERENTIAL - Abnormal    WBC Count 10.0      RBC Count 3.77 (*)     Hemoglobin 11.7 (*)     Hematocrit 34.9 (*)     MCV 93      MCH 31.0      MCHC 33.5      RDW 13.6      Platelet Count 151      % Neutrophils 79      % Lymphocytes 11      % Monocytes 8      % Eosinophils 1      % Basophils 0      % Immature Granulocytes 1      NRBCs per 100 WBC 0      Absolute Neutrophils 8.0      Absolute Lymphocytes 1.1      Absolute Monocytes 0.8      Absolute Eosinophils 0.1      Absolute Basophils 0.0      Absolute Immature Granulocytes 0.1      Absolute NRBCs 0.0     LACTIC ACID WHOLE BLOOD - Normal    Lactic Acid 1.2     INFLUENZA A/B, RSV, & SARS-COV2 PCR - Normal    Influenza A PCR Negative      Influenza B PCR Negative      RSV PCR Negative      SARS CoV2 PCR Negative          XR Chest 2 Views   Final Result   IMPRESSION: Since 04/17/2023, there has been a slight increase in infiltrate involving the right lung base. Otherwise the chest is stable to include hyperinflation of both lungs is stability of the mild scattered areas of infiltrate in both lungs.    Partially calcified thoracic aorta. No pneumothorax or active CHF seen.          Treatments provided: abx, fluids, albuterol, (see MAR)  Family Comments: daughter at bedside  OBS brochure/video discussed/provided to patient:  N/A  ED Medications:   Medications   azithromycin 500 mg (ZITHROMAX) in 0.9% NaCl 250 mL intermittent infusion 500 mg (500 mg Intravenous $New Bag 6/7/23 2013)   cefTRIAXone (ROCEPHIN) 1 g vial  to attach to  mL bag for ADULTS or NS 50 mL bag for PEDS (0 g Intravenous Stopped 6/7/23 2014)   albuterol (PROVENTIL HFA/VENTOLIN HFA) inhaler (2 puffs Inhalation $Given 6/7/23 1913)   predniSONE (DELTASONE) tablet 40 mg (40 mg Oral $Given 6/7/23 1914)   acetaminophen (TYLENOL) tablet 1,000 mg (1,000 mg Oral $Given 6/7/23 1915)   0.9% sodium chloride BOLUS (0 mLs Intravenous Stopped 6/7/23 2014)       Drips infusing:  No  For the majority of the shift this patient was Green.   Interventions performed were n/a.    Sepsis treatment initiated: No    Cares/treatment/interventions/medications to be completed following ED care: dorota Batres    ED Nurse Name: Susan Lemus RN  8:24 PM

## 2023-06-08 NOTE — CONSULTS
Care Management Initial Consult    General Information  Assessment completed with: Family, daughter       Primary Care Provider verified and updated as needed:     Readmission within the last 30 days:        Reason for Consult: discharge planning  Advance Care Planning:            Communication Assessment  Patient's communication style: spoken language (English or Bilingual)             Cognitive  Cognitive/Neuro/Behavioral: WDL                      Living Environment:   People in home: facility resident     Current living Arrangements: assisted living  Name of Facility: [facility]   Able to return to prior arrangements:         Family/Social Support:  Care provided by:    Provides care for: no one, unable/limited ability to care for self                Description of Support System:           Current Resources:   Patient receiving home care services:       Community Resources:    Equipment currently used at home:    Supplies currently used at home:      Employment/Financial:  Employment Status:          Financial Concerns:             Does the patient's insurance plan have a 3 day qualifying hospital stay waiver?  No    Lifestyle & Psychosocial Needs:  Social Determinants of Health     Tobacco Use: Medium Risk (5/10/2023)    Patient History      Smoking Tobacco Use: Former      Smokeless Tobacco Use: Never      Passive Exposure: Not on file   Alcohol Use: Unknown (7/1/2020)    AUDIT-C      Frequency of Alcohol Consumption: Monthly or less      Average Number of Drinks: 1 or 2      Frequency of Binge Drinking: Not on file   Financial Resource Strain: Not on file   Food Insecurity: Not on file   Transportation Needs: Not on file   Physical Activity: Not on file   Stress: Not on file   Social Connections: Not on file   Intimate Partner Violence: Not on file   Depression: Not at risk (9/23/2020)    PHQ-2      PHQ-2 Score: 0   Housing Stability: Not on file       Functional Status:  Prior to admission  patient needed assistance:              Mental Health Status:          Chemical Dependency Status:                Values/Beliefs:  Spiritual, Cultural Beliefs, Orthodoxy Practices, Values that affect care:                 Additional Information:  BA spoke with patient's daughter via telephone to discuss discharge planning. She shared patient will return to memory care at [facility] & will need transportation arranged. BA discussed that transportation may be a private pay expense. BA spoke with Isabella nurse at [facility] ([phone] option 7) who voiced patient is able to return anytime today. Per discussion with nursing patient will need a stretcher ride arranged. BA updated facility & patient's daughter that per EMS ride will be here in about an hour. BA made referral to [facility] per daughter's request for recommended home ST. BA faxed discharge orders to [facility] at [phone] per there request.     LISA Morrissey    [facility] updated they are unable to accept patient due to no speech therapy being available in patient's area. BA called & updated patient's daughter, Erin who voiced she has no other preferences for home care & would be agreeable to referrals being made to additional agency's. BA sent referral to [facility].     LISA Morrissey  [facility]  6/8/2023  2:55 PM

## 2023-06-08 NOTE — PLAN OF CARE
PRIMARY DIAGNOSIS: PNEUMONIA  OUTPATIENT/OBSERVATION GOALS TO BE MET BEFORE DISCHARGE:  Dyspnea improved and O2 sats >88% on RA or back to baseline O2 levels: Yes   SpO2: 98 %, O2 Device: Nasal cannula    Tolerating oral abx or appropriate plans made outpatient infusion: No    Vitals signs normal or return to baseline: Yes    Short term supplemental O2 needed with activity at home: Yes    Tolerate oral intake to maintain hydration: Yes    Return to near baseline physical activity: Yes    Discharge Planner Nurse   Safe discharge environment identified: Yes  Barriers to discharge: no       Entered by: Emilia Ashford RN 06/08/2023 10:08 AM     Please review provider order for any additional goals.   Nurse to notify provider when observation goals have been met and patient is ready for discharge.Goal Outcome Evaluation:

## 2023-06-08 NOTE — DISCHARGE SUMMARY
[facility]    Discharge Summary  Hospitalist    Date of Admission:  6/7/2023  Date of Discharge:  6/8/2023  Discharging Provider: Jose Silverman MD  Date of Service (when I saw the patient): 06/08/23    Discharge Diagnoses   #Acute hypoxemic respiratory failure secondary to suspected aspiration pneumonia  #Dysphagia in the setting of advanced dementia  #History of atrial fibrillation on DOAC therapy  #History of prostate cancer  #History of advanced dementia    History of Present Illness   William Ba is a 88 year old gentleman with hypercholesteremia, CAD, atrial fibrillation/flutter, abdominal aortic aneurysm, prostate cancer, COPD, dementia who lives in a memory care group home      Patient daughter says that patient started having shortness of breath with a fever of 100  F around 11 AM and when she reached to see him around 5 PM he was coughing significantly and was significantly confused much more than his baseline confusion.  He was unsteady on his feet and was wheezing.  Oxygen saturations were found to be between 78 and 82% on room air.     In the [facility] emergency department his temperature was found to be 100.3  F pulse 123 respiration 24 blood pressure 107/78 and oxygen saturation 95% on 2 L nasal cannula O2  WBC 10,000 hemoglobin 11.7 platelet count 151,000    Hospital Course   William Ba was admitted on 6/7/2023.  The following problems were addressed during his hospitalization:    #Acute hypoxic respiratory failure. Bilateral pneumonia. Suspected aspiration. Dysphagia: Patient lives in the memory care unit had swallowing difficulty for many years which has progressively gotten worse.  He was hypoxic with oxygen saturation decreasing to 78% in the group home where he lives with his wife along with a temperature of 100  F patient was diagnosed with pneumonia on chest x-ray  -Patient was registered observation but remained in the ER as a border.  He was able to  have his oxygen weaned on the a.m. of 6/8.  He was saturating well on room air.  He was treated with ceftriaxone and azithromycin.  He was also given 2 doses of prednisone.  -He showed clinical improvement with weaning of oxygen and stable hemodynamics.  Swallow therapy was consulted and they did recommend continued home speech therapy for swallow therapy which was ordered on discharge.  He will complete a short course of Augmentin on discharge.  -I did not prescribe a course of prednisone given his underlying dementia and possible behavioral disturbances with steroids in the past.  Discussed with daughter prior to discharge.     Atrial fibrillation/flutter: Patient was switched from metoprolol succinate to metoprolol tartrate given his meds need to be crushed.  He will continue on his home DOAC therapy.     Coronary artery disease  We will continue metoprolol, atorvastatin     Dementia  Lives in memory care unit  We will continue Aricept     Prostate cancer  We will continue Xarelto for DVT prophylaxis    Jose Silverman MD    Code Status   DNR / DNI       Primary Care Physician   Abraham Velez    Physical Exam   Temp: 97.7  F (36.5  C) Temp src: Oral BP: (!) 133/94 Pulse: 104   Resp: 20 SpO2: 98 % O2 Device: Nasal cannula Oxygen Delivery: 1/2 LPM  There were no vitals filed for this visit.  Vital Signs with Ranges  Temp:  [97.7  F (36.5  C)-100.3  F (37.9  C)] 97.7  F (36.5  C)  Pulse:  [] 104  Resp:  [20-24] 20  BP: (107-162)/() 133/94  SpO2:  [86 %-98 %] 98 %  I/O last 3 completed shifts:  In: -   Out: 350 [Urine:350]    Patient is sitting up in bed.  He is in no acute distress.  He does answer some simple yes/no questions but confused.  Moves all extremities.  He is moving air bilaterally.  No significant expiratory wheezing.  He does have periodic cough noted.  Heart is irregular with systolic murmur.  Abdomen is soft and nondistended.  No tenderness.    Discharge Disposition   Discharge back  to memory care.  Condition at discharge: Stable    Consultations This Hospital Stay   SPEECH LANGUAGE PATH ADULT IP CONSULT  RESPIRATORY CARE IP CONSULT  SMOKING CESSATION PROGRAM IP CONSULT    Time Spent on this Encounter   I, Jose Silverman MD, personally saw the patient today and spent greater than 30 minutes discharging this patient.    Discharge Orders      Home Care Referral      Reason for your hospital stay    You were hospitalized for cough, shortness of breath suspected secondary to aspiration pneumonia.  You were treated with antibiotics and initially steroids.  Your oxygen was able to be weaned.  You were seen by swallow therapy.  It is important that with any oral intake you are closely supervised to minimize risk of aspiration.  Unfortunately, with progression of dementia aspiration events do become more frequent and diet modifications may be necessary in the future.     Follow-up and recommended labs and tests     Follow up with primary care provider, Abraham Velez, within 7 days for hospital follow- up.  The following labs/tests are recommended: CBC and BMP.     Activity    Your activity upon discharge: activity as tolerated     No CPR- Do NOT Intubate     Diet    Follow this diet upon discharge: Orders Placed This Encounter      Low Saturated Fat Na <2400 mg     Discharge Medications   Current Discharge Medication List      START taking these medications    Details   amoxicillin-clavulanate (AUGMENTIN) 875-125 MG tablet Take 1 tablet by mouth 2 times daily for 6 days  Qty: 12 tablet, Refills: 0    Associated Diagnoses: Pneumonia of right lung due to infectious organism, unspecified part of lung      metoprolol tartrate (LOPRESSOR) 25 MG tablet Take 1 tablet (25 mg) by mouth 2 times daily for 30 days  Qty: 60 tablet, Refills: 0    Associated Diagnoses: Atrial fibrillation, unspecified type (H)         CONTINUE these medications which have NOT CHANGED    Details   acetaminophen (TYLENOL) 500 MG  tablet Take 1,000 mg by mouth every 8 hours as needed for mild pain      albuterol (PROAIR HFA/PROVENTIL HFA/VENTOLIN HFA) 108 (90 Base) MCG/ACT inhaler Inhale 2 puffs into the lungs every 4 hours as needed for shortness of breath / dyspnea or wheezing      atorvastatin (LIPITOR) 40 MG tablet Take 1 tablet (40 mg) by mouth At Bedtime  Qty: 90 tablet, Refills: 3    Associated Diagnoses: Mixed hyperlipidemia      diclofenac (VOLTAREN) 1 % topical gel Apply 2 g topically 2 times daily      donepezil (ARICEPT) 5 MG tablet Take 5 mg by mouth every evening      EPINEPHrine (ANY BX GENERIC EQUIV) 0.3 MG/0.3ML injection 2-pack Inject 0.3 mLs (0.3 mg) into the muscle as needed for anaphylaxis  Qty: 1 each, Refills: 3    Comments: Replace previous script sent  Associated Diagnoses: Allergic reaction, subsequent encounter      famotidine (PEPCID) 10 MG tablet Take 10 mg by mouth every morning      fluticasone-vilanterol (BREO ELLIPTA) 100-25 MCG/INH inhaler Inhale 1 puff into the lungs daily  Qty: 1 Inhaler, Refills: 11    Associated Diagnoses: Pulmonary emphysema, unspecified emphysema type (H)      guaiFENesin (MUCINEX) 600 MG 12 hr tablet Take 600 mg by mouth 2 times daily as needed for congestion      ipratropium-albuterol (COMBIVENT RESPIMAT)  MCG/ACT inhaler Inhale 1 puff into the lungs 2 times daily      melatonin 3 MG tablet Take 3 mg by mouth every evening      miconazole with skin protectant (NETTE ANTIFUNGAL) 2 % CREA cream Apply topically 2 times daily And TID prn      Misc Natural Products (OSTEO BI-FLEX ADV TRIPLE ST) TABS Take 1 capsule by mouth 2 times daily       Multiple Vitamins-Minerals (MULTIVITAMIN ADULT PO) Take one(1) tablet daily.      rivaroxaban ANTICOAGULANT (XARELTO) 20 MG TABS tablet Take 1 tablet (20 mg) by mouth daily (with dinner)  Qty: 90 tablet, Refills: 3    Associated Diagnoses: Atrial fibrillation/flutter         STOP taking these medications       metoprolol succinate ER (TOPROL-XL)  50 MG 24 hr tablet Comments:   Reason for Stopping:             Allergies   Allergies   Allergen Reactions     Bee Swelling     Influenza Virus Vaccine      Methylprednisolone Other (See Comments)     IV steroids causes confusion. PO steroids he tolerates with no issues     Data   Most Recent 3 CBC's:  Recent Labs   Lab Test 06/08/23  0745 06/07/23  1847 04/17/23  0903   WBC 8.1 10.0 15.0*   HGB 12.5* 11.7* 13.0*   MCV 95 93 98    151 143*      Most Recent 3 BMP's:  Recent Labs   Lab Test 06/08/23  0745 06/07/23  1847 04/17/23  0903    136 138   POTASSIUM 4.3 4.3 4.3   CHLORIDE 104 102 102   CO2 21* 21* 24   BUN 16.0 19.3 18.0   CR 0.72 0.84 0.84   ANIONGAP 14 13 12   NIEVES 8.5* 8.2* 9.1   * 111* 117*     Most Recent 2 LFT's:  Recent Labs   Lab Test 06/07/23  1847 06/14/21  1005   AST 32 18   ALT 29 31   ALKPHOS 83 82   BILITOTAL 1.0 1.0     Most Recent INR's and Anticoagulation Dosing History:  Anticoagulation Dose History         Latest Ref Rng & Units 6/10/2020   Recent Dosing and Labs   INR 0.86 - 1.14 2.68                Most Recent 3 Troponin's:  Recent Labs   Lab Test 06/14/21  1005 06/13/20  2307 10/26/19  0649   TROPI <0.015 <0.015 <0.015     Most Recent Cholesterol Panel:  Recent Labs   Lab Test 09/23/20  0000   CHOL 104   LDL 43   HDL 51   TRIG 48     Most Recent 6 Bacteria Isolates From Any Culture (See EPIC Reports for Culture Details):  Recent Labs   Lab Test 06/15/20  1705 06/14/20  0159 06/13/20  2306   CULT Light growth  Normal zan   No growth No growth     Most Recent TSH, T4 and A1c Labs:No lab results found.

## 2023-06-08 NOTE — PLAN OF CARE
[facility]    ED Boarding Nurse Handoff Addendum Report:    Date/time: 6/8/2023, 2:53 PM    Activity Level: assist of 2    Fall Risk: Yes:  activity supervised    Active Infusions: none    Current Meds Due: none    Current care needs: one assist    Oxygen requirements (liters/min and/or FiO2): 3 l    Respiratory status: Nasal cannula    Vital signs (within last 30 minutes):    Vitals:    06/08/23 0600 06/08/23 0700 06/08/23 0800 06/08/23 0811   BP: (!) 162/95 (!) 155/123 (!) 133/94 (!) 133/94   BP Location:    Right arm   Pulse: 89 (!) 122 77 104   Resp:    20   Temp:    97.7  F (36.5  C)   TempSrc:    Oral   SpO2: 97% 90% (!) 86% 98%       Focused assessment within last 30 minutes:  Oxygen, put pt on 3 l oxygen        ED Boarding Nurse name: Sarah Hall RN

## 2023-06-08 NOTE — PLAN OF CARE
[facility]    ED Boarding Nurse Handoff Addendum Report:    Date/time: 6/8/2023, 6:42 AM    Activity Level: assist of 1    Fall Risk: Yes:  nonskid shoes/slippers when out of bed, arm band in place, assistive device/personal items within reach, activity supervised, mobility aid in reach and room door open, sitter at bedside    Active Infusions: None    Current Meds Due: See MAR    Current care needs: 1:1 sitter, symptom management, Speech consult, antibiotics, neb tx    Oxygen requirements (liters/min and/or FiO2): none    Respiratory status: Room air    Vital signs (within last 30 minutes):    Vitals:    06/08/23 0500 06/08/23 0515 06/08/23 0530 06/08/23 0545   BP: (!) 158/120 (!) 160/92 (!) 160/93 (!) 145/96   Pulse: 72 80 108 84   Resp:       Temp:       TempSrc:       SpO2: 94% 96% 97% 96%       Focused assessment within last 30 minutes:    Pt alert to self only, denies pain, no non-verbal indications of pain observed, PIV flushed and SL, sitter at bedside.     ED Boarding Nurse name: Marichuy Brower RN

## 2023-06-09 NOTE — PROGRESS NOTES
Lilliana Green, you need to see her eye doctor once a year. After he see her eye doctor make sure he sends us a report. [facility]    Hospitalist Progress Note  Name: William Ba    MRN: [id number]  Provider: Little William MD  Date of Service: 06/09/2023    Assessment & Plan   Summary of Stay: William Ba is a 88 year old male who was admitted on 6/8/2023 for increased agitation in setting of new diagnosis of aspiration pneumonia.  Patient's past medical history significant for coronary artery disease, atrial fibrillation, prostate cancer, COPD, dementia, hyperlipidemia lives in a memory care group home.  He was recently discharged from emergency room for aspiration pneumonia on Augmentin.  But returned with increased agitation.        Recent agitation in setting of known dementia  Acute infectious encephalopathy  -In the emergency room patient appears to be, started on Seroquel 12.5 mg every 6 hours as needed  -Supportive care  -Redirection  -I will check urine analysis    Acute Resp failure with hypoxia  Recent diagnosis of aspiration pneumonia  -A day prior to most recent hospitalization patient was admitted for acute respiratory failure.  Oxygen needs were down to baseline was discharged home on room air oxygenation  -Speech therapist was consulted recommended regular diet with thin liquids but reduce distraction while eating  -Patient received a dose of ceftriaxone and azithromycin in emergency room.  Was switched to Unasyn given aspiration pneumonia  -Continue on nebs and Mucinex  - requiring increased o2, tachypneic  - on exam has crackles   - iv lasixs 40mg X1  - Iv ativan for anxiety  - discontinue fluids  - daily weight  - d/w pt's daughter who is a nurse    Atrial fibrillation and flutter  -Continue metoprolol  -Continue Xarelto    Coronary artery disease  -On metoprolol, atorvastatin    Known history of dementia  -Lives in memory care  -Continue Aricept      DVT Prophylaxis: Pneumatic Compression Devices  Code Status: No CPR- Do NOT Intubate    Disposition: Expected discharge at least 2 days pending  improvement      Interval History   Assumed care reviewed chart. Pt is confused cannot complete 10 point ROS. Daughter at bedside pt is tachypneic with audible breathing    -Data reviewed today: I reviewed all new labs and imaging reports over the last 24 hours. I personally reviewed no images or EKG's today.    Physical Exam   Temp: 98  F (36.7  C) Temp src: Axillary BP: (!) 156/116 Pulse: 102   Resp: (!) 34 SpO2: 96 % O2 Device: Nasal cannula Oxygen Delivery: 2 LPM  There were no vitals filed for this visit.  Vital Signs with Ranges  Temp:  [97.7  F (36.5  C)-98.4  F (36.9  C)] 98  F (36.7  C)  Pulse:  [] 102  Resp:  [20-34] 34  BP: (115-156)/() 156/116  SpO2:  [91 %-97 %] 96 %  I/O last 3 completed shifts:  In: 100 [P.O.:100]  Out: -       GEN:  Alert, could not assess orientation , tachypneic.  HEENT:  Normocephalic/atraumatic, no scleral icterus, no nasal discharge, mouth dry.  CV :tachycardic no murmur or JVD.  S1 + S2 noted, no S3 or S4.  LUNGS:  Bibasilar crackles,  Symmetric chest rise on inhalation noted.  ABD:  Active bowel sounds, soft, non-tender/non-distended.  No rebound/guarding/rigidity.  EXT:  No edema.  No cyanosis.    SKIN:  Dry to touch, no exanthems noted in the visualized areas.    Medications     sodium chloride         ampicillin-sulbactam  3 g Intravenous Q6H     atorvastatin  40 mg Oral At Bedtime     diclofenac  2 g Topical BID     donepezil  5 mg Oral QPM     famotidine  10 mg Oral QAM     guaiFENesin  600 mg Oral BID     ipratropium - albuterol 0.5 mg/2.5 mg/3 mL  3 mL Nebulization 4x daily     metoprolol tartrate  25 mg Oral BID     rivaroxaban ANTICOAGULANT  20 mg Oral Daily with supper     senna-docusate  1 tablet Oral BID    Or     senna-docusate  2 tablet Oral BID     Data     Recent Labs   Lab 06/08/23  1924 06/08/23  0745 06/07/23  1847   WBC 11.3* 8.1 10.0   HGB 11.6* 12.5* 11.7*   HCT 36.4* 38.6* 34.9*   MCV 96 95 93    152 151     Recent Labs   Lab  06/08/23  1924 06/08/23  0745 06/07/23  1847    139 136   POTASSIUM 4.8 4.3 4.3   CHLORIDE 108* 104 102   CO2 23 21* 21*   ANIONGAP 12 14 13   * 121* 111*   BUN 22.4 16.0 19.3   CR 0.80 0.72 0.84   GFRESTIMATED 86 88 84   NIEVES 9.0 8.5* 8.2*     7-Day Micro Results     Collected Updated Procedure Result Status      06/08/2023 2011 06/08/2023 2017 Blood Culture Peripheral Blood [[id number]]   Peripheral Blood    In process Component Value   No component results               06/08/2023 2011 06/08/2023 2017 Blood Culture Line, venous [[id number]]   Blood from Line, venous    In process Component Value   No component results               06/07/2023 1858 06/07/2023 1945 Symptomatic Influenza A/B, RSV, & SARS-CoV2 PCR (COVID-19) Nasopharyngeal [[id number]]    Swab from Nasopharyngeal    Final result Component Value   Influenza A PCR Negative   Influenza B PCR Negative   RSV PCR Negative   SARS CoV2 PCR Negative   NEGATIVE: SARS-CoV-2 (COVID-19) RNA not detected, presumed negative.                Recent Labs   Lab 06/07/23  1847   AST 32   ALT 29   ALKPHOS 83   BILITOTAL 1.0     No results for input(s): INR in the last 168 hours.  Recent Labs   Lab 06/08/23 2011 06/08/23  1933 06/07/23  1847   LACT 1.9 3.5* 1.2     No results for input(s): LIPASE in the last 168 hours.  No results for input(s): TROPONIN, TROPI, TROPR, TROPONINIS in the last 168 hours.    Invalid input(s): TROPT, TROP, TROPONINIES, TNIH  No results for input(s): COLOR, APPEARANCE, URINEGLC, URINEBILI, URINEKETONE, SG, UBLD, URINEPH, PROTEIN, UROBILINOGEN, NITRITE, LEUKEST, RBCU, WBCU in the last 168 hours.    Recent Results (from the past 24 hour(s))   Chest XR,  PA & LAT    Narrative    EXAM: XR CHEST 2 VIEWS  LOCATION: [facility]  DATE/TIME: 6/8/2023 8:35 PM CDT    INDICATION: Cough. Pneumonia.  COMPARISON: 06/07/2023.      Impression    IMPRESSION: Slight worsening of right basilar opacities since previous day.  No other significant change. Cannot exclude minimal pleural fluid.

## 2023-06-09 NOTE — PROGRESS NOTES
Pt has a sitter 1:1 pt has PNA O2 sats dip into mid 80's, on 3 lpm NC. Gave robitussin for congestion. Pt takes pills crushed in apple sauce. LOST IV access, flyer/VAT paged to start new IV, on blood thinners. Pt has labored breathing, Hx of COPD. NOOB, tried to get upt to bedside commode with assist of 3 - his legs would not bend enough to use bedside commode. Will need to use lift

## 2023-06-09 NOTE — PHARMACY-ADMISSION MEDICATION HISTORY
Pharmacist Admission Medication History    Admission medication history is complete. The information provided in this note is only as accurate as the sources available at the time of the update.    Medication reconciliation/reorder completed by provider prior to medication history? No    Information Source(s): Hospital records via N/A    Pertinent Information: Patient's wife has a list from hospital     Changes made to PTA medication list:    Added: None    Deleted: None    Changed: None    Medication Affordability:  Not including over the counter (OTC) medications, was there a time in the past 3 months when you did not take your medications as prescribed because of cost?: No    Allergies reviewed with patient and updates made in EHR: yes    Medication History Completed By: Jonathon Rivera RPH 6/8/2023 10:39 PM    Prior to Admission medications    Medication Sig Last Dose Taking? Auth Provider Long Term End Date   acetaminophen (TYLENOL) 500 MG tablet Take 1,000 mg by mouth every 8 hours as needed for mild pain 6/8/2023 Yes Unknown, Entered By History     albuterol (PROAIR HFA/PROVENTIL HFA/VENTOLIN HFA) 108 (90 Base) MCG/ACT inhaler Inhale 2 puffs into the lungs every 4 hours as needed for shortness of breath / dyspnea or wheezing 6/8/2023 Yes Reported, Patient     amoxicillin-clavulanate (AUGMENTIN) 875-125 MG tablet Take 1 tablet by mouth 2 times daily for 6 days Unknown Yes Jose Sivlerman MD  6/14/23   atorvastatin (LIPITOR) 40 MG tablet Take 1 tablet (40 mg) by mouth At Bedtime 6/7/2023 Yes Adryan James MD Yes    diclofenac (VOLTAREN) 1 % topical gel Apply 2 g topically 2 times daily 6/8/2023 Yes Reported, Patient     donepezil (ARICEPT) 5 MG tablet Take 5 mg by mouth every evening 6/7/2023 Yes Reported, Patient     famotidine (PEPCID) 10 MG tablet Take 10 mg by mouth every morning 6/8/2023 Yes Unknown, Entered By History     fluticasone-vilanterol (BREO ELLIPTA) 100-25 MCG/INH inhaler Inhale 1 puff into  the lungs daily 6/8/2023 Yes Adryan James MD     guaiFENesin (MUCINEX) 600 MG 12 hr tablet Take 600 mg by mouth 2 times daily as needed for congestion 6/8/2023 Yes Unknown, Entered By History     ipratropium-albuterol (COMBIVENT RESPIMAT)  MCG/ACT inhaler Inhale 1 puff into the lungs 2 times daily 6/8/2023 Yes Unknown, Entered By History Yes    melatonin 3 MG tablet Take 3 mg by mouth every evening 6/8/2023 Yes Unknown, Entered By History     metoprolol tartrate (LOPRESSOR) 25 MG tablet Take 1 tablet (25 mg) by mouth 2 times daily for 30 days Unknown Yes Jose Silverman MD Yes 7/8/23   miconazole with skin protectant (NETTE ANTIFUNGAL) 2 % CREA cream Apply topically 2 times daily And TID prn 6/8/2023 Yes Unknown, Entered By History     Misc Natural Products (OSTEO BI-FLEX ADV TRIPLE ST) TABS Take 1 capsule by mouth 2 times daily  6/8/2023 Yes Reported, Patient     Multiple Vitamins-Minerals (MULTIVITAMIN ADULT PO) Take one(1) tablet daily. 6/8/2023 Yes Reported, Patient     rivaroxaban ANTICOAGULANT (XARELTO) 20 MG TABS tablet Take 1 tablet (20 mg) by mouth daily (with dinner) Unknown Yes Adryan James MD Yes    EPINEPHrine (ANY BX GENERIC EQUIV) 0.3 MG/0.3ML injection 2-pack Inject 0.3 mLs (0.3 mg) into the muscle as needed for anaphylaxis  at PRN  Antoni Schmidt MD

## 2023-06-09 NOTE — H&P
[facility]  Admission History and Physical Examination    NAME: William Ba   : 1935   MRN: [id number]     Date of Admission:  2023    Assessment & Plan   William Ba is a 88 year old gentleman with hypercholesteremia, CAD, atrial fibrillation/flutter, abdominal aortic aneurysm, prostate cancer, COPD, dementia who lives in a memory care group home. He was just discharged earlier this morning after boarding overnight in the ED for suspected aspiration PNA.   Prior to coming to ED on , he was hypoxic with oxygen saturation decreasing to 78% in the group home where he lives with his wife along with a temperature of 100  F patient was diagnosed with pneumonia on chest x-ray. Patient was registered observation but remained in the ER as a border.  He was able to have his oxygen weaned on the a.m. of .  He was saturating well on room air so discharged with Augmentin.   Once returned to , he was agitated, combative and was difficult to calm. His  was not able to care of him at his present state, so he was brought to ED for evaluation.     #Episode of increased agitation and confusion in the setting of known dementia and new dx of aspiration PNA   -Patient was brought back to the emergency due to increased agitation at the [facility] after being discharged from the hospital earlier today.  -Per daughter this is very uncharacteristic of the patient because he is always very calm and redirectable.  -Suspect his increased agitation is a combination of acute infection, lack of sleep and new surrounding  -Currently patient is very calm and pleasant  -We will have as needed 12.5 mg Seroquel every 6 hours as needed for agitation while he is here in the hospital  -Continue PTA Aricept  - Staff to walk patient, if unsteady, consult PT    #Recent diagnosis of presumed aspiration pneumonia  -Admitted a day ago for respiratory failure with hypoxia due to suspected aspiration pneumonia  -Seen by  speech therapist, recommend regular diet with thin liquids but reduce distractions while eating and skills 3.  Given aspiration risk  -Had been discharged with Augmentin, again received azithromycin this Rocephin in the emergency room, I rather just keep him on Unasyn for now and can transition to PTA Augmentin at discharge  -We will add Mucinex twice daily and DuoNebs 4 times daily    Atrial fibrillation/flutter: Patient was switched from metoprolol succinate to metoprolol tartrate given his meds need to be crushed.  He will continue on Xarelto.     Coronary artery disease  We will continue metoprolol, atorvastatin     Dementia  Lives in memory care unit  We will continue Aricept     Prostate cancer - stable     Awaiting formal pharmacy reconciliation to resume home medications.     DVT Prophylaxis: DOAC  Code Status: DNR / DNI  COVID PCR TESTING STATUS: Negative    Family updated with plan of care: Dtr at bedside       Expected Discharge Date: 06/09/2023               Dedra Delgado PA-C    Primary Care Physician   Abraham Velez    Chief Complaint   Agitation, weakness     History is obtained from the patient's Dtr    Discussed with Dr. Shin in the ED, full chart review including lab work, imaging, and vital signs were reviewed.     History of Present Illness   William Ba is a 88 year old gentleman with hypercholesteremia, CAD, atrial fibrillation/flutter, abdominal aortic aneurysm, prostate cancer, COPD, dementia who lives in a memory care group home. He was just discharged earlier this morning after boarding overnight in the ED for suspected aspiration PNA.   Prior to coming to ED on 6/7, he was hypoxic with oxygen saturation decreasing to 78% in the group home where he lives with his wife along with a temperature of 100  F patient was diagnosed with pneumonia on chest x-ray. Patient was registered observation but remained in the ER as a border.  He was able to have his oxygen weaned on the  a.m. of 6/8.  He was saturating well on room air so discharged with Augmentin.   Once returned to , he was agitated, combative and was difficult to calm. His  was not able to care of him at his present state, so he was brought to ED for evaluation.     Past Medical History    I have reviewed this patient's medical history and updated it with pertinent information if needed.   Past Medical History:   Diagnosis Date     Abdominal aortic aneurysm without rupture (H) 02/25/2016     Atrial fibrillation/flutter      Atrial fibrillation/flutter 11/13/2019     COPD (chronic obstructive pulmonary disease)      Coronary atherosclerosis 01/29/2015     Mixed hyperlipidemia 11/08/2010       Past Surgical History   I have reviewed this patient's surgical history and updated it with pertinent information if needed.  Past Surgical History:   Procedure Laterality Date     COLONOSCOPY       HERNIA REPAIR       REPLACEMENT TOTAL KNEE         Prior to Admission Medications   Prior to Admission Medications   Prescriptions Last Dose Informant Patient Reported? Taking?   EPINEPHrine (ANY BX GENERIC EQUIV) 0.3 MG/0.3ML injection 2-pack  at PRN  No No   Sig: Inject 0.3 mLs (0.3 mg) into the muscle as needed for anaphylaxis   Misc Natural Products (OSTEO BI-FLEX ADV TRIPLE ST) TABS 6/8/2023  Yes Yes   Sig: Take 1 capsule by mouth 2 times daily    Multiple Vitamins-Minerals (MULTIVITAMIN ADULT PO) 6/8/2023  Yes Yes   Sig: Take one(1) tablet daily.   acetaminophen (TYLENOL) 500 MG tablet 6/8/2023  Yes Yes   Sig: Take 1,000 mg by mouth every 8 hours as needed for mild pain   albuterol (PROAIR HFA/PROVENTIL HFA/VENTOLIN HFA) 108 (90 Base) MCG/ACT inhaler 6/8/2023  Yes Yes   Sig: Inhale 2 puffs into the lungs every 4 hours as needed for shortness of breath / dyspnea or wheezing   amoxicillin-clavulanate (AUGMENTIN) 875-125 MG tablet Unknown  No Yes   Sig: Take 1 tablet by mouth 2 times daily for 6 days   atorvastatin (LIPITOR) 40 MG tablet  6/7/2023  No Yes   Sig: Take 1 tablet (40 mg) by mouth At Bedtime   diclofenac (VOLTAREN) 1 % topical gel 6/8/2023  Yes Yes   Sig: Apply 2 g topically 2 times daily   donepezil (ARICEPT) 5 MG tablet 6/7/2023  Yes Yes   Sig: Take 5 mg by mouth every evening   famotidine (PEPCID) 10 MG tablet 6/8/2023  Yes Yes   Sig: Take 10 mg by mouth every morning   fluticasone-vilanterol (BREO ELLIPTA) 100-25 MCG/INH inhaler 6/8/2023  No Yes   Sig: Inhale 1 puff into the lungs daily   guaiFENesin (MUCINEX) 600 MG 12 hr tablet 6/8/2023  Yes Yes   Sig: Take 600 mg by mouth 2 times daily as needed for congestion   ipratropium-albuterol (COMBIVENT RESPIMAT)  MCG/ACT inhaler 6/8/2023  Yes Yes   Sig: Inhale 1 puff into the lungs 2 times daily   melatonin 3 MG tablet 6/8/2023  Yes Yes   Sig: Take 3 mg by mouth every evening   metoprolol tartrate (LOPRESSOR) 25 MG tablet Unknown  No Yes   Sig: Take 1 tablet (25 mg) by mouth 2 times daily for 30 days   miconazole with skin protectant (NETTE ANTIFUNGAL) 2 % CREA cream 6/8/2023  Yes Yes   Sig: Apply topically 2 times daily And TID prn   rivaroxaban ANTICOAGULANT (XARELTO) 20 MG TABS tablet Unknown  No Yes   Sig: Take 1 tablet (20 mg) by mouth daily (with dinner)      Facility-Administered Medications: None     Allergies   Allergies   Allergen Reactions     Bee Swelling     Influenza Virus Vaccine      Methylprednisolone Other (See Comments)     IV steroids causes confusion. PO steroids he tolerates with no issues       Social History   I have reviewed this patient's social history and updated it with pertinent information if needed. William Ba  reports that he quit smoking about 21 years ago. His smoking use included cigarettes. He smoked an average of 1 pack per day. He has never used smokeless tobacco. He reports current alcohol use.    Family History   I have reviewed this patient's family history and updated it with pertinent information if needed.   Family History   Problem  Relation Age of Onset     Heart Disease Mother      Abdominal Aortic Aneurysm Father      Aneurysm Father      Colon Cancer Brother        Review of Systems   The 10 point Review of Systems is negative other than noted in the HPI or here.     Physical Exam   Temp: 98.4  F (36.9  C) Temp src: Oral BP: 115/76 Pulse: 108   Resp: 20 SpO2: 91 % O2 Device: None (Room air)    Vital Signs with Ranges  Temp:  [97.7  F (36.5  C)-98.4  F (36.9  C)] 98.4  F (36.9  C)  Pulse:  [] 108  Resp:  [20] 20  BP: (115-162)/() 115/76  SpO2:  [86 %-98 %] 91 %  0 lbs 0 oz    Constitutional: Awake, alert,  no apparent distress. Pleasantly demented  Eyes: Conjunctiva and pupils examined and normal.  HEENT: Moist mucous membranes, normal dentition.  Respiratory: Clear to auscultation bilaterally, no crackles or wheezing.  Cardiovascular: Regular rate and rhythm, normal S1 and S2, and no murmur noted.  GI: Soft, non-distended, non-tender, bowel sounds present. No rebound tenderness or guarding.  Lymph/Hematologic: No anterior cervical or supraclavicular adenopathy.  Skin: No rashes, no cyanosis, no edema.  Musculoskeletal: No deformities noted.  No erythema or tenderness. Moving all extremities.  Neurologic: No focal deficits noted. Speech is clear. Coordination and strength grossly normal.   Psychiatric: Appropriate affect.    Data   Data reviewed today:      Imaging:   Recent Results (from the past 24 hour(s))   Chest XR,  PA & LAT    Narrative    EXAM: XR CHEST 2 VIEWS  LOCATION: [facility]  DATE/TIME: 6/8/2023 8:35 PM CDT    INDICATION: Cough. Pneumonia.  COMPARISON: 06/07/2023.      Impression    IMPRESSION: Slight worsening of right basilar opacities since previous day. No other significant change. Cannot exclude minimal pleural fluid.         Recent Labs   Lab 06/08/23  1924 06/08/23  0745 06/07/23  1847   WBC 11.3* 8.1 10.0   HGB 11.6* 12.5* 11.7*   MCV 96 95 93    152 151    139 136    POTASSIUM 4.8 4.3 4.3   CHLORIDE 108* 104 102   CO2 23 21* 21*   BUN 22.4 16.0 19.3   CR 0.80 0.72 0.84   ANIONGAP 12 14 13   NIEVES 9.0 8.5* 8.2*   * 121* 111*   ALBUMIN  --   --  3.3*   PROTTOTAL  --   --  6.3*   BILITOTAL  --   --  1.0   ALKPHOS  --   --  83   ALT  --   --  29   AST  --   --  32           Dedra Delgado PA-C  [facility]  June 9, 2023  Securely message with the Vocera Web Console (learn more here)  Text page via [employer] Paging/Directory

## 2023-06-09 NOTE — PROGRESS NOTES
Pt has a sitter 1:1 pt has PNA O2 sats dip into mid 80's, on 3 lpm NC. Gave robitussin for congestion. Pt takes pills crushed in apple sauce. LOST IV access, flyer/VAT paged to start new IV, on blood thinners. Pt has labored breathing, Hx of COPD. NOOB, tried to get upt to bedside commode with assist of 3 - his legs would not bend enough to use bedside commode. Will need to use lift, daughter at bedside, concerned that her father has declined. Page MD William to see family.

## 2023-06-09 NOTE — ED TRIAGE NOTES
"Pt comes in via EMS from NH with reports of \"not acting like himself\" per NH staff. Pt discharged this morning for pnemonia; per ems states he is unsteady. EMS states NH and daughter felt he needed to be re-evaluated. Pt denies any pain, pt has congested cough which is not productive now. Pt answering questions but not oriented x4 but has hx dementia.      Triage Assessment     Row Name 06/08/23 7831       Respiratory WDL    Respiratory WDL rhythm/pattern;cough;mucous membranes    Rhythm/Pattern, Respiratory unlabored;no shortness of breath reported    Mucous Membranes dry    Cough Frequency infrequent    Cough Type congested;nonproductive       Skin Circulation/Temperature WDL    Skin Circulation/Temperature WDL circulation    Skin Circulation no mottling       Cardiac WDL    Cardiac WDL WDL       Cognitive/Neuro/Behavioral WDL    Cognitive/Neuro/Behavioral WDL level of consciousness;arousability;orientation    Level of Consciousness alert;confused    Arousal Level opens eyes spontaneously    Orientation disoriented x 4              "

## 2023-06-09 NOTE — ED NOTES
[facility]  ED Nurse Handoff Report    ED Chief complaint: Generalized Weakness  . ED Diagnosis:   Final diagnoses:   Pneumonia due to infectious organism, unspecified laterality, unspecified part of lung   Unsteady gait       Allergies:   Allergies   Allergen Reactions     Bee Swelling     Influenza Virus Vaccine      Methylprednisolone Other (See Comments)     IV steroids causes confusion. PO steroids he tolerates with no issues       Code Status: Full Code    Activity level - Baseline/Home:  assist of 2.  Activity Level - Current:   assist of 2.   Lift room needed: No.   Bariatric: No   Needed: No   Isolation: No.   Infection: Not Applicable.     Respiratory status: Room air    Vital Signs (within 30 minutes):   Vitals:    06/08/23 1910 06/08/23 2035 06/08/23 2259 06/08/23 2330   BP: 120/72 124/82 (!) 118/98    Pulse: 73  110    Resp: 20 20     Temp: 98.4  F (36.9  C)      TempSrc: Oral      SpO2: 92% 97%  92%       Cardiac Rhythm:  ,      Pain level:    Patient confused: Yes.   Patient Falls Risk: bed/chair alarm on, nonskid shoes/slippers when out of bed, activity supervised, and safety sitter at bedside .   Elimination Status:  male external catheter      Patient Report - Initial Complaint: weakness.   Focused Assessment:    The history is provided by a caregiver, the patient and medical records.      William Ba is a 88 year old gentleman with hypercholesteremia, CAD, atrial fibrillation/flutter, abdominal aortic aneurysm, prostate cancer, COPD, dementia who lives in a memory care group home. The patient was seen here in the ED yesterday for pneumonia and was admitted for observation overnight. After he returned to the care facility where he resides, care staff and family members found him to be very weak and not acting like his usual self. The patient denies having suffered any falls.     Independent Historian:   History provided by the patient's caregivers, family, and  medical records as noted in HPI.     Review of External Notes:   I reviewed the patient's discharge summary from today.     Medications:    Aricept  Breo Ellipta  Duoneb   Lipitor  Pepcid   Pro-air  Toprol   Xarelto      Past Medical History:    AAA without rupture   Acute CVA  Atrial fibrillation/flutter  CAP  Cataract of both eyes  Contracture of palmar fascia  COPD  Coronary atherosclerosis   Generalized osteoarthrosis   Hepatitis   Herpes zoster   Hiatal hernia  Hyperplasia of prostate  Impotence of organic origin   Mixed hyperlipidemia   Pneumothorax, left  Presbylarynx  Prostate cancer  Renal stones   Schatzki's ring  SVT  Unilateral inguinal hernia with obstruction, right   Vitamin D deficiency      Past Surgical History:    Colonoscopy, multiple  EGD, multiple  Inguinal hernia repair, right  TKA, right     Physical Exam      Patient Vitals for the past 24 hrs:    BP Temp Temp src Pulse Resp SpO2   06/08/23 2035 124/82 -- -- -- 20 97 %   06/08/23 1910 120/72 98.4  F (36.9  C) Oral 73 20 92 %         Physical Exam  Constitutional: Vital signs reviewed as above  General: Alert, pleasant  HEENT: Moist mucous membranes  Eyes: Pupils are equal, round, and reactive to light.   Neck: Normal range of motion  Cardiovascular: normal rate, Regular rhythm and normal heart sounds.  No MRG  Pulmonary/Chest: Effort normal and breath sounds normal. No respiratory distress. Patient has no wheezes. Patient has no rales.   Gastrointestinal: Soft. Positive bowel sounds. No MRG.  Musculoskeletal/Extremities: Full ROM.  Endo: No pitting edema  Neurological: Alert, no focal deficits.  Skin: Skin is warm and dry.   Psychiatric: Pleasant, confused.    Abnormal Results:   Labs Ordered and Resulted from Time of ED Arrival to Time of ED Departure   BASIC METABOLIC PANEL - Abnormal       Result Value    Sodium 143      Potassium 4.8      Chloride 108 (*)     Carbon Dioxide (CO2) 23      Anion Gap 12      Urea Nitrogen 22.4       Creatinine 0.80      Calcium 9.0      Glucose 144 (*)     GFR Estimate 86     CBC WITH PLATELETS AND DIFFERENTIAL - Abnormal    WBC Count 11.3 (*)     RBC Count 3.80 (*)     Hemoglobin 11.6 (*)     Hematocrit 36.4 (*)     MCV 96      MCH 30.5      MCHC 31.9      RDW 13.7      Platelet Count 153      % Neutrophils 85      % Lymphocytes 7      % Monocytes 7      % Eosinophils 0      % Basophils 0      % Immature Granulocytes 1      NRBCs per 100 WBC 0      Absolute Neutrophils 9.6 (*)     Absolute Lymphocytes 0.8      Absolute Monocytes 0.8      Absolute Eosinophils 0.0      Absolute Basophils 0.0      Absolute Immature Granulocytes 0.1      Absolute NRBCs 0.0     ISTAT GASES LACTATE VENOUS POCT - Abnormal    Lactic Acid POCT 3.5 (*)     Bicarbonate Venous POCT 24      O2 Sat, Venous POCT 55 (*)     pCO2 Venous POCT 41      pH Venous POCT 7.38      pO2 Venous POCT 30     LACTIC ACID WHOLE BLOOD - Normal    Lactic Acid 1.9     ROUTINE UA WITH MICROSCOPIC REFLEX TO CULTURE   BLOOD CULTURE   BLOOD CULTURE        Chest XR,  PA & LAT   Final Result   IMPRESSION: Slight worsening of right basilar opacities since previous day. No other significant change. Cannot exclude minimal pleural fluid.             Treatments provided: IV abx, fluids  Family Comments: daughter left home  OBS brochure/video discussed/provided to patient:  Yes  ED Medications:   Medications   azithromycin 500 mg (ZITHROMAX) in 0.9% NaCl 250 mL intermittent infusion 500 mg (500 mg Intravenous $New Bag 6/8/23 2243)   0.9% sodium chloride BOLUS (0 mLs Intravenous Stopped 6/8/23 2243)   cefTRIAXone (ROCEPHIN) 2 g vial to attach to  ml bag for ADULTS or NS 50 ml bag for PEDS (0 g Intravenous Stopped 6/8/23 3167)       Drips infusing:  Yes  For the majority of the shift this patient was Yellow.   Interventions performed were safety sitter.    Sepsis treatment initiated: No    Cares/treatment/interventions/medications to be completed following ED care:  NA    ED Nurse Name: Ana Donald RN  11:56 PM  RECEIVING UNIT ED HANDOFF REVIEW    Above ED Nurse Handoff Report was reviewed: Yes  Reviewed by: Sharyn Mcmahon RN on June 9, 2023 at 5:01 PM

## 2023-06-09 NOTE — PLAN OF CARE
ROOM # 224    Living Situation (if not independent, order SW consult): LTC,   Facility name: [facility]  : eugenia Khan    Activity level at baseline: Independent  Activity level on admit: Assist of two    Who will be transporting you at discharge: Erin, daughter    Patient registered to observation; given Patient Bill of Rights; given the opportunity to ask questions about observation status and their plan of care.  Patient has been oriented to the observation room, bathroom and call light is in place-Yes.    Discussed discharge goals and expectations with patient/family-Yes.

## 2023-06-09 NOTE — ED NOTES
Pt returns from scan. Writer hooks pt up to BP cuff and pulse ox. Pt is confused. Daughter at bedside and very helpful with the pt in redirecting.

## 2023-06-09 NOTE — PROGRESS NOTES
ED requested SW contact [facility] ([phone]) upon discharge.  Hospice requests med list, face sheet, and HP upon discharge faxed to [phone].  BA spoke with Aaliyah at [facility] and she said she was contacted by patient's daughter Kamala and   [facility] staff in [place], to provide hospice services upon discharge.  If discharge occurs on weekend SW to contact [phone].

## 2023-06-09 NOTE — PLAN OF CARE
[facility]    ED Boarding Nurse Handoff Addendum Report:    Date/time: 6/9/2023, 0700  Activity Level: in bed and very restless, sitter at bedside.     Fall Risk: Yes:  bed/chair alarm on, patient and family education, and mobility aid in reach    Active Infusions: none at this time.     Current Meds Due: none    Current care needs: pt is incontinent of bladder, purewick placed. O2 at 2L, sats mid 90s. Sitter at bedside as pt is agitated & confused, trying to get oob, not very redirectable.     Oxygen requirements (liters/min and/or FiO2): 2L    Respiratory status: Nasal cannula    Vital signs (within last 30 minutes):    Vitals:    06/09/23 0000 06/09/23 0150 06/09/23 0300 06/09/23 0414   BP: 115/76  (!) 153/79 (!) 136/97   BP Location:    Left arm   Pulse: 108  107 106   Resp:    (!) 32   Temp:    97.7  F (36.5  C)   TempSrc:    Oral   SpO2: 91% 95%         Focused assessment within last 30 minutes: breathing slightly better, but monitor closely. Pt remains wheezy, SOB w/ wheezy, coarse lung sounds. Encouraeg coughing & TCDB.     ED Boarding Nurse name: Zora Ambrosio RN

## 2023-06-09 NOTE — ED PROVIDER NOTES
History     Chief Complaint:  Generalized Weakness       The history is provided by a caregiver, the patient and medical records.      William Ba is a 88 year old gentleman with hypercholesteremia, CAD, atrial fibrillation/flutter, abdominal aortic aneurysm, prostate cancer, COPD, dementia who lives in a memory care group home. The patient was seen here in the ED yesterday for pneumonia and was admitted for observation overnight. After he returned to the care facility where he resides, care staff and family members found him to be very weak and not acting like his usual self. The patient denies having suffered any falls.    Independent Historian:   History provided by the patient's caregivers, family, and medical records as noted in HPI.    Review of External Notes:   I reviewed the patient's discharge summary from today.    Medications:    Aricept  Breo Ellipta  Duoneb   Lipitor  Pepcid   Pro-air  Toprol   Xarelto      Past Medical History:    AAA without rupture   Acute CVA  Atrial fibrillation/flutter  CAP  Cataract of both eyes  Contracture of palmar fascia  COPD  Coronary atherosclerosis   Generalized osteoarthrosis   Hepatitis   Herpes zoster   Hiatal hernia  Hyperplasia of prostate  Impotence of organic origin   Mixed hyperlipidemia   Pneumothorax, left  Presbylarynx  Prostate cancer  Renal stones   Schatzki's ring  SVT  Unilateral inguinal hernia with obstruction, right   Vitamin D deficiency      Past Surgical History:    Colonoscopy, multiple  EGD, multiple  Inguinal hernia repair, right  TKA, right     Physical Exam     Patient Vitals for the past 24 hrs:   BP Temp Temp src Pulse Resp SpO2   06/08/23 2035 124/82 -- -- -- 20 97 %   06/08/23 1910 120/72 98.4  F (36.9  C) Oral 73 20 92 %        Physical Exam  Constitutional: Vital signs reviewed as above  General: Alert, pleasant  HEENT: Moist mucous membranes  Eyes: Pupils are equal, round, and reactive to light.   Neck: Normal range of  motion  Cardiovascular: normal rate, Regular rhythm and normal heart sounds.  No MRG  Pulmonary/Chest: Effort normal and breath sounds normal. No respiratory distress. Patient has no wheezes. Patient has no rales.   Gastrointestinal: Soft. Positive bowel sounds. No MRG.  Musculoskeletal/Extremities: Full ROM.  Endo: No pitting edema  Neurological: Alert, no focal deficits.  Skin: Skin is warm and dry.   Psychiatric: Pleasant, confused.    Emergency Department Course       Imaging:  Chest XR,  PA & LAT   Final Result   IMPRESSION: Slight worsening of right basilar opacities since previous day. No other significant change. Cannot exclude minimal pleural fluid.            Report per radiology    Laboratory:  Labs Ordered and Resulted from Time of ED Arrival to Time of ED Departure   BASIC METABOLIC PANEL - Abnormal       Result Value    Sodium 143      Potassium 4.8      Chloride 108 (*)     Carbon Dioxide (CO2) 23      Anion Gap 12      Urea Nitrogen 22.4      Creatinine 0.80      Calcium 9.0      Glucose 144 (*)     GFR Estimate 86     CBC WITH PLATELETS AND DIFFERENTIAL - Abnormal    WBC Count 11.3 (*)     RBC Count 3.80 (*)     Hemoglobin 11.6 (*)     Hematocrit 36.4 (*)     MCV 96      MCH 30.5      MCHC 31.9      RDW 13.7      Platelet Count 153      % Neutrophils 85      % Lymphocytes 7      % Monocytes 7      % Eosinophils 0      % Basophils 0      % Immature Granulocytes 1      NRBCs per 100 WBC 0      Absolute Neutrophils 9.6 (*)     Absolute Lymphocytes 0.8      Absolute Monocytes 0.8      Absolute Eosinophils 0.0      Absolute Basophils 0.0      Absolute Immature Granulocytes 0.1      Absolute NRBCs 0.0     ISTAT GASES LACTATE VENOUS POCT - Abnormal    Lactic Acid POCT 3.5 (*)     Bicarbonate Venous POCT 24      O2 Sat, Venous POCT 55 (*)     pCO2 Venous POCT 41      pH Venous POCT 7.38      pO2 Venous POCT 30     LACTIC ACID WHOLE BLOOD - Normal    Lactic Acid 1.9     ROUTINE UA WITH MICROSCOPIC REFLEX TO  CULTURE   BLOOD CULTURE   BLOOD CULTURE        Emergency Department Course & Assessments:    Interventions:  Medications   azithromycin 500 mg (ZITHROMAX) in 0.9% NaCl 250 mL intermittent infusion 500 mg (500 mg Intravenous $New Bag 6/8/23 2243)   0.9% sodium chloride BOLUS (0 mLs Intravenous Stopped 6/8/23 2243)   cefTRIAXone (ROCEPHIN) 2 g vial to attach to  ml bag for ADULTS or NS 50 ml bag for PEDS (2 g Intravenous $New Bag 6/8/23 2210)        Assessments:  1912 I obtained history and examined the patient.  2000 I rechecked the patient and spoke with the patient's daughter.  2030 I rechecked the patient.    Independent Interpretation (X-rays, CTs, rhythm strip):  There is slight worsening of the right basilar opacity from the x-ray from yesterday per my read.    Social Determinants of Health affecting care:   None    Disposition:  The patient was admitted to the hospital under the care of Dr. العلي.     Impression & Plan      Medical Decision Making:  Patient represents to the emergency department to be discharged today.  He was seen last night and found to have community-acquired pneumonia and was started on azithromycin Rocephin.  He was hypoxic but had a normal white count and lactate.  He was seemingly better today and started on Augmentin with concerns for possible aspiration as well.  He went back to his facility but unfortunately has been unsteady with his gait and they do not feel comfortable taking care of him there as they feel he has since declined.  He was not markedly hypoxic here as his O2 sats were 90-92.  He does not appear to be in any distress.  Interestingly his VBG did come back with a PO2 of 55 and a lactate of 3.5.  I do question this as his oxygen saturations were 90% at the time.  Fluids have been started and his repeat lactate was down to 1.9.  However, his x-ray does show slight increase in his opacity which is to be expected given that he is only been on antibiotics for 24 hours.   I discussed the case with the pharmacist and we decided do another round of azithromycin Rocephin since to be staying in the abs unit tonight.  He will be brought in there under the care of Dr. العلي.  Again he is not hypoxic here, he does have a slight bump in his white count now, and his lactate normalized with fluids.    Diagnosis:    ICD-10-CM    1. Pneumonia due to infectious organism, unspecified laterality, unspecified part of lung  J18.9       2. Unsteady gait  R26.81            Discharge Medications:  New Prescriptions    No medications on file        Scribe Disclosure:  PARISH, Twyla Diaz, am serving as a scribe at 7:09 PM on 6/8/2023 to document services personally performed by Farhan Ken MD based on my observations and the provider's statements to me.        Farhan Ken MD  06/08/23 4382

## 2023-06-10 NOTE — UTILIZATION REVIEW
Admission Status; Secondary Review Determination       Under the authority of the Utilization Management Committee, the utilization review process indicated a secondary review on the above patient. The review outcome is based on review of the medical records, discussions with staff, and applying clinical experience noted on the date of the review.     (x) Inpatient Status Appropriate - This patient's medical care is consistent with medical management for inpatient care and reasonable inpatient medical practice.     RATIONALE FOR DETERMINATION    88-year-old male with a history of coronary artery disease, atrial fibrillation, prostate cancer, COPD, and dementia, residing in a memory care group home, was admitted for increased agitation in the setting of a new diagnosis of aspiration pneumonia. The patient had recently been discharged from the emergency room for the same condition, but his agitation had escalated. Work-up revealed acute infectious encephalopathy, likely attributed to his dementia. Supportive care and redirection techniques were implemented, along with medication management using Seroquel for agitation. The patient also experienced acute respiratory failure with hypoxia due to aspiration pneumonia, requiring oxygen support and nebulization. IV diuresis with Lasix was effective in improving his breathing. Management of comorbidities included continuation of medications for atrial fibrillation and coronary artery disease. The family expressed interest in palliative and hospice care, prompting a consultation to discuss options further.  Patient requires inpatient admission versus short stay observation or outpatient treatment for the following reasons:  the presence of aspiration pneumonia and acute respiratory failure necessitates close monitoring, oxygen support, and antibiotic treatment. Secondly, the patient's increased agitation and acute infectious encephalopathy associated with dementia require  ongoing management and interventions to ensure patient safety and comfort. Lastly, the patient's advanced age and multiple comorbidities increase the risk of adverse outcomes, such as complications related to respiratory distress, infectious complications, and potential cognitive and functional decline.  The expected length of stay at the time of admission was more than 2 nights because of the severity of illness, intensity of service provided, and risk for adverse outcome. Inpatient admission is appropriate.     Dr. William notified    This document was produced using voice recognition software       The information on this document is developed by the utilization review team in order for the business office to ensure compliance. This only denotes the appropriateness of proper admission status and does not reflect the quality of care rendered.   The definitions of Inpatient Status and Observation Status used in making the determination above are those provided in the CMS Coverage Manual, Chapter 1 and Chapter 6, section 70.4.   Sincerely,   ERWIN PEREYRA MD   System Medical Director   Utilization Management   [facility].

## 2023-06-10 NOTE — PROGRESS NOTES
PRIMARY DIAGNOSIS:  Unsteady Gait  OUTPATIENT/OBSERVATION GOALS TO BE MET BEFORE DISCHARGE:  1. ADLs back to baseline: No    2. Activity and level of assistance: Up with maximum assistance. Consider SW and/or PT evaluation.     3. Pain status: Pain free.    4. Return to near baseline physical activity: No     Discharge Planner Nurse   Safe discharge environment identified: Yes  Barriers to discharge: No       Entered by: Lito Guerra RN 06/10/2023 11:02 AM     Please review provider order for any additional goals.   Nurse to notify provider when observation goals have been met and patient is ready for discharge.

## 2023-06-10 NOTE — PROVIDER NOTIFICATION
"Provider Notification/Transfer:  Pt admitted with acute respiratory failure with hypoxia/recent aspiration pneumonia. He has been having inspiratory wheezes. Lung sounds diminished. He was noted to be more congested and dyspneic. Duoneb administered by RT. RT suggesting BiPAP. On oxygen at 2 liters via oxy mask. Unable to take the Robitussin earlier.    /86 (BP Location: Left arm)   Pulse 92   Temp 98.1  F (36.7  C) (Axillary)   Resp 28   Ht 1.854 m (6' 1\")   SpO2 94%   BMI 21.11 kg/m     Web page sent to on call cross cover requesting pt re-evaluation. Cathy CERON MD came to unit evaluated pt and recommended transfer to 3rd floor. Report given to Chantale GAGE RN receiving nurse and pt was escorted to room 342.   "

## 2023-06-10 NOTE — PROGRESS NOTES
Patient Transfer Information  Patient connected to monitoring equipment on arrival: yes Vital signs monitor, Continuous pulse oximetry and BiPAP, tele     Patient connected to wall oxygen on arrival: Yes    Belongings: Transferred with patient    Safety check completed: Yes

## 2023-06-10 NOTE — PLAN OF CARE
Goal Outcome Evaluation:   Plan of Care reviewed with: daughter Erin (at bedside)   Overall patient progress: not progressing     Vitals: q2h per IMC orders   Tele: A-fib/A-flutter CVR at time of writing   Pain: PAINAD scale  Notable Events: Pt transferred from Obs around 2200, came with family members. Daughter at bedside overnight to help with keeping pt calm. Sitter in place for impulsivity, agitation, pulling at bipap. PRN ativan given for agitation and anxiety x2. Strict NPO d/t aspiration pneumonia, no PO meds given as pt unable to tolerate being off bipap. Sitter present until 0330, daughter remained at bedside - trialed without sitter, pt grabbing at bipap, continue 1:1.   Plan: continue plan of care

## 2023-06-10 NOTE — PLAN OF CARE
"Speech Language Therapy Discharge Summary     Reason for therapy discharge:    Discharged to [facility]     Progress towards therapy goal(s). See goals on Care Plan in Epic electronic health record for goal details.  Goals partially met.  Barriers to achieving goals:   discharge from facility.     Therapy recommendation(s):    Per last SLP \"Reduce distractions while eating; Okay to continue regular diet and thin liquids with increased use of reflux strategies: fully upright, small bites and sips, slow rate, consider smaller/more frequent snacks instead of large meals and stay upright for 60 minutes after eating. Continue assist as needed. Hold if overt s/sx of aspiration\"    "

## 2023-06-10 NOTE — PROGRESS NOTES
"A BiPAP of  14/7 @ 30% was applied to the pt via the mask for an increase in WOB and/or SOB. The bridge of the nose looks good and remains intact. Pt is tolerating it well. Will continue to monitor and assess the pt's current respiratory status and needs.    /86 (BP Location: Left arm)   Pulse 118   Temp 98.1  F (36.7  C) (Axillary)   Resp 26   Ht 1.854 m (6' 1\")   SpO2 97%   BMI 21.11 kg/m        Arvin Burciaga, RT    "

## 2023-06-10 NOTE — PLAN OF CARE
"Speech Language Therapy Discharge Summary    Reason for therapy discharge:    Discharged to [facility]    Progress towards therapy goal(s). See goals on Care Plan in Epic electronic health record for goal details.  Goals partially met.  Barriers to achieving goals:   discharge from facility.    Therapy recommendation(s):    Per last SLP \"Reduce distractions while eating; Okay to continue regular diet and thin liquids with increased use of reflux strategies: fully upright, small bites and sips, slow rate, consider smaller/more frequent snacks instead of large meals and stay upright for 60 minutes after eating. Continue assist as needed. Hold if overt s/sx of aspiration\"        "

## 2023-06-10 NOTE — PROVIDER NOTIFICATION
"Paged admit cross cover @6059: \"Pt on bipap and has aspiration pneumonia in hospital problems for this stay - family also reports he has issues swallowing. Make strict NPO?  - Chantale BRENNER\"    Paged admit cross cover @3777: \"Pt strict NPO, no maintenance fluids ordered, urine [place] in color - start fluids?  - Chantale BRENNER\"  "

## 2023-06-10 NOTE — PROGRESS NOTES
Respiratory Therapy Note        An ABG was drawn from the Pt's right radial artery @ 16:05 on an FIO2 of 21%/room air.  Pressure was held until bleeding stopped.  No complications noted during the procedure.    Teresa 10, 2023 4:11 PM  Nicola Quesada, RT

## 2023-06-11 NOTE — PLAN OF CARE
"Goal Outcome Evaluation:    Vitals: /75 (BP Location: Left arm)   Pulse 90   Temp 98.5  F (36.9  C) (Axillary)   Resp 20   Ht 1.854 m (6' 1\")   Wt 74.4 kg (164 lb 0.4 oz)   SpO2 93%   BMI 21.64 kg/m      IMC    Primary DX:  Aspiration PNA  Orientation: Oriented to self only. Was able to identify daughter at bedside earlier in evening.  Pain:  Denies. Pain Ad was 0.   Anticoagulant: Xarelto  Neuro: Intact  Respiratory: 1L/NC. Shallow respiration. LS diminished. Infrequent productive cough. No sputum seen.   Cardiac/Tele: A-Fib CVR  Bowel Sounds: +x4Q. ABD soft, non-tender.   GI/: Incont urine. External cath. No bm this shift.   Skin: See flow-sheet  LDA: L PIV SL. IV Unasyn   Diet: Low Fat / < 2400 mg Na  Activity: AX2-3  Followed By/Consults: Palliative / Soc wk consult  Plan of care was reviewed with: Daughter Erin and patient  Overall patient progress stable this shift: Yes  Bed alarm on: Yes  Safety checks completed: Yes  Plan: 2 days pending improvement.     Cares are clustered at night to promote rest and help reduce falls for the patient.                           "

## 2023-06-11 NOTE — PLAN OF CARE
"Goal Outcome Evaluation:      Plan of Care Reviewed With: patient    Overall Patient Progress: improvingOverall Patient Progress: improving    Blood pressure 120/61, pulse 110, temperature 97.7  F (36.5  C), temperature source Oral, resp. rate 18, height 1.854 m (6' 1\"), weight 72.2 kg (159 lb 2.8 oz), SpO2 96 %.    VSS other than patient tachycardic.  Pt Alert to self by end of shift.  Pt confused. Pt on O2 at 1 Lpm via Nasal cannula.  Pt denies pain.  Off BiPAP.  Pt drinking sips of water and had some applesauce but declined a meal.  PIV in left arm SL.  External catheter changed and in place.  Pt heavy assist x2 with GB to pivot to commode.  Provider notified regarding finger tips appearing cyanotic.  ABGs ordered.  Afib RVR on tele.  Echo completed with EF of 55%.  Unasyn Q6.  Continue with POC.         "

## 2023-06-11 NOTE — PROGRESS NOTES
"[facility] RCAT     Date: 6/11/2023  Admission Dx: aspiration pneumonia  Pulmonary History COPD  Home Nebulizer/MDI Use: albuterol MDI Q4 prn, Breo QD  Home Oxygen: N/A  Acuity Level (RCAT flow sheet): 3  Aerosol Therapy initiated: continue Duoneb QID      Pulmonary Hygiene initiated: deep breath and coughing techniques      Volume Expansion initiated: IS      Current Oxygen Requirements: 1 lpm  Current SpO2: 94%    Re-evaluation date: 6/14/2023    Patient Education: benefits of bronchodilators      See \"RT Assessments\" flow sheet for patient assessment scoring and Acuity Level Details.               "

## 2023-06-11 NOTE — PROGRESS NOTES
"[facility]    Hospitalist Progress Note  Name: William Ba    MRN: [id number]  Provider: Little William MD  Date of Service: 06/11/2023    Assessment & Plan   Summary of Stay: William Ba is a 88 year old male who was admitted on 6/8/2023 for increased agitation in setting of new diagnosis of aspiration pneumonia.  Patient's past medical history significant for coronary artery disease, atrial fibrillation, prostate cancer, COPD, dementia, hyperlipidemia lives in a memory care group home.  He was recently discharged from emergency room for aspiration pneumonia on Augmentin.  But returned with increased agitation.    6/10/2023.  Patient is increased work of breathing improved after IV diuresis and overnight required BiPAP which helped his breathing.  Currently on 2 L satting well and breathing comfortably        Recent agitation in setting of known dementia  Acute infectious encephalopathy  -In the emergency room patient appears to be, started on Seroquel 12.5 mg every 6 hours as needed  -Supportive care  -Redirection  -UA with evidence of hematuria but no UTI      Acute Resp failure with hypoxia  Recent diagnosis of aspiration pneumonia  -A day prior to most recent hospitalization patient was admitted for acute respiratory failure.  Oxygen needs were down to baseline was discharged home on room air oxygenation  -Speech therapist was consulted recommended regular diet with thin liquids but reduce distraction while eating  -Patient received a dose of ceftriaxone and azithromycin in emergency room.  Was switched to Unasyn given aspiration pneumonia  -Continue on nebs and Mucinex  - requiring increased o2, tachypneic  - on exam has crackles   - iv lasixs 40mg X1 6/10/2023  - Iv ativan for anxiety  - discontinue fluids  - daily weight  - d/w pt's daughter who is a nurse  -Patient responded well to IV Lasix  -Echocardiogram 6/10/2023 within normal limits.  EF of 55% some \"basal inferior " "hypokinesia\"  Atrial fibrillation and flutter  -Continue metoprolol  -Continue Xarelto  -Continue wean O2    Coronary artery disease  -On metoprolol, atorvastatin  -Shows basal inferior hypokinesia    Known history of dementia  -Lives in memory care  -Continue Aricept  -Family wanted to know more about palliative care and hospice care.  -Consult palliative to discuss regarding hospice care      DVT Prophylaxis: Pneumatic Compression Devices  Code Status: No CPR- Do NOT Intubate    Disposition: Expected discharge at least 2 days pending improvement      Interval History   Reviewed chart.  has underlying dementia at baseline cannot complete 10 point ROS.    -Data reviewed today: I reviewed all new labs and imaging reports over the last 24 hours. I personally reviewed no images or EKG's today.    Physical Exam   Temp: 98.6  F (37  C) Temp src: Axillary BP: (!) 150/80 Pulse: 94   Resp: 21 SpO2: 99 % O2 Device: Nasal cannula Oxygen Delivery: 1 LPM  Vitals:    06/10/23 0611 06/11/23 0608   Weight: 72.2 kg (159 lb 2.8 oz) 74.4 kg (164 lb 0.4 oz)     Vital Signs with Ranges  Temp:  [97.1  F (36.2  C)-98.6  F (37  C)] 98.6  F (37  C)  Pulse:  [] 94  Resp:  [18-21] 21  BP: (107-150)/(61-88) 150/80  SpO2:  [92 %-100 %] 99 %  I/O last 3 completed shifts:  In: -   Out: 1075 [Urine:1075]      GEN: Sitting breathing comfortably.  Cannot assess for orientation   HEENT:  Normocephalic/atraumatic, no scleral icterus, no nasal discharge, mouth dry.  CV :tachycardic no murmur or JVD.  S1 + S2 noted, no S3 or S4.  LUNGS:  Bibasilar crackles,  Symmetric chest rise on inhalation noted.  ABD:  Active bowel sounds, soft, non-tender/non-distended.  No rebound/guarding/rigidity.  EXT:  No edema.  No cyanosis.    SKIN:  Dry to touch, no exanthems noted in the visualized areas.    Medications     - MEDICATION INSTRUCTIONS -       - MEDICATION INSTRUCTIONS -         ampicillin-sulbactam  3 g Intravenous Q6H     atorvastatin  40 mg Oral At " Bedtime     diclofenac  2 g Topical BID     donepezil  5 mg Oral QPM     famotidine  10 mg Oral QAM     guaiFENesin  200 mg Oral Q4H     ipratropium - albuterol 0.5 mg/2.5 mg/3 mL  3 mL Nebulization 4x daily     metoprolol tartrate  25 mg Oral BID     rivaroxaban ANTICOAGULANT  20 mg Oral Daily with supper     senna-docusate  1 tablet Oral BID    Or     senna-docusate  2 tablet Oral BID     sodium chloride (PF)  3 mL Intracatheter Q8H     Data     Recent Labs   Lab 06/10/23  0740 06/08/23 1924 06/08/23  0745   WBC 8.2 11.3* 8.1   HGB 11.9* 11.6* 12.5*   HCT 37.1* 36.4* 38.6*   MCV 95 96 95    153 152     Recent Labs   Lab 06/10/23  0740 06/08/23 1924 06/08/23  0745    143 139   POTASSIUM 3.8 4.8 4.3   CHLORIDE 107 108* 104   CO2 26 23 21*   ANIONGAP 9 12 14   GLC 90 144* 121*   BUN 19.5 22.4 16.0   CR 0.86 0.80 0.72   GFRESTIMATED 84 86 88   NIEVES 8.6* 9.0 8.5*     7-Day Micro Results     Collected Updated Procedure Result Status      06/08/2023 2011 06/10/2023 2346 Blood Culture Peripheral Blood [[id number]]   Peripheral Blood    Preliminary result Component Value   Culture No growth after 2 days  [P]                06/08/2023 2011 06/10/2023 2346 Blood Culture Line, venous [[id number]]   Blood from Line, venous    Preliminary result Component Value   Culture No growth after 2 days  [P]                06/07/2023 1858 06/07/2023 1945 Symptomatic Influenza A/B, RSV, & SARS-CoV2 PCR (COVID-19) Nasopharyngeal [[id number]]    Swab from Nasopharyngeal    Final result Component Value   Influenza A PCR Negative   Influenza B PCR Negative   RSV PCR Negative   SARS CoV2 PCR Negative   NEGATIVE: SARS-CoV-2 (COVID-19) RNA not detected, presumed negative.                Recent Labs   Lab 06/07/23  1847   AST 32   ALT 29   ALKPHOS 83   BILITOTAL 1.0     No results for input(s): INR in the last 168 hours.  Recent Labs   Lab 06/10/23  0740 06/08/23 2011 06/08/23  1933   LACT 1.0 1.9 3.5*     No results for  input(s): LIPASE in the last 168 hours.  No results for input(s): TROPONIN, TROPI, TROPR, TROPONINIS in the last 168 hours.    Invalid input(s): TROPT, TROP, TROPONINIES, TNIH  Recent Labs   Lab 23  1150   COLOR Orange*   APPEARANCE Slightly Cloudy*   URINEGLC Negative   URINEBILI Negative   URINEKETONE Negative   SG 1.030   UBLD Large*   URINEPH 5.5   PROTEIN 50*   NITRITE Negative   LEUKEST Negative   RBCU >182*   WBCU 1       Recent Results (from the past 24 hour(s))   Echocardiogram Complete   Result Value    LVEF  55%    [facility]    [id number]  [id number]  [id number]  [id number]^NAZ^RAHUL     [facility]  Echocardiography Laboratory  [address] [place] [address]     Name: AURELIANO KAUFMAN  MRN: [id number]  : 1935  Study Date: 06/10/2023 02:12 PM  Age: 88 yrs  Gender: Male  Patient Location: [facility]  Reason For Study: SOB  Ordering Physician: NAZ CANTU  Referring Physician: Abraham Velez  Performed By: Angus Torres RDCS     BSA: 1.9 m2  Height: 73 in  Weight: 141 lb  HR: 101  BP: 130/78 mmHg  ______________________________________________________________________________  Procedure  Complete Portable Echo Adult. Optison (NDC #7311-4355) given intravenously.  ______________________________________________________________________________  Interpretation Summary     1. The left ventricle is normal in size. The visual ejection fraction is  estimated at 55%. Basal inferior hypokinesis.  2. The right ventricle is normal in structure, function and size.  3. No valve disease.     Outside echo from 2019 reported EF 50%, no valve disease.     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  ______________________________________________________________________________  Left Ventricle  The left ventricle is normal in size. There is normal left ventricular wall  thickness. The visual ejection fraction is  estimated at 55%. Left ventricular  diastolic function is indeterminate. Basal inferior hypokinesis.     Right Ventricle  The right ventricle is normal in structure, function and size.     Atria  Normal left atrial size. Right atrial size is normal. There is no atrial shunt  seen.     Mitral Valve  The mitral valve is normal in structure and function.     Tricuspid Valve  No tricuspid regurgitation.     Aortic Valve  The aortic valve is normal in structure and function.     Pulmonic Valve  The pulmonic valve is normal in structure and function.     Vessels  Normal IVC size with reduced respiratory collapse.     Pericardium  There is no pericardial effusion.     Rhythm  The rhythm was atrial fibrillation.  ______________________________________________________________________________  MMode/2D Measurements & Calculations  IVSd: 0.77 cm     LVIDd: 5.4 cm  LVIDs: 4.3 cm  LVPWd: 0.60 cm  IVC diam: 1.7 cm  FS: 20.2 %  LV mass(C)d: 129.0 grams  LV mass(C)dI: 69.5 grams/m2  Ao root diam: 3.6 cm  asc Aorta Diam: 3.5 cm  LVOT diam: 2.4 cm  LVOT area: 4.5 cm2  LA Volume (BP): 54.3 ml  LA Volume Index (BP): 29.4 ml/m2  RWT: 0.22  TAPSE: 1.6 cm     Doppler Measurements & Calculations  MV E max andriy: 71.7 cm/sec  PA acc time: 0.09 sec  E/E' av.6  Lateral E/e': 5.6  Medial E/e': 7.6  RV S Andriy: 9.0 cm/sec     ______________________________________________________________________________  Report approved by: Alexis Glasgow 06/10/2023 03:05 PM

## 2023-06-12 NOTE — CONSULTS
Palliative Care Consultation Note  [facility]      Patient: William Ba  Date of Admission:  6/8/2023    Requesting Clinician / Team: Hospital medicine  Reason for consult: Goals of care     Recommendations & Counseling     GOALS OF CARE:     Transition to Comfort focused    Start comfort care while inpatient; continue with antibiotics after discussion with family.     Discharge back to [facility] with [facility]    ADVANCE CARE PLANNING:    No health care directive on file. Per  informed consent policy next of kin should be involved if patient becomes unable.    There is a POLST form which is reviewed and current.    Code status: No CPR- Do NOT Intubate    MEDICAL MANAGEMENT:   Comfort Care      #Pain  - 1st choice:Morphine PO/SL 5-10 mg q 1 hour as needed.   - 2nd choice:Morphine IV 1-2 mg q 15 minutes as needed   - Adjunct: Tylenol SD                                            or   - 1st choice: hydromorphone PO 1-2 mg q 1 hour as needed.   - 2nd choice:hydromorphone IV 0.3-0.5 mg q 15 minutes as needed     If unable to take PO and morphine, consider oxycodone SL 5-10 q 1 hour as needed     #Air hunger/Dyspnea   - 1st choice:Morphine PO/SL 5-10 mg q 1 hour as needed.   - 2nd choice:Morphine IV 1-2 mg q 15 minutes as needed                                             or   - 1st choice: hydromorphone PO/SL 1-2 mg q 1 hour as needed.   - 2nd choice:hydromorphone IV 0.3-0.5 mg q 15 minutes as needed      #Anxiety    - 1st choice: Lorazepam PO/SL q 1 mg  q 3 hour as needed.   - 2nd choice: Lorazepam IV q 1 mg  q 3 hour as needed    #Secretion burden   -Robinul 0.1 mg  q 4 hours as needed. If ineffective, increase up to 0.3 mg   -Consider atropine if Robinul ineffective after dose increase      #Nausea   -Zofran 4 mg q 6 hours as needed. Can increase to 8 mg   -Zyprexa 5 mg q 6 hours as needed     #Dementia   #High risk for delirium     Avoid benzodiazepines, antihistamines,  anticholinergics if able.    Lights on and blinds open during the day.  Reorient frequently.    Lights & TV off during the night.  Promote normal circadian rhythm.    Limit sensory deprivation - utilize hearing aids, glasses, etc.    Frequently assess basic needs such as temperature, elimination, thirst/hunger, pain    Aggressive symptoms control first, then  o 1st choice: Zyprexa 5 mg ODT or IM   o 2nd choice: Increase dose of Zyprexa to 10 mg or consider switch to Haldol   o 3rd choice: Lorazepam as above     #Acute cystitis     Continue with antibiotics while he remains IP after discussion with family.     Tomorrow will be day 7 of antimicrobial coverage, reasonable to complete course.     PSYCHOSOCIAL/SPIRITUAL:    Family     Friends      Palliative Care will continue to follow. Thank you for the consult and allowing us to aid in the care of William Ba.        Jimmy Ledezma NP  Securely message with [employer] (more info)  Text page via [facility] Paging/Directory       Palliative Summary/HPI     William Ba is a 88 year old male with a past medical history of hypertension, BPH, coronary artery disease, A-fib on chronic anticoagulation, AAA, prostate cancer, COPD, dementia who presented from his memory care who presented on 6/8 with aspiration pneumonia and increased agitation..  The palliative team was consulted to explore goals of care.       Palliative Care Summary:   Met with Erin Raymundo (dtr),Liliana Butterfield and JALIL  .   I introduced our role as an extra layer of support and how we help patients and families dealing with serious, potentially life-limiting illnesses. I explained the composition of the palliative care team.  Palliative care helps patients and families navigate their care while focusing on the whole person; providing emotional, social and spiritual support  Palliative care often assists with symptom management, information sharing about what to expect from the illness, available treatment  options and what effect those options may have on the disease course, and provide effective communication and caring support.    Prognosis, Goals, & Planning:      Functional Status just prior to this current hospitalization:    ECOG3 (Capable of only limited self-care; needs help with ADLs; in bed/chair >50% of waking hours)      Prognosis, Goals, and/or Advance Care Planning:    We discussed general treatment options (full/restorative, selective/conservatives, and comfort only/hospice). We then discussed how these specifically apply to William . Based on this discussion, family has elected to transition to comfort care.   Education provided on transition to comfort-focused goals of care would be including discontinuation of IV fluids, cardiac monitoring, labs, tube feeding, TPN and other interventions that do not directly promote comfort.  Anticipatory guidance was given regarding feeding, hunger, fluids at end of life. We discussed utilization of medications to ease air hunger, agitation and restlessness at the time that ventilator is compassionately withdrawn.  Discussed that this process is very purposeful in terms of ensuring patient is as comfortable as possible and that family wishes are honored. Family has elected to continue with antibiotics to complete a full course       Code Status was addressed today:     Yes, We discussed potential risks and rationale of attempting cardiac resuscitation, intubation, and mechanical ventilation.  We also discussed probability of survival as well as quality of life implications.  Based on this discussion, patient or surrogate response/decision: Confirmed DNR/DNI         Patient's decision making preferences: shared with support from loved ones          Patient has decision-making capacity today for complex decisions:Unreliable            Coping, Meaning, & Spirituality:     Mood, coping, and/or meaning in the context of serious illness were addressed today:  Yes    Social:   Living situation:resides in assisted living /memory care with his wife who also suffers from dementia, but they are able to live in the same room   Important relationships/caregivers:DTR Erin lives local and is the primary care given. Son William Hall and his wife live in [place]     Medications:  I have reviewed this patient's medication profile and medications from this hospitalization. Notable medications:    ROS:  Comprehensive ROS is reviewed and is negative except as here & per HPI:     Physical Exam   Vital Signs with Ranges  Temp:  [97.4  F (36.3  C)-98  F (36.7  C)] 98  F (36.7  C)  Pulse:  [] 105  Resp:  [18-22] 20  BP: (113-151)/() 151/100  SpO2:  [94 %-100 %] 98 %  141 lbs 4.8 oz    Physical Exam  Vitals and nursing note reviewed.   Constitutional:       General: He is not in acute distress.  HENT:      Head: Normocephalic.      Mouth/Throat:      Mouth: Mucous membranes are moist.      Pharynx: Oropharynx is clear.   Eyes:      Extraocular Movements: Extraocular movements intact.      Conjunctiva/sclera: Conjunctivae normal.      Pupils: Pupils are equal, round, and reactive to light.   Cardiovascular:      Rate and Rhythm: Normal rate and regular rhythm.      Pulses: Normal pulses.   Pulmonary:      Effort: Pulmonary effort is normal. No respiratory distress.      Breath sounds: No wheezing.   Abdominal:      General: Abdomen is flat. There is no distension.      Tenderness: There is no abdominal tenderness.   Musculoskeletal:         General: Normal range of motion.   Skin:     General: Skin is warm and dry.      Capillary Refill: Capillary refill takes less than 2 seconds.   Neurological:      General: No focal deficit present.      Mental Status: He is alert. Mental status is at baseline.   Psychiatric:         Mood and Affect: Mood normal.         Thought Content: Thought content normal.           Data reviewed:  Results for orders placed or performed during the  hospital encounter of 06/08/23 (from the past 24 hour(s))   CBC with platelets   Result Value Ref Range    WBC Count 8.9 4.0 - 11.0 10e3/uL    RBC Count 4.11 (L) 4.40 - 5.90 10e6/uL    Hemoglobin 12.6 (L) 13.3 - 17.7 g/dL    Hematocrit 39.0 (L) 40.0 - 53.0 %    MCV 95 78 - 100 fL    MCH 30.7 26.5 - 33.0 pg    MCHC 32.3 31.5 - 36.5 g/dL    RDW 13.6 10.0 - 15.0 %    Platelet Count 185 150 - 450 10e3/uL   Basic metabolic panel   Result Value Ref Range    Sodium 142 136 - 145 mmol/L    Potassium 3.5 3.4 - 5.3 mmol/L    Chloride 106 98 - 107 mmol/L    Carbon Dioxide (CO2) 25 22 - 29 mmol/L    Anion Gap 11 7 - 15 mmol/L    Urea Nitrogen 15.1 8.0 - 23.0 mg/dL    Creatinine 0.80 0.67 - 1.17 mg/dL    Calcium 8.5 (L) 8.8 - 10.2 mg/dL    Glucose 106 (H) 70 - 99 mg/dL    GFR Estimate 86 >60 mL/min/1.73m2          Medical Decision Making     85 MINUTES SPENT BY ME on the date of service doing chart review, history, exam, documentation & further activities per the note.

## 2023-06-12 NOTE — CONSULTS
Care Management Follow Up    Length of Stay (days): 2    Expected Discharge Date: 06/14/2023     Concerns to be Addressed:       Patient plan of care discussed at interdisciplinary rounds: Yes    Anticipated Discharge Disposition: Hospice     Anticipated Discharge Services: None  Anticipated Discharge DME: Oxygen    Patient/family educated on Medicare website which has current facility and service quality ratings: yes  Education Provided on the Discharge Plan:    Patient/Family in Agreement with the Plan: yes    Referrals Placed by CM/SW:    Private pay costs discussed: transportation costs  Reviewed out of pocket cost for  [facility] stretcher transport, $1117.00 for base rate and $26.06 per mile to the destination. Pt/family expressed understanding and are agreeable to this.      Patient requires stretcher transportation due to weakness and confusion     Stretcher transportation has been arranged for 6/14 0568-9075. Patient and bedside nurse notified of transportation time.    Ambulance PCS form completed and placed in patient chart. Ambulance PCS form should be given to transportation team.    Additional Information:    Updated that pt/family have decided upon [facility] at [facility].    Spoke with [facility] (P: [phone] F: [phone]) who can do an admission for pt Wednesday 6/14 at 1230. They request 2 days of comfort medications be filled and sent with pt bubble packed.     Spoke with Beti Felipe RN (P: [phone]  F: [phone]) who confirms that they can accept pt back. They are accepting of pt leaving the hospital around noon. Updated on pt mobility,  understanding and agreeable to accept pt back.     Met with pt/family at bedside who would like [facility] Stretcher transport. Discussed potential cost associated in which pt family plans to call his insurance to verify coverage. Family is aware and agreeable to the plan. Plan for discharge to [facility] 6/14 via stretcher at  [phone] with [facility] admission at 1230.     AURORA Willson, [facility]  Inpatient Care Coordination  Emergency Room /Float  [phone]  Nely العلي, SW

## 2023-06-12 NOTE — PLAN OF CARE
"Goal Outcome Evaluation:      Plan of Care Reviewed With: patient    Overall Patient Progress: improvingOverall Patient Progress: improving     Vitals: /78 (BP Location: Left arm, Patient Position: Semi-Curry's, Cuff Size: Adult Regular)   Pulse 86   Temp 97.4  F (36.3  C) (Axillary)   Resp 22   Ht 1.854 m (6' 1\")   Wt 74.4 kg (164 lb 0.4 oz)   SpO2 94%   BMI 21.64 kg/m      Primary Diagnosis: PNA  Pain: Denies  Orientation: Disoriented to time/place/situation  GI/: External catheter intact and draining  LDA: Patient pulled IV out, new IV placed in LFA  Skin: Scattered bruising  Bowel sounds: Active  Respiratory: Coarse crackles  Diet: Low sat fat  Activity: 2A GB/walker  Followed by: PT/OT/SW/palliative  Plan: Palliative consulted, family interested in hospice care for pt. Plan to discharge in 2 days pending improvement, patient will go back to memory care  with wife.        "

## 2023-06-12 NOTE — PROGRESS NOTES
A BiPAP of  12/7 @ 30% was applied to the pt via the mask for an increase in WOB and/or SOB.  The bridge of the nose looks good and remains intact. Pt is tolerating it well. Will continue to monitor and assess the pt's current respiratory status and needs.    Reinaldo Leach, RT on 6/11/2023 at 11:39 PM

## 2023-06-12 NOTE — PLAN OF CARE
Goal Outcome Evaluation:    Plan of Care Reviewed With: patient, family    Overall Patient Progress: declining    Alert to self only. Comfort cares. Prn meds given, see MAR. Confused and trying to get out of bed throughout shift. Pulled off external cath. Still receiving some meds. Daughter would like to discuss POC with palliative in the AM.

## 2023-06-12 NOTE — PLAN OF CARE
"Goal Outcome Evaluation:      Plan of Care Reviewed With: patient, child    Overall Patient Progress: improvingOverall Patient Progress: improving    Blood pressure 129/82, pulse 96, temperature 97.7  F (36.5  C), temperature source Oral, resp. rate 20, height 1.854 m (6' 1\"), weight 74.4 kg (164 lb 0.4 oz), SpO2 94 %.     VSS.  Pt Alert to self and eating meals with assistance.  Pt confused and forgetful at times. Pt on O2 at 1 Lpm via Nasal cannula. Pt denies CP and SOB.  PIV SL.  External catheter changed and in place. Pt Assist x 2 with GB to pivot.  Afib CVR on tele.  ABX provided.  Continue with POC.       "

## 2023-06-12 NOTE — PLAN OF CARE
"  Vitals:  /79   Pulse 96   Temp 97.7  F (36.5  C) (Oral)   Resp 20   Ht 1.854 m (6' 1\")   Wt 74.4 kg (164 lb 0.4 oz)   SpO2 94%   BMI 21.64 kg/m      Resp: LS diminish  GI: BS audible, passing flatus  : Voiding well using external catheter  Skin: bruises on the UE  Activity: A x 2 with a walker and GB  Diet: Regular  Pain: Denies  LDA: PIV SL and patent  Cardiac/Tele: SR 90s  Safety check completed: Yes  Blood glucose: N/A  Patient condition: Stable  Protocols: N/A   Plan: To discharge back to memory care    Pt on Abx, has BIPAP on, RT stated that if he takes it off, just let him be and keep monitoring O2. Pt tries to get OOB bed, alarm on, nursing staff will keep monitoring.  "

## 2023-06-12 NOTE — PROGRESS NOTES
Palliative Care Initial Social Work Note  Location: [facility]    Patient Info:  William Ba is a 88 year old male with increased agitation in setting of new diagnosis of aspiration pneumonia.  Patient's past medical history significant for coronary artery disease, atrial fibrillation, prostate cancer, COPD, dementia, hyperlipidemia. Lives at [facility] with spouse Myriam.    Brief summary of visit: Joint visit with Palliative care NP Jimmy Ledezma. Met with daughter Erin, son Zackary/daughter in law.     Reviewed questions and goals of care.    Family reviewed what is most important is for William to be with his wife with hospice care.     Care management to work on return to [facility] with [facility].       Date of Admission: 6/8/2023    Reason for consult: Patient and family support    Sources of information: Family member see above    Recommendations & Plan:  Palliative care remains involved during hospitalization for support.      Symptoms & Concerns Addressed Today:  Emotional -Offered suppotive presence and emotional support with end of life discussion  End of life -Open and accepting of hospice services.    Strengths Identified:  Family is engaged and supportive.    Relationships & Support:  Aspects of relationships and support assessed today:    Identified family members: see above    Professional supports: [facility]    Family coping: [place]    Bereavement Risk concerns: Spouse also in [facility].    Coping, Mental Health & Adjustment to Illness:   Other no concerns    Goals, Decision Making & Advance Care Planning:   Prognosis, Goals, and/or Advance Care Planning were assessed today: Yes  If yes, brief summary of discussion: see above  Preferred language: English   Patient's decision making preferences: shared with support from loved ones  I have concerns about the patient/family's health literacy today: No  Patient has a completed Health Care Directive: Yes, and on  file.  Code status per chart review: No CPR / No Intubation      Clinical Social Work Interventions:   Introduction of Palliative clinical social work interventions   Adjustment to illness counseling  Family communication facilitated  Grief counseling    Peggy Paulson [facility]  [facility], Palliative Care  Securely message with the Vocera Web Console (learn more here)  Or please use Palliative Team Pager

## 2023-06-12 NOTE — PROGRESS NOTES
[facility]    Hospitalist Progress Note  Name: William Ba    MRN: [id number]  Provider: Little William MD  Date of Service: 06/12/2023    Assessment & Plan   Summary of Stay: William Ba is a 88 year old male who was admitted on 6/8/2023 for increased agitation in setting of new diagnosis of aspiration pneumonia.  Patient's past medical history significant for coronary artery disease, atrial fibrillation, prostate cancer, COPD, dementia, hyperlipidemia lives in a memory care group home.  He was recently discharged from emergency room for aspiration pneumonia on Augmentin.  But returned with increased agitation.    6/10/2023.  Patient is increased work of breathing improved after IV diuresis and overnight required BiPAP which helped his breathing.  Currently on 2 L satting well and breathing comfortably    6/12/23: Wean supplemental O2        Recent agitation in setting of known dementia  Acute infectious encephalopathy  -In the emergency room patient appears to be, started on Seroquel 12.5 mg every 6 hours as needed  -Supportive care  -Redirection  -UA with evidence of hematuria but no UTI      Acute Resp failure with hypoxia  Recent diagnosis of aspiration pneumonia  -A day prior to most recent hospitalization patient was admitted for acute respiratory failure.  Oxygen needs were down to baseline was discharged home on room air oxygenation  -Speech therapist was consulted recommended regular diet with thin liquids but reduce distraction while eating  -Patient received a dose of ceftriaxone and azithromycin in emergency room.  Was switched to Unasyn given aspiration pneumonia  -Continue on nebs and Mucinex  - requiring increased o2, tachypneic  - on exam has crackles   - iv lasixs 40mg X1 6/10/2023  - Iv ativan for anxiety  - discontinue fluids  - daily weight  - d/w pt's daughter who is a nurse  -Patient responded well to IV Lasix  -Echocardiogram 6/10/2023 within normal limits.  EF of 55%  "some \"basal inferior hypokinesia\"  Atrial fibrillation and flutter  -Continue metoprolol  -Continue Xarelto  -Continue wean O2    Coronary artery disease  -On metoprolol, atorvastatin  -Shows basal inferior hypokinesia    Known history of dementia  -Lives in memory care  -Continue Aricept  -Family wanted to know more about palliative care and hospice care.  -Consult palliative to discuss regarding hospice care    Acquired coagulopathy due to Xarelto  -d/w patients daughter, may discontinue xarelto for bleeding risk      DVT Prophylaxis: Pneumatic Compression Devices  Code Status: No CPR- Do NOT Intubate    Disposition: Expected discharge at least 2 days pending improvement      Interval History   Reviewed chart.  has underlying dementia at baseline cannot complete 10 point ROS.    -Data reviewed today: I reviewed all new labs and imaging reports over the last 24 hours. I personally reviewed no images or EKG's today.    Physical Exam   Temp: 97.4  F (36.3  C) Temp src: Axillary BP: 113/78 Pulse: 74   Resp: 18 SpO2: 99 % O2 Device: Nasal cannula Oxygen Delivery: 1 LPM  Vitals:    06/10/23 0611 06/11/23 0608 06/12/23 0220   Weight: 72.2 kg (159 lb 2.8 oz) 74.4 kg (164 lb 0.4 oz) 64.1 kg (141 lb 4.8 oz)     Vital Signs with Ranges  Temp:  [97.4  F (36.3  C)-97.7  F (36.5  C)] 97.4  F (36.3  C)  Pulse:  [74-97] 74  Resp:  [18-22] 18  BP: (113-129)/(78-82) 113/78  SpO2:  [94 %-100 %] 99 %  I/O last 3 completed shifts:  In: 580 [P.O.:580]  Out: 850 [Urine:850]      GEN: Sitting breathing comfortably.  Cannot assess for orientation   HEENT:  Normocephalic/atraumatic, no scleral icterus, no nasal discharge, mouth dry.  CV :tachycardic no murmur or JVD.  S1 + S2 noted, no S3 or S4.  LUNGS:  Bibasilar crackles,  Symmetric chest rise on inhalation noted.  ABD:  Active bowel sounds, soft, non-tender/non-distended.  No rebound/guarding/rigidity.  EXT:  No edema.  No cyanosis.    SKIN:  Dry to touch, no exanthems noted in the " visualized areas.    Medications    - MEDICATION INSTRUCTIONS -      - MEDICATION INSTRUCTIONS -        ampicillin-sulbactam  3 g Intravenous Q6H    atorvastatin  40 mg Oral At Bedtime    diclofenac  2 g Topical BID    donepezil  5 mg Oral QPM    famotidine  10 mg Oral QAM    furosemide  20 mg Oral Daily    guaiFENesin  200 mg Oral Q4H    ipratropium - albuterol 0.5 mg/2.5 mg/3 mL  3 mL Nebulization 4x daily    metoprolol tartrate  25 mg Oral BID    rivaroxaban ANTICOAGULANT  20 mg Oral Daily with supper    senna-docusate  1 tablet Oral BID    Or    senna-docusate  2 tablet Oral BID    sodium chloride (PF)  3 mL Intracatheter Q8H     Data     Recent Labs   Lab 06/12/23  0716 06/11/23  0756 06/10/23  0740   WBC 8.9 9.2 8.2   HGB 12.6* 13.1* 11.9*   HCT 39.0* 39.9* 37.1*   MCV 95 95 95    186 173     Recent Labs   Lab 06/12/23  0716 06/11/23  0756 06/10/23  0740    144 142   POTASSIUM 3.5 3.7 3.8   CHLORIDE 106 106 107   CO2 25 25 26   ANIONGAP 11 13 9   * 90 90   BUN 15.1 17.3 19.5   CR 0.80 0.76 0.86   GFRESTIMATED 86 87 84   NIEVES 8.5* 8.7* 8.6*     7-Day Micro Results       Collected Updated Procedure Result Status      06/08/2023 2011 06/11/2023 2346 Blood Culture Peripheral Blood [[id number]]   Peripheral Blood    Preliminary result Component Value   Culture No growth after 3 days  [P]                06/08/2023 2011 06/11/2023 2346 Blood Culture Line, venous [[id number]]   Blood from Line, venous    Preliminary result Component Value   Culture No growth after 3 days  [P]                06/07/2023 1858 06/07/2023 1945 Symptomatic Influenza A/B, RSV, & SARS-CoV2 PCR (COVID-19) Nasopharyngeal [[id number]]    Swab from Nasopharyngeal    Final result Component Value   Influenza A PCR Negative   Influenza B PCR Negative   RSV PCR Negative   SARS CoV2 PCR Negative   NEGATIVE: SARS-CoV-2 (COVID-19) RNA not detected, presumed negative.                  Recent Labs   Lab 06/07/23  1847   AST 32    ALT 29   ALKPHOS 83   BILITOTAL 1.0     No results for input(s): INR in the last 168 hours.  Recent Labs   Lab 06/10/23  0740 06/08/23 2011 06/08/23  1933   LACT 1.0 1.9 3.5*     No results for input(s): LIPASE in the last 168 hours.  No results for input(s): TROPONIN, TROPI, TROPR, TROPONINIS in the last 168 hours.    Invalid input(s): TROPT, TROP, TROPONINIES, TNIH  Recent Labs   Lab 06/09/23  1150   COLOR Orange*   APPEARANCE Slightly Cloudy*   URINEGLC Negative   URINEBILI Negative   URINEKETONE Negative   SG 1.030   UBLD Large*   URINEPH 5.5   PROTEIN 50*   NITRITE Negative   LEUKEST Negative   RBCU >182*   WBCU 1       No results found for this or any previous visit (from the past 24 hour(s)).

## 2023-06-13 NOTE — PLAN OF CARE
Goal Outcome Evaluation:    Plan of Care Reviewed With: patient, family    Overall Patient Progress: declining    Comfort cares. Resting comfortably in bed. Prn meds given. External catheter in place. Suction set up. Family at bedside most of shift. Atropine ordered by provider. Plan to discharge back to memory care facility for hospice.

## 2023-06-13 NOTE — PROGRESS NOTES
"SPIRITUAL HEALTH SERVICES Progress Note (Palliative focus)  [facility] 3MedSurg    Referral Source: Palliative team     Per family request, facilitated connection with  for anointing of the sick. Sebastián Cornelius plans to provide sacrament of the sick tomorrow (Wednesday, June 14) morning around 10am.    Update, as of 12:30pm: Responded to text page - family request for  support.   Provided emotional support to Zackary's children at bedside. Zackary's daughter, Erin, reflected on Zackary's Yazidism eliecer and his desire for anointing as well as his appreciation for the support of the [place]  at his current assisted living. Communicated 's plan for anointing to family. Erin requested prayer for \"peace\" for Zackary and for their mom, Myriam. I provided a prayer at the bedside with family and concluded with an Our Father, which is an important prayer to Zackary.    Plan: Fr. Cornelius plans to be here tomorrow morning for anointing prior to discharge. [facility] remains available.    Radha Steinberg MDiv  Chaplain Resident  [facility] Pager: [phone]    [facility] available 24/7 for emergent requests/referrals, either by having the on-call  paged or by entering an ASAP/STAT consult in Epic (this will also page the on-call ).    "

## 2023-06-13 NOTE — PLAN OF CARE
Nursing Summary:    [id number]    Patient is lethargic/sleeping this shift. Comfort care maimtained; repositioning completed per protocol. External catheter in place.    Plan: Discharge to hospice on Wednesday.

## 2023-06-13 NOTE — PROGRESS NOTES
PALLIATIVE CARE PROGRESS NOTE  [facility]     Patient Name: William Ba  Date of Admission: 6/8/2023   Today the patient was seen for: GOC follow-up and symptom assessment      Recommendations & Counseling     GOALS OF CARE:     Transition to Comfort focused    comfort care while inpatient; continue with antibiotics after discussion with family.     Discharge back to [facility] with [facility]     ADVANCE CARE PLANNING:    No health care directive on file. Per  informed consent policy next of kin should be involved if patient becomes unable.    There is a POLST form which is reviewed and current.    Code status: No CPR- Do NOT Intubate     MEDICAL MANAGEMENT:   Comfort Care     #Dementia   #High risk for delirium     Avoid benzodiazepines, antihistamines, anticholinergics if able.    Lights on and blinds open during the day.  Reorient frequently.    Lights & TV off during the night.  Promote normal circadian rhythm.    Limit sensory deprivation - utilize hearing aids, glasses, etc.    Frequently assess basic needs such as temperature, elimination, thirst/hunger, pain    Aggressive symptoms control first, then  ? Seroquel 12.5 mg BID and 25 mg at HS  ? PRN Haldol, 2 mg q 3 hours as needed       #Pain  - 1st choice:Morphine PO/SL 5-10 mg q 1 hour as needed.   - 2nd choice:Morphine IV 1-2 mg q 15 minutes as needed   - Adjunct: Tylenol MD                                            or   - 1st choice: hydromorphone PO 1-2 mg q 1 hour as needed.   - 2nd choice:hydromorphone IV 0.3-0.5 mg q 15 minutes as needed      If unable to take PO and morphine, consider oxycodone SL 5-10 q 1 hour as needed      #Air hunger/Dyspnea   - 1st choice:Morphine PO/SL 5-10 mg q 1 hour as needed.   - 2nd choice:Morphine IV 1-2 mg q 15 minutes as needed                                             or   - 1st choice: hydromorphone PO/SL 1-2 mg q 1 hour as needed.   - 2nd choice:hydromorphone IV 0.3-0.5 mg q 15  minutes as needed      #Anxiety    - 1st choice: Lorazepam PO/SL q 1-2 mg  q 3 hour as needed.   - 2nd choice: Lorazepam IV q 1 mg-2  q 3 hour as needed     #Secretion burden   -Robinul 0.1 mg  q 4 hours as needed. If ineffective, increase up to 0.3 mg   -Consider atropine if Robinul ineffective after dose increase      #Nausea   -Zofran 4 mg q 6 hours as needed. Can increase to 8 mg   -Zyprexa 5 mg q 6 hours as needed      #Acute cystitis     Continue with antibiotics while he remains IP after discussion with family.     Tomorrow will be day 7 of antimicrobial coverage, reasonable to complete course.      PSYCHOSOCIAL/SPIRITUAL:    Family     Friends    Jimmy Ledezma NP  Securely message with Vocera (more info)  Text page via [facility] Paging/Directory           Assessments           William Ba is a 88 year old male with a past medical history of hypertension, BPH, coronary artery disease, A-fib on chronic anticoagulation, AAA, prostate cancer, COPD, dementia who presented from his memory care who presented on 6/8 with aspiration pneumonia and increased agitation..  The palliative team was consulted to explore goals of care.     Prognosis, Goals, or Advance Care Planning was addressed today with: Yes.  Mood, coping, and/or meaning in the context of serious illness were addressed today: Yes.              Interval History:     Chart review/discussion with unit or clinical team members:   Course reviewed. Restless and agitated last evening. Dtr expressed concerns regarding symptom plan. Will follow up with family today. On assessment, he is resting comfortably in bed.                   Review of Systems:     BLAINE                Physical Exam:   Temp: 98.2  F (36.8  C) Temp src: Oral Temp  Min: 98.2  F (36.8  C)  Max: 98.2  F (36.8  C) BP: (!) 137/90 Pulse: 84   Resp: 20 SpO2: 92 % O2 Device: None (Room air)    Vital Signs with Ranges  Temp:  [98.2  F (36.8  C)] 98.2  F (36.8  C)  Pulse:  [84-95] 84  Resp:   [18-20] 20  BP: (137)/(90) 137/90  SpO2:  [92 %-96 %] 92 %  141 lbs 4.8 oz    Physical Exam  Vitals and nursing note reviewed.   Constitutional:       General: He is sleeping. He is not in acute distress.  HENT:      Head: Normocephalic.      Mouth/Throat:      Mouth: Mucous membranes are moist.      Pharynx: Oropharynx is clear.   Eyes:      Conjunctiva/sclera: Conjunctivae normal.      Pupils: Pupils are equal, round, and reactive to light.   Cardiovascular:      Rate and Rhythm: Normal rate and regular rhythm.      Pulses: Normal pulses.   Pulmonary:      Effort: Pulmonary effort is normal. No respiratory distress.      Breath sounds: No wheezing.   Abdominal:      General: Abdomen is flat. There is no distension.      Tenderness: There is no abdominal tenderness.   Musculoskeletal:         General: Normal range of motion.   Skin:     General: Skin is warm and dry.      Capillary Refill: Capillary refill takes less than 2 seconds.   Neurological:      General: No focal deficit present.      Mental Status: He is disoriented.                  Current Problem List:   Principal Problem:    Unsteady gait  Active Problems:    Pneumonia due to infectious organism, unspecified laterality, unspecified part of lung      Allergies   Allergen Reactions     Bee Swelling     Influenza Virus Vaccine      Methylprednisolone Other (See Comments)     IV steroids causes confusion. PO steroids he tolerates with no issues            Data Reviewed:       Data   No results found for this or any previous visit (from the past 24 hour(s)).*      -----------------------------------------------------------------------------------------------------------------          ====================================================  TT: I have personally spent a total of 46 minutes on the date of the encounter,  on the unit in review of medical record, consultation with the medical providers and assessment of William Ba  today, with more than 50%  of this time spent in counseling, coordination of care, and discussion re: diagnostic results, prognosis, symptom management, risks and benefits of management options, and development of plan of care as noted above.      ====================================================

## 2023-06-13 NOTE — PROGRESS NOTES
[facility]    Hospitalist Progress Note  Name: William Ba    MRN: [id number]  Provider: Little William MD  Date of Service: 06/13/2023    Assessment & Plan   Summary of Stay: William Ba is a 88 year old male who was admitted on 6/8/2023 for increased agitation in setting of new diagnosis of aspiration pneumonia.  Patient's past medical history significant for coronary artery disease, atrial fibrillation, prostate cancer, COPD, dementia, hyperlipidemia lives in a memory care group home.  He was recently discharged from emergency room for aspiration pneumonia on Augmentin.  But returned with increased agitation.    During hospitalization was treated with Unasyn for aspiration pneumonia.  Also started on Seroquel for agitation.  Palliative team was consulted for goals of care.  Patient clinically has improved not requiring submental O2 at this time.  He is not agitated while on Seroquel.  He however is confused seems to be worsening dementia.  Plan is to discharge patient to his memory care tomorrow to be with his wife.  Hospice nurse will meet with the family at the facility plans to register him to hospice care.     Complete discharge orders.  Discussed with patient's daughter and son.         Recent agitation in setting of known dementia  Acute infectious encephalopathy  -In the emergency room patient appears to be, started on Seroquel 12.5 mg every 6 hours as needed  -Supportive care  -Redirection  -UA with evidence of hematuria but no UTI      Acute Resp failure with hypoxia: resolved  Recent diagnosis of aspiration pneumonia  -A day prior to most recent hospitalization patient was admitted for acute respiratory failure.  Oxygen needs were down to baseline was discharged home on room air oxygenation  -Speech therapist was consulted recommended regular diet with thin liquids but reduce distraction while eating  -Patient received a dose of ceftriaxone and azithromycin in emergency room.  Was  "switched to Unasyn given aspiration pneumonia  -Continue on nebs and Mucinex  -No longer required submental O2.  Is not tachypneic  -Lung exam improved  -On admission he did improve with IV diuresis  -Echocardiogram 6/10/2023 within normal limits.  EF of 55% some \"basal inferior hypokinesia\"  Atrial fibrillation and flutter.  - Discussed these findings with patient's daughter.  Plan is to not consider any aggressive interventions.  We will continue metoprolol.  Discussed continuation of Xarelto but given patient's falls risk was discontinued after discussion with palliative care      Coronary artery disease  -On metoprolol, atorvastatin  -Shows basal inferior hypokinesia    Known history of dementia  -Lives in memory care  -Continue Aricept  -Family wanted to know more about palliative care and hospice care.  -Consulted palliative to discuss regarding hospice care      DVT Prophylaxis: Pneumatic Compression Devices  Code Status: No CPR- Do NOT Intubate    Disposition: Expected discharge tomorrow to [facility] with plans for hospice      Interval History   Reviewed chart.  has underlying dementia at baseline cannot complete 10 point ROS.  Detailed conversation patient's daughter, son and care coordinator  Discharge orders completed    Total Time spent on direct  patient care and coordinaton of patient care was more than 60 min    -Data reviewed today: I reviewed all new labs and imaging reports over the last 24 hours. I personally reviewed no images or EKG's today.    Physical Exam   Temp: 98.2  F (36.8  C) Temp src: Oral BP: (!) 137/90 Pulse: 84   Resp: 20 SpO2: 92 % O2 Device: None (Room air)    Vitals:    06/10/23 0611 06/11/23 0608 06/12/23 0220   Weight: 72.2 kg (159 lb 2.8 oz) 74.4 kg (164 lb 0.4 oz) 64.1 kg (141 lb 4.8 oz)     Vital Signs with Ranges  Temp:  [98.2  F (36.8  C)] 98.2  F (36.8  C)  Pulse:  [84] 84  Resp:  [20] 20  BP: (137)/(90) 137/90  SpO2:  [92 %] 92 %  I/O last 3 completed shifts:  In: 3 " [I.V.:3]  Out: -       GEN:  breathing comfortably.  Cannot assess for orientation   HEENT:  Normocephalic/atraumatic, no scleral icterus, no nasal discharge, mouth dry.  CV : Irregularly irregular, rate normal, no murmur or JVD.  S1 + S2 noted, no S3 or S4.  LUNGS:  Bibasilar crackles,  Symmetric chest rise on inhalation noted.  ABD:  Active bowel sounds, soft, non-tender/non-distended.  No rebound/guarding/rigidity.  EXT:  No edema.  No cyanosis.    SKIN:  Dry to touch, no exanthems noted in the visualized areas.    Medications     - MEDICATION INSTRUCTIONS -         ampicillin-sulbactam  3 g Intravenous Q6H     diclofenac  2 g Topical BID     donepezil  5 mg Oral QPM     famotidine  10 mg Oral QAM     guaiFENesin  200 mg Oral Q4H     QUEtiapine  12.5 mg Oral BID     QUEtiapine  25 mg Oral At Bedtime     senna-docusate  1 tablet Oral BID    Or     senna-docusate  2 tablet Oral BID     sodium chloride (PF)  3 mL Intracatheter Q8H     Data     Recent Labs   Lab 06/12/23  0716 06/11/23  0756 06/10/23  0740   WBC 8.9 9.2 8.2   HGB 12.6* 13.1* 11.9*   HCT 39.0* 39.9* 37.1*   MCV 95 95 95    186 173     Recent Labs   Lab 06/12/23  0716 06/11/23  0756 06/10/23  0740    144 142   POTASSIUM 3.5 3.7 3.8   CHLORIDE 106 106 107   CO2 25 25 26   ANIONGAP 11 13 9   * 90 90   BUN 15.1 17.3 19.5   CR 0.80 0.76 0.86   GFRESTIMATED 86 87 84   NIEVES 8.5* 8.7* 8.6*     7-Day Micro Results     Collected Updated Procedure Result Status      06/08/2023 2011 06/12/2023 2346 Blood Culture Peripheral Blood [[id number]]   Peripheral Blood    Preliminary result Component Value   Culture No growth after 4 days  [P]                06/08/2023 2011 06/12/2023 2346 Blood Culture Line, venous [[id number]]   Blood from Line, venous    Preliminary result Component Value   Culture No growth after 4 days  [P]                06/07/2023 1858 06/07/2023 1945 Symptomatic Influenza A/B, RSV, & SARS-CoV2 PCR (COVID-19) Nasopharyngeal  [[id number]]    Swab from Nasopharyngeal    Final result Component Value   Influenza A PCR Negative   Influenza B PCR Negative   RSV PCR Negative   SARS CoV2 PCR Negative   NEGATIVE: SARS-CoV-2 (COVID-19) RNA not detected, presumed negative.                Recent Labs   Lab 06/07/23  1847   AST 32   ALT 29   ALKPHOS 83   BILITOTAL 1.0     No results for input(s): INR in the last 168 hours.  Recent Labs   Lab 06/10/23  0740 06/08/23 2011 06/08/23  1933   LACT 1.0 1.9 3.5*     No results for input(s): LIPASE in the last 168 hours.  No results for input(s): TROPONIN, TROPI, TROPR, TROPONINIS in the last 168 hours.    Invalid input(s): TROPT, TROP, TROPONINIES, TNIH  Recent Labs   Lab 06/09/23  1150   COLOR Orange*   APPEARANCE Slightly Cloudy*   URINEGLC Negative   URINEBILI Negative   URINEKETONE Negative   SG 1.030   UBLD Large*   URINEPH 5.5   PROTEIN 50*   NITRITE Negative   LEUKEST Negative   RBCU >182*   WBCU 1       No results found for this or any previous visit (from the past 24 hour(s)).

## 2023-06-14 NOTE — PLAN OF CARE
Physical Therapy: Orders received. Chart reviewed and discussed with care team.? Physical Therapy not indicated following discussion with nursing staff. Patient transitioned to comfort cares and will be discharging from hospital today.? Defer discharge recommendations to medical team.? Will complete orders.

## 2023-06-14 NOTE — PROGRESS NOTES
Care Management Discharge Note    Discharge Date: 06/14/2023       Discharge Disposition: Hospice    Discharge Services: None    Discharge DME: Oxygen    Discharge Transportation:      Private pay costs discussed: transportation costs    PAS Confirmation Code:  NA  Patient/family educated on Medicare website which has current facility and service quality ratings: yes    Education Provided on the Discharge Plan:  yes  Persons Notified of Discharge Plans: pt, family, hospice, and facility  Patient/Family in Agreement with the Plan: yes    Handoff Referral Completed: No    Additional Information:  SW following for discharge planning. SW spoke with [facility] and was informed that they are running late. New  time for pt will be 12:30-1:00pm. SW spoke with [facility] and they have moved their admission time back to 2:00pm. SW informed Beti Felipe and bedside nurse to update family/pt on change of plans.    Social work will continue to follow and assist with discharge planning as needed.    AURORA Godinez, [facility]  Inpatient Care Coordination  [facility]  [phone]          AURORA Godinez

## 2023-06-14 NOTE — PLAN OF CARE
Goal Outcome Evaluation:    Disoriented x 4. On comfort cares; restless at times. PRN morphine given for nonverbal pain indicators. PRN haldol given. Family was at bedside this evening. Will continue plan of care.

## 2023-06-14 NOTE — PLAN OF CARE
[id number]: Pt on comfort cares. Disoriented x4. Restless. Incontinent bowel and bladder; external catheter in place. Voiding w good UOP. L PIV intact. PAINAD 2. Prn haldol given 1x. Scheduled ampicillin completed. Safety checks completed and call light within reach.

## 2023-06-14 NOTE — PLAN OF CARE
Disoriented x4. Belongings with family. IV removed. Packet sent with transport. [facility] transport transporting patient. Family to meet at destination.

## 2023-06-14 NOTE — DISCHARGE SUMMARY
[facility]    Discharge Summary  Hospitalist    Date of Admission:  6/8/2023  Date of Discharge:  6/14/2023  Provider:  Luis Antonio Gregory MD  Date of Service (when I last saw the patient): 06/14/23    Discharge Diagnoses   Acute infectious encephalopathy  Acute Resp failure with hypoxia   Recent diagnosis of aspiration pneumonia  Recent agitation in the setting of known dementia  Atrial fibrillation and flutter.  Coronary artery disease  Known history of dementia    Other medical issues:  Past Medical History:   Diagnosis Date     Abdominal aortic aneurysm without rupture (H) 02/25/2016     Atrial fibrillation/flutter      Atrial fibrillation/flutter 11/13/2019     COPD (chronic obstructive pulmonary disease)      Coronary atherosclerosis 01/29/2015     Mixed hyperlipidemia 11/08/2010       History of Present Illness   William Ba is an 88 year old male who presented with increased agitation.  Please see the admission history and physical for full details.    Hospital Course   Summary of Stay: William Ba is a 88 year old male who was admitted on 6/8/2023 for increased agitation in setting of new diagnosis of aspiration pneumonia.  Patient's past medical history significant for coronary artery disease, atrial fibrillation, prostate cancer, COPD, dementia, hyperlipidemia lives in a memory care group home.  He was recently discharged from emergency room for aspiration pneumonia on Augmentin. He returned to ED with increased agitation.     During hospitalization was treated with Unasyn for aspiration pneumonia.  Also started on Seroquel for agitation.  Palliative team was consulted for goals of care.  Patient clinically has improved not requiring submental O2 at this time.  He is not agitated while on Seroquel.  He however is confused seems to be worsening dementia.  Plan is to discharge patient to his memory care tomorrow to be with his wife.  Hospice nurse will meet with the family at the  "facility plans to register him to hospice care.      Recent agitation in setting of known dementia  Acute infectious encephalopathy  -In the emergency room patient appears to be, started on Seroquel 12.5 mg every 6 hours as needed  -Supportive care  -Redirection  -UA with evidence of hematuria but no UTI        Acute Resp failure with hypoxia: resolved  Recent diagnosis of aspiration pneumonia  -A day prior to most recent hospitalization patient was admitted for acute respiratory failure.  Oxygen needs were down to baseline was discharged home on room air oxygenation  -Speech therapist was consulted recommended regular diet with thin liquids but reduce distraction while eating  -Patient received a dose of ceftriaxone and azithromycin in emergency room.  Was switched to Unasyn given aspiration pneumonia  -Continue on nebs and Mucinex  -No longer required submental O2.  Is not tachypneic  -Lung exam improved  -On admission he did improve with IV diuresis  -Echocardiogram 6/10/2023 within normal limits.  EF of 55% some \"basal inferior hypokinesia\"  Atrial fibrillation and flutter.  - Discussed these findings with patient's daughter.  Plan is to not consider any aggressive interventions.  We will continue metoprolol.  Discussed continuation of Xarelto but given patient's falls risk was discontinued after discussion with palliative care        Coronary artery disease  -On metoprolol, atorvastatin  -Shows basal inferior hypokinesia     Known history of dementia  -Lives in memory care  -Continue Aricept  -Family wanted to know more about palliative care and hospice care.  -Consulted palliative to discuss regarding hospice care    # Discharge Pain Plan:    - Patient currently is not being prescribed pain medications by me on discharge.      Significant Results and Procedures   See below     Pending Results      Unresulted Labs Ordered in the Past 30 Days of this Admission     No orders found from 5/9/2023 to 6/9/2023.    "     Code Status   DNR / DNI       Primary Care Physician   Abraham Velez    GEN:  Alert, cooperates, appears comfortable, NAD.  HEENT:  Normocephalic/atraumatic, no scleral icterus, no nasal discharge, mouth moist.  CV:  Regular rate and rhythm, no murmur or JVD.  S1 + S2 noted, no S3 or S4.  LUNGS:  Clear to auscultation bilaterally without rales/rhonchi/wheezing/retractions.  Symmetric chest rise on inhalation noted.  ABD:  Active bowel sounds, soft, non-tender/non-distended.  No rebound/guarding/rigidity.  EXT:  No edema or cyanosis.  No joint synovitis noted.  SKIN:  Dry to touch, no exanthems noted in the visualized areas.     Discharge Disposition   Discharged to [facility] to start hospice care.      Consultations This Hospital Stay   CARE MANAGEMENT / SOCIAL WORK IP CONSULT  PALLIATIVE CARE ADULT IP CONSULT  PHYSICAL THERAPY ADULT IP CONSULT  OCCUPATIONAL THERAPY ADULT IP CONSULT  PHYSICAL THERAPY ADULT IP CONSULT    Time Spent on this Encounter   I, Luis Antonio Gregory MD, personally saw the patient today and spent greater than 30 minutes discharging this patient.     Discharge Orders      Reason for your hospital stay    Please refer discharge summary.  Briefly admitted for aspiration ammonia mental status changes.  Patient is a discharge to memory care to be with his wife.  There is plan to register him to hospice at the facility.     Follow-up and recommended labs and tests     Follow-up with hospice at the Facility     Activity    Your activity upon discharge: activity as tolerated     Diet    Follow this diet upon discharge: Orders Placed This Encounter      Regular Diet Adult     Discharge Medications   Current Discharge Medication List      START taking these medications    Details   !! QUEtiapine (SEROQUEL) 25 MG tablet Take 0.5 tablets (12.5 mg) by mouth 2 times daily    Associated Diagnoses: Delirium      !! QUEtiapine (SEROQUEL) 25 MG tablet Take 1 tablet (25 mg) by mouth At Bedtime    Associated  Diagnoses: Delirium       !! - Potential duplicate medications found. Please discuss with provider.      CONTINUE these medications which have NOT CHANGED    Details   acetaminophen (TYLENOL) 500 MG tablet Take 1,000 mg by mouth every 8 hours as needed for mild pain      albuterol (PROAIR HFA/PROVENTIL HFA/VENTOLIN HFA) 108 (90 Base) MCG/ACT inhaler Inhale 2 puffs into the lungs every 4 hours as needed for shortness of breath / dyspnea or wheezing      diclofenac (VOLTAREN) 1 % topical gel Apply 2 g topically 2 times daily      donepezil (ARICEPT) 5 MG tablet Take 5 mg by mouth every evening      famotidine (PEPCID) 10 MG tablet Take 10 mg by mouth every morning      fluticasone-vilanterol (BREO ELLIPTA) 100-25 MCG/INH inhaler Inhale 1 puff into the lungs daily  Qty: 1 Inhaler, Refills: 11    Associated Diagnoses: Pulmonary emphysema, unspecified emphysema type (H)      guaiFENesin (MUCINEX) 600 MG 12 hr tablet Take 600 mg by mouth 2 times daily as needed for congestion      ipratropium-albuterol (COMBIVENT RESPIMAT)  MCG/ACT inhaler Inhale 1 puff into the lungs 2 times daily      melatonin 3 MG tablet Take 3 mg by mouth every evening      metoprolol tartrate (LOPRESSOR) 25 MG tablet Take 1 tablet (25 mg) by mouth 2 times daily for 30 days  Qty: 60 tablet, Refills: 0    Associated Diagnoses: Atrial fibrillation, unspecified type (H)      miconazole with skin protectant (NETTE ANTIFUNGAL) 2 % CREA cream Apply topically 2 times daily And TID prn      Misc Natural Products (OSTEO BI-FLEX ADV TRIPLE ST) TABS Take 1 capsule by mouth 2 times daily       Multiple Vitamins-Minerals (MULTIVITAMIN ADULT PO) Take one(1) tablet daily.      EPINEPHrine (ANY BX GENERIC EQUIV) 0.3 MG/0.3ML injection 2-pack Inject 0.3 mLs (0.3 mg) into the muscle as needed for anaphylaxis  Qty: 1 each, Refills: 3    Comments: Replace previous script sent  Associated Diagnoses: Allergic reaction, subsequent encounter         STOP taking these  medications       amoxicillin-clavulanate (AUGMENTIN) 875-125 MG tablet Comments:   Reason for Stopping:         atorvastatin (LIPITOR) 40 MG tablet Comments:   Reason for Stopping:         rivaroxaban ANTICOAGULANT (XARELTO) 20 MG TABS tablet Comments:   Reason for Stopping:             Allergies   Allergies   Allergen Reactions     Bee Swelling     Influenza Virus Vaccine      Methylprednisolone Other (See Comments)     IV steroids causes confusion. PO steroids he tolerates with no issues     Data   Most Recent 3 CBC's:Recent Labs   Lab Test 06/12/23  0716 06/11/23  0756 06/10/23  0740   WBC 8.9 9.2 8.2   HGB 12.6* 13.1* 11.9*   MCV 95 95 95    186 173      Most Recent 3 BMP's:  Recent Labs   Lab Test 06/12/23  0716 06/11/23  0756 06/10/23  0740    144 142   POTASSIUM 3.5 3.7 3.8   CHLORIDE 106 106 107   CO2 25 25 26   BUN 15.1 17.3 19.5   CR 0.80 0.76 0.86   ANIONGAP 11 13 9   NIEVES 8.5* 8.7* 8.6*   * 90 90     Most Recent 2 LFT's:  Recent Labs   Lab Test 06/07/23  1847 06/14/21  1005   AST 32 18   ALT 29 31   ALKPHOS 83 82   BILITOTAL 1.0 1.0     Most Recent INR's and Anticoagulation Dosing History:  Anticoagulation Dose History        Row Labels Latest Ref Rng & Units 6/10/2020   Recent Dosing and Labs   Section Header. No data exists in this row.     INR   0.86 - 1.14 2.68                Most Recent 3 Troponin's:  Recent Labs   Lab Test 06/14/21  1005 06/13/20  2307 10/26/19  0649   TROPI <0.015 <0.015 <0.015     Most Recent Cholesterol Panel:  Recent Labs   Lab Test 09/23/20  0000   CHOL 104   LDL 43   HDL 51   TRIG 48     Most Recent 6 Bacteria Isolates From Any Culture (See EPIC Reports for Culture Details):  Recent Labs   Lab Test 06/15/20  1705 06/14/20  0159 06/13/20  2306   CULT Light growth  Normal zan   No growth No growth     Most Recent TSH, T4 and A1c Labs:No lab results found.  Results for orders placed or performed during the hospital encounter of 06/08/23   Chest XR,  PA &  LAT    Narrative    EXAM: XR CHEST 2 VIEWS  LOCATION: [facility]  DATE/TIME: 2023 8:35 PM CDT    INDICATION: Cough. Pneumonia.  COMPARISON: 2023.      Impression    IMPRESSION: Slight worsening of right basilar opacities since previous day. No other significant change. Cannot exclude minimal pleural fluid.     Echocardiogram Complete     Value    LVEF  55%    Narrative    [id number]  [id number]  [id number]  [id number]^NAZ^RAHUL     [facility]  Echocardiography Laboratory  [address] [place] [address]     Name: AURELIANO KAUFMAN  MRN: [id number]  : 1935  Study Date: 06/10/2023 02:12 PM  Age: 88 yrs  Gender: Male  Patient Location: [facility]  Reason For Study: SOB  Ordering Physician: NAZ CANTU  Referring Physician: Abraham Velez  Performed By: Angus Torres RDCS     BSA: 1.9 m2  Height: 73 in  Weight: 141 lb  HR: 101  BP: 130/78 mmHg  ______________________________________________________________________________  Procedure  Complete Portable Echo Adult. Optison (NDC #7651-1613) given intravenously.  ______________________________________________________________________________  Interpretation Summary     1. The left ventricle is normal in size. The visual ejection fraction is  estimated at 55%. Basal inferior hypokinesis.  2. The right ventricle is normal in structure, function and size.  3. No valve disease.     Outside echo from 2019 reported EF 50%, no valve disease.     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  ______________________________________________________________________________  Left Ventricle  The left ventricle is normal in size. There is normal left ventricular wall  thickness. The visual ejection fraction is estimated at 55%. Left ventricular  diastolic function is indeterminate. Basal inferior hypokinesis.     Right Ventricle  The right ventricle is  normal in structure, function and size.     Atria  Normal left atrial size. Right atrial size is normal. There is no atrial shunt  seen.     Mitral Valve  The mitral valve is normal in structure and function.     Tricuspid Valve  No tricuspid regurgitation.     Aortic Valve  The aortic valve is normal in structure and function.     Pulmonic Valve  The pulmonic valve is normal in structure and function.     Vessels  Normal IVC size with reduced respiratory collapse.     Pericardium  There is no pericardial effusion.     Rhythm  The rhythm was atrial fibrillation.  ______________________________________________________________________________  MMode/2D Measurements & Calculations  IVSd: 0.77 cm     LVIDd: 5.4 cm  LVIDs: 4.3 cm  LVPWd: 0.60 cm  IVC diam: 1.7 cm  FS: 20.2 %  LV mass(C)d: 129.0 grams  LV mass(C)dI: 69.5 grams/m2  Ao root diam: 3.6 cm  asc Aorta Diam: 3.5 cm  LVOT diam: 2.4 cm  LVOT area: 4.5 cm2  LA Volume (BP): 54.3 ml  LA Volume Index (BP): 29.4 ml/m2  RWT: 0.22  TAPSE: 1.6 cm     Doppler Measurements & Calculations  MV E max andriy: 71.7 cm/sec  PA acc time: 0.09 sec  E/E' av.6  Lateral E/e': 5.6  Medial E/e': 7.6  RV S Andriy: 9.0 cm/sec     ______________________________________________________________________________  Report approved by: Alexis Glasgow 06/10/2023 03:05 PM           Disclaimer: This note consists of symbols derived from keyboarding, dictation and/or voice recognition software. As a result, there may be errors in the script that have gone undetected. Please consider this when interpreting information found in this chart.

## 2023-06-16 NOTE — LETTER
6/16/2023        RE: William Ba  [address]  [facility] [address]        [place] [facility]  PRIMARY CARE PROVIDER AND CLINIC:  EYAD Diaz [facility], [address] [facility] / [facility] [address]  Chief Complaint   Patient presents with     Hospital F/U     [facility] Record Number:  [id number]  Place of Service where encounter took place:  [facility] (care home) [233]    William Ba  is a 88 year old  (1935), re-admitted to the above facility from  [facility]. Hospital stay 6/8/23 - 6/14/23. .  Admitted to this facility for  nursing care and hospice.    HPI:    HPI information obtained from: facility chart records, facility staff, [facility] chart review and family/first contact dtr report.   Brief Summary of Hospital Course:     Cough: hosp. 6/8/23. Had recent asp pneumonia. Had infectious encephalopathy. tx with IV anbyx. Started on hospice cares.    Pulmonary HTN; dx during last hosp. Stay. tx with IV lasix. Has ongoing congestion. Cont. On sched, prn MS. Some difficulty managing secretions. Increase in levsin dose.    Dementia. Cog decline. Started on seroquel during hosp. Stay. Not felt to have UTI. With hospice started on haldol, ativan. Some terminal agitation at times.       Updates on Status Since Skilled nursing Admission: decreased responsiveness over past couple days.     CODE STATUS/ADVANCE DIRECTIVES DISCUSSION:   DNR / DNI  Patient's living condition: lives in an assisted living facility  ALLERGIES: Bee, Influenza virus vaccine, and Methylprednisolone  PAST MEDICAL HISTORY:  has a past medical history of Abdominal aortic aneurysm without rupture (H) (02/25/2016), Atrial fibrillation/flutter, Atrial fibrillation/flutter (11/13/2019), COPD (chronic obstructive pulmonary disease), Coronary atherosclerosis (01/29/2015), and Mixed hyperlipidemia (11/08/2010).  PAST SURGICAL HISTORY:   has a past surgical history that  includes Replacement Total Knee; hernia repair; and colonoscopy.  FAMILY HISTORY: family history includes Abdominal Aortic Aneurysm in his father; Aneurysm in his father; Colon Cancer in his brother; Heart Disease in his mother.  SOCIAL HISTORY:   reports that he quit smoking about 21 years ago. His smoking use included cigarettes. He smoked an average of 1 pack per day. He has never used smokeless tobacco. He reports current alcohol use.    Post Discharge Medication Reconciliation Status: discharge medications reconciled and changed, per note/orders    Current Outpatient Medications   Medication Sig Dispense Refill     haloperidol (HALDOL) 1 MG tablet Take 1 mg by mouth 3 times daily And q 4 hr prn       hyoscyamine (LEVSIN/SL) 0.125 MG sublingual tablet Place 0.125 mg under the tongue every 4 hours And q 1 hr prn       LORazepam (ATIVAN) 1 MG tablet Take 1 mg by mouth every 4 hours And q 1 hr prn       morphine 5 MG solu-tab Take 10 mg by mouth every 4 hours And q 1 hr prn       metoprolol tartrate (LOPRESSOR) 25 MG tablet Take 1 tablet (25 mg) by mouth 2 times daily for 30 days 60 tablet 0     miconazole with skin protectant (NETTE ANTIFUNGAL) 2 % CREA cream Apply topically 2 times daily And TID prn           ROS:  Unobtainable secondary to cognitive impairment.     Vitals:  Pulse 90   Resp 24   Exam:  GENERAL APPEARANCE:  thin, no responsive to verbal stim. in NAD  ENT:  no rhinorrhea. dry oral mucous membranes  NECK:  no carotid bruit  RESP:  diminished breath sounds throughout. occ coarse breath sound over bronchiol area. accessory muscle use  CV:  Palpation and auscultation of heart done , no edema, rate-normal  ABDOMEN:  no active bowel sounds, NT  M/S:   no apparent muscle contracture. not moving extremities during exam  SKIN:  Inspection of skin and subcutaneous tissue baseline  NEURO:   no responsive to verbal stim. occ response to physical stim. does not open eye  PSYCH:  no apparent anxiety or  restlessness. no current apparent agitation    Lab/Diagnostic data:    Most Recent 3 CBC's:Recent Labs   Lab Test 06/12/23  0716 06/11/23  0756 06/10/23  0740   WBC 8.9 9.2 8.2   HGB 12.6* 13.1* 11.9*   MCV 95 95 95    186 173     Most Recent 3 BMP's:Recent Labs   Lab Test 06/12/23  0716 06/11/23  0756 06/10/23  0740    144 142   POTASSIUM 3.5 3.7 3.8   CHLORIDE 106 106 107   CO2 25 25 26   BUN 15.1 17.3 19.5   CR 0.80 0.76 0.86   ANIONGAP 11 13 9   NIEVES 8.5* 8.7* 8.6*   * 90 90       ASSESSMENT/PLAN:  (R05.9) Cough, unspecified type  (primary encounter diagnosis)  Comment: occ coarse breath sounds. Some accessory muscle use with breathing  Plan: 1. Keep HOB elevated  2. Cont. Sched, prn MS  3. Use prn MS for any s/s resp. distress    (I27.20) Pulmonary hypertension (H)  Comment: s/p IV diuresis in hosp. No current po intake  Plan: 1. Cont. Sched, prn levsin  2. Cont. duonebs for now  3. MS as above  4. Cont. 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 In NAD today. Decrease in responsiveness   Plan: 1. Cont. Haldol, ativan  2. For agitation, restlessness use prn ativan, haldol  3. Reposition freq. For comfort        Electronically signed by:  EYAD Diaz CNP                         Sincerely,        EYAD Diaz CNP

## 2023-06-16 NOTE — PROGRESS NOTES
[place] [facility]  PRIMARY CARE PROVIDER AND CLINIC:  Abraham Velez, EYAD CNP, [address] [facility] [address]  Chief Complaint   Patient presents with     Hospital F/U     [facility] Record Number:  [id number]  Place of Service where encounter took place:  [facility] ([facility]) [233]    William Ba  is a 88 year old  (1935), re-admitted to the above facility from  [facility]. Hospital stay 6/8/23 - 6/14/23. .  Admitted to this facility for  nursing care and hospice.    HPI:    HPI information obtained from: facility chart records, facility staff, [facility] chart review and family/first contact dtr report.   Brief Summary of Hospital Course:     Cough: hosp. 6/8/23. Had recent asp pneumonia. Had infectious encephalopathy. tx with IV anbyx. Started on hospice cares.    Pulmonary HTN; dx during last hosp. Stay. tx with IV lasix. Has ongoing congestion. Cont. On sched, prn MS. Some difficulty managing secretions. Increase in levsin dose.    Dementia. Cog decline. Started on seroquel during hosp. Stay. Not felt to have UTI. With hospice started on haldol, ativan. Some terminal agitation at times.       Updates on Status Since Skilled nursing Admission: decreased responsiveness over past couple days.     CODE STATUS/ADVANCE DIRECTIVES DISCUSSION:   DNR / DNI  Patient's living condition: lives in an assisted living facility  ALLERGIES: Bee, Influenza virus vaccine, and Methylprednisolone  PAST MEDICAL HISTORY:  has a past medical history of Abdominal aortic aneurysm without rupture (H) (02/25/2016), Atrial fibrillation/flutter, Atrial fibrillation/flutter (11/13/2019), COPD (chronic obstructive pulmonary disease), Coronary atherosclerosis (01/29/2015), and Mixed hyperlipidemia (11/08/2010).  PAST SURGICAL HISTORY:   has a past surgical history that includes Replacement Total Knee; hernia repair; and colonoscopy.  FAMILY HISTORY: family  history includes Abdominal Aortic Aneurysm in his father; Aneurysm in his father; Colon Cancer in his brother; Heart Disease in his mother.  SOCIAL HISTORY:   reports that he quit smoking about 21 years ago. His smoking use included cigarettes. He smoked an average of 1 pack per day. He has never used smokeless tobacco. He reports current alcohol use.    Post Discharge Medication Reconciliation Status: discharge medications reconciled and changed, per note/orders    Current Outpatient Medications   Medication Sig Dispense Refill     haloperidol (HALDOL) 1 MG tablet Take 1 mg by mouth 3 times daily And q 4 hr prn       hyoscyamine (LEVSIN/SL) 0.125 MG sublingual tablet Place 0.125 mg under the tongue every 4 hours And q 1 hr prn       LORazepam (ATIVAN) 1 MG tablet Take 1 mg by mouth every 4 hours And q 1 hr prn       morphine 5 MG solu-tab Take 10 mg by mouth every 4 hours And q 1 hr prn       metoprolol tartrate (LOPRESSOR) 25 MG tablet Take 1 tablet (25 mg) by mouth 2 times daily for 30 days 60 tablet 0     miconazole with skin protectant (NETTE ANTIFUNGAL) 2 % CREA cream Apply topically 2 times daily And TID prn           ROS:  Unobtainable secondary to cognitive impairment.     Vitals:  Pulse 90   Resp 24   Exam:  GENERAL APPEARANCE:  thin, no responsive to verbal stim. in NAD  ENT:  no rhinorrhea. dry oral mucous membranes  NECK:  no carotid bruit  RESP:  diminished breath sounds throughout. occ coarse breath sound over bronchiol area. accessory muscle use  CV:  Palpation and auscultation of heart done , no edema, rate-normal  ABDOMEN:  no active bowel sounds, NT  M/S:   no apparent muscle contracture. not moving extremities during exam  SKIN:  Inspection of skin and subcutaneous tissue baseline  NEURO:   no responsive to verbal stim. occ response to physical stim. does not open eye  PSYCH:  no apparent anxiety or restlessness. no current apparent agitation    Lab/Diagnostic data:    Most Recent 3 CBC's:Recent  Labs   Lab Test 06/12/23  0716 06/11/23  0756 06/10/23  0740   WBC 8.9 9.2 8.2   HGB 12.6* 13.1* 11.9*   MCV 95 95 95    186 173     Most Recent 3 BMP's:Recent Labs   Lab Test 06/12/23  0716 06/11/23  0756 06/10/23  0740    144 142   POTASSIUM 3.5 3.7 3.8   CHLORIDE 106 106 107   CO2 25 25 26   BUN 15.1 17.3 19.5   CR 0.80 0.76 0.86   ANIONGAP 11 13 9   NIEVES 8.5* 8.7* 8.6*   * 90 90       ASSESSMENT/PLAN:  (R05.9) Cough, unspecified type  (primary encounter diagnosis)  Comment: occ coarse breath sounds. Some accessory muscle use with breathing  Plan: 1. Keep HOB elevated  2. Cont. Sched, prn MS  3. Use prn MS for any s/s resp. distress    (I27.20) Pulmonary hypertension (H)  Comment: s/p IV diuresis in hosp. No current po intake  Plan: 1. Cont. Sched, prn levsin  2. Cont. duonebs for now  3. MS as above  4. Cont. 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 In NAD today. Decrease in responsiveness   Plan: 1. Cont. Haldol, ativan  2. For agitation, restlessness use prn ativan, haldol  3. Reposition freq. For comfort        Electronically signed by:  EYAD Diaz CNP

## 2023-06-19 ENCOUNTER — TELEPHONE (OUTPATIENT)
Dept: GERIATRICS | Facility: CLINIC | Age: 88
End: 2023-06-19
Payer: MEDICARE

## 2023-06-19 NOTE — TELEPHONE ENCOUNTER
Geriatrics Notification of Patient Death    Provider: EYAD Montenegro CNP   Place of death: [facility]  Facility Type:  [place]    Caller: [place]   Date of Death: 6/16/23    Patient was on Hospice care: Yes; please explain: [facility]   Patient was seen by [facility] Geriatrics provider: Yes; please explain: 6/16/23 by Abraham Montoya RN

## 2023-08-29 NOTE — PROGRESS NOTES
How Severe Are Your Spot(S)?: moderate [facility]    Hospitalist Progress Note  Name: William Ba    MRN: [id number]  Provider: Little William MD  Date of Service: 06/10/2023    Assessment & Plan   Summary of Stay: William Ba is a 88 year old male who was admitted on 6/8/2023 for increased agitation in setting of new diagnosis of aspiration pneumonia.  Patient's past medical history significant for coronary artery disease, atrial fibrillation, prostate cancer, COPD, dementia, hyperlipidemia lives in a memory care group home.  He was recently discharged from emergency room for aspiration pneumonia on Augmentin.  But returned with increased agitation.    6/10/2023.  Patient is increased work of breathing improved after IV diuresis and overnight required BiPAP which helped his breathing.  Currently on 2 L satting well and breathing comfortably        Recent agitation in setting of known dementia  Acute infectious encephalopathy  -In the emergency room patient appears to be, started on Seroquel 12.5 mg every 6 hours as needed  -Supportive care  -Redirection  -I will check urine analysis    Acute Resp failure with hypoxia  Recent diagnosis of aspiration pneumonia  -A day prior to most recent hospitalization patient was admitted for acute respiratory failure.  Oxygen needs were down to baseline was discharged home on room air oxygenation  -Speech therapist was consulted recommended regular diet with thin liquids but reduce distraction while eating  -Patient received a dose of ceftriaxone and azithromycin in emergency room.  Was switched to Unasyn given aspiration pneumonia  -Continue on nebs and Mucinex  - requiring increased o2, tachypneic  - on exam has crackles   - iv lasixs 40mg X1 6/10/2023  - Iv ativan for anxiety  - discontinue fluids  - daily weight  - d/w pt's daughter who is a nurse  -Patient responded well to IV Lasix  -We will also check cardiac echo    Atrial fibrillation and flutter  -Continue metoprolol  -Continue  Have Your Spot(S) Been Treated In The Past?: has not been treated Xarelto    Coronary artery disease  -On metoprolol, atorvastatin    Known history of dementia  -Lives in memory care  -Continue Aricept  -Family wanted to know more about palliative care and hospice care.  -Consult palliative to discuss regarding hospice care      DVT Prophylaxis: Pneumatic Compression Devices  Code Status: No CPR- Do NOT Intubate    Disposition: Expected discharge at least 2 days pending improvement      Interval History   Reviewed chart. Pt is sleepy and has underlying dementia at baseline cannot complete 10 point ROS. Daughter at bedside.    -Data reviewed today: I reviewed all new labs and imaging reports over the last 24 hours. I personally reviewed no images or EKG's today.    Physical Exam   Temp: 97.3  F (36.3  C) Temp src: Axillary BP: 127/75 Pulse: 100   Resp: 18 SpO2: 100 % O2 Device: Nasal cannula Oxygen Delivery: 2 LPM  Vitals:    06/10/23 0611   Weight: 72.2 kg (159 lb 2.8 oz)     Vital Signs with Ranges  Temp:  [97.3  F (36.3  C)-99.1  F (37.3  C)] 97.3  F (36.3  C)  Pulse:  [] 100  Resp:  [18-36] 18  BP: (113-157)/(73-92) 127/75  FiO2 (%):  [20 %-30 %] 30 %  SpO2:  [91 %-100 %] 100 %  I/O last 3 completed shifts:  In: -   Out: 2450 [Urine:2450]      GEN: Sitting breathing comfortably.  Cannot assess for orientation   HEENT:  Normocephalic/atraumatic, no scleral icterus, no nasal discharge, mouth dry.  CV :tachycardic no murmur or JVD.  S1 + S2 noted, no S3 or S4.  LUNGS:  Bibasilar crackles,  Symmetric chest rise on inhalation noted.  ABD:  Active bowel sounds, soft, non-tender/non-distended.  No rebound/guarding/rigidity.  EXT:  No edema.  No cyanosis.    SKIN:  Dry to touch, no exanthems noted in the visualized areas.    Medications     - MEDICATION INSTRUCTIONS -       - MEDICATION INSTRUCTIONS -         ampicillin-sulbactam  3 g Intravenous Q6H     atorvastatin  40 mg Oral At Bedtime     diclofenac  2 g Topical BID     donepezil  5 mg Oral QPM     famotidine  10 mg Oral QAM      guaiFENesin  200 mg Oral Q4H     ipratropium - albuterol 0.5 mg/2.5 mg/3 mL  3 mL Nebulization 4x daily     metoprolol tartrate  25 mg Oral BID     rivaroxaban ANTICOAGULANT  20 mg Oral Daily with supper     senna-docusate  1 tablet Oral BID    Or     senna-docusate  2 tablet Oral BID     sodium chloride (PF)  3 mL Intracatheter Q8H     Data     Recent Labs   Lab 06/10/23  0740 06/08/23 1924 06/08/23  0745   WBC 8.2 11.3* 8.1   HGB 11.9* 11.6* 12.5*   HCT 37.1* 36.4* 38.6*   MCV 95 96 95    153 152     Recent Labs   Lab 06/10/23  0740 06/08/23 1924 06/08/23  0745    143 139   POTASSIUM 3.8 4.8 4.3   CHLORIDE 107 108* 104   CO2 26 23 21*   ANIONGAP 9 12 14   GLC 90 144* 121*   BUN 19.5 22.4 16.0   CR 0.86 0.80 0.72   GFRESTIMATED 84 86 88   NIEVES 8.6* 9.0 8.5*     7-Day Micro Results     Collected Updated Procedure Result Status      06/08/2023 2011 06/09/2023 2346 Blood Culture Peripheral Blood [[id number]]   Peripheral Blood    Preliminary result Component Value   Culture No growth after 1 day  [P]                06/08/2023 2011 06/09/2023 2346 Blood Culture Line, venous [[id number]]   Blood from Line, venous    Preliminary result Component Value   Culture No growth after 1 day  [P]                06/07/2023 1858 06/07/2023 1945 Symptomatic Influenza A/B, RSV, & SARS-CoV2 PCR (COVID-19) Nasopharyngeal [[id number]]    Swab from Nasopharyngeal    Final result Component Value   Influenza A PCR Negative   Influenza B PCR Negative   RSV PCR Negative   SARS CoV2 PCR Negative   NEGATIVE: SARS-CoV-2 (COVID-19) RNA not detected, presumed negative.                Recent Labs   Lab 06/07/23  1847   AST 32   ALT 29   ALKPHOS 83   BILITOTAL 1.0     No results for input(s): INR in the last 168 hours.  Recent Labs   Lab 06/10/23  0740 06/08/23 2011 06/08/23  1933   LACT 1.0 1.9 3.5*     No results for input(s): LIPASE in the last 168 hours.  No results for input(s): TROPONIN, TROPI, TROPR, TROPONINIS in  Hpi Title: Evaluation of Skin Lesions Additional History: Declined removal of shorts during the skin exam. the last 168 hours.    Invalid input(s): TROPT, TROP, TROPONINIES, TNIH  Recent Labs   Lab 06/09/23  1150   COLOR Orange*   APPEARANCE Slightly Cloudy*   URINEGLC Negative   URINEBILI Negative   URINEKETONE Negative   SG 1.030   UBLD Large*   URINEPH 5.5   PROTEIN 50*   NITRITE Negative   LEUKEST Negative   RBCU >182*   WBCU 1       No results found for this or any previous visit (from the past 24 hour(s)).

## 2024-06-17 PROBLEM — Z76.89 HEALTH CARE HOME: Status: RESOLVED | Noted: 2020-07-01 | Resolved: 2024-06-17
